# Patient Record
Sex: MALE | Race: WHITE | Employment: UNEMPLOYED | URBAN - METROPOLITAN AREA
[De-identification: names, ages, dates, MRNs, and addresses within clinical notes are randomized per-mention and may not be internally consistent; named-entity substitution may affect disease eponyms.]

---

## 2017-11-21 ENCOUNTER — HOSPITAL ENCOUNTER (INPATIENT)
Age: 82
LOS: 4 days | Discharge: NURSING, REHAB OR LONG-TERM CARE | DRG: 554 | End: 2017-11-28
Attending: EMERGENCY MEDICINE | Admitting: INTERNAL MEDICINE
Payer: MEDICARE

## 2017-11-21 ENCOUNTER — APPOINTMENT (OUTPATIENT)
Dept: GENERAL RADIOLOGY | Age: 82
DRG: 554 | End: 2017-11-21
Attending: EMERGENCY MEDICINE
Payer: MEDICARE

## 2017-11-21 ENCOUNTER — APPOINTMENT (OUTPATIENT)
Dept: ULTRASOUND IMAGING | Age: 82
DRG: 554 | End: 2017-11-21
Attending: EMERGENCY MEDICINE
Payer: MEDICARE

## 2017-11-21 DIAGNOSIS — M25.562 ACUTE PAIN OF LEFT KNEE: ICD-10-CM

## 2017-11-21 DIAGNOSIS — M25.462 KNEE EFFUSION, LEFT: Primary | ICD-10-CM

## 2017-11-21 DIAGNOSIS — F03.90 DEMENTIA WITHOUT BEHAVIORAL DISTURBANCE, UNSPECIFIED DEMENTIA TYPE: ICD-10-CM

## 2017-11-21 LAB
ALBUMIN SERPL-MCNC: 3.1 G/DL (ref 3.5–5)
ALBUMIN/GLOB SERPL: 0.7 {RATIO} (ref 1.1–2.2)
ALP SERPL-CCNC: 78 U/L (ref 45–117)
ALT SERPL-CCNC: 15 U/L (ref 12–78)
ANION GAP SERPL CALC-SCNC: 10 MMOL/L (ref 5–15)
AST SERPL-CCNC: 16 U/L (ref 15–37)
BASOPHILS # BLD: 0 K/UL (ref 0–0.1)
BASOPHILS NFR BLD: 0 % (ref 0–1)
BILIRUB SERPL-MCNC: 0.8 MG/DL (ref 0.2–1)
BUN SERPL-MCNC: 25 MG/DL (ref 6–20)
BUN/CREAT SERPL: 15 (ref 12–20)
CALCIUM SERPL-MCNC: 8.4 MG/DL (ref 8.5–10.1)
CHLORIDE SERPL-SCNC: 100 MMOL/L (ref 97–108)
CO2 SERPL-SCNC: 22 MMOL/L (ref 21–32)
CREAT SERPL-MCNC: 1.71 MG/DL (ref 0.7–1.3)
EOSINOPHIL # BLD: 0 K/UL (ref 0–0.4)
EOSINOPHIL NFR BLD: 0 % (ref 0–7)
ERYTHROCYTE [DISTWIDTH] IN BLOOD BY AUTOMATED COUNT: 13.4 % (ref 11.5–14.5)
GLOBULIN SER CALC-MCNC: 4.3 G/DL (ref 2–4)
GLUCOSE SERPL-MCNC: 107 MG/DL (ref 65–100)
HCT VFR BLD AUTO: 34.5 % (ref 36.6–50.3)
HGB BLD-MCNC: 12.2 G/DL (ref 12.1–17)
LYMPHOCYTES # BLD: 0.8 K/UL (ref 0.8–3.5)
LYMPHOCYTES NFR BLD: 7 % (ref 12–49)
MCH RBC QN AUTO: 33.8 PG (ref 26–34)
MCHC RBC AUTO-ENTMCNC: 35.4 G/DL (ref 30–36.5)
MCV RBC AUTO: 95.6 FL (ref 80–99)
MONOCYTES # BLD: 1.9 K/UL (ref 0–1)
MONOCYTES NFR BLD: 16 % (ref 5–13)
NEUTS SEG # BLD: 9.2 K/UL (ref 1.8–8)
NEUTS SEG NFR BLD: 77 % (ref 32–75)
PLATELET # BLD AUTO: 438 K/UL (ref 150–400)
POTASSIUM SERPL-SCNC: 3.8 MMOL/L (ref 3.5–5.1)
PROT SERPL-MCNC: 7.4 G/DL (ref 6.4–8.2)
RBC # BLD AUTO: 3.61 M/UL (ref 4.1–5.7)
SODIUM SERPL-SCNC: 132 MMOL/L (ref 136–145)
URATE SERPL-MCNC: 7.1 MG/DL (ref 3.5–7.2)
WBC # BLD AUTO: 12 K/UL (ref 4.1–11.1)

## 2017-11-21 PROCEDURE — 96372 THER/PROPH/DIAG INJ SC/IM: CPT

## 2017-11-21 PROCEDURE — 96374 THER/PROPH/DIAG INJ IV PUSH: CPT

## 2017-11-21 PROCEDURE — 80053 COMPREHEN METABOLIC PANEL: CPT | Performed by: EMERGENCY MEDICINE

## 2017-11-21 PROCEDURE — 87205 SMEAR GRAM STAIN: CPT | Performed by: EMERGENCY MEDICINE

## 2017-11-21 PROCEDURE — 93971 EXTREMITY STUDY: CPT

## 2017-11-21 PROCEDURE — 84550 ASSAY OF BLOOD/URIC ACID: CPT | Performed by: EMERGENCY MEDICINE

## 2017-11-21 PROCEDURE — 0S9D3ZX DRAINAGE OF LEFT KNEE JOINT, PERCUTANEOUS APPROACH, DIAGNOSTIC: ICD-10-PCS | Performed by: PHYSICIAN ASSISTANT

## 2017-11-21 PROCEDURE — 74011250637 HC RX REV CODE- 250/637: Performed by: EMERGENCY MEDICINE

## 2017-11-21 PROCEDURE — 3E0U33Z INTRODUCTION OF ANTI-INFLAMMATORY INTO JOINTS, PERCUTANEOUS APPROACH: ICD-10-PCS | Performed by: PHYSICIAN ASSISTANT

## 2017-11-21 PROCEDURE — 85025 COMPLETE CBC W/AUTO DIFF WBC: CPT | Performed by: EMERGENCY MEDICINE

## 2017-11-21 PROCEDURE — 99285 EMERGENCY DEPT VISIT HI MDM: CPT

## 2017-11-21 PROCEDURE — 74011000250 HC RX REV CODE- 250: Performed by: EMERGENCY MEDICINE

## 2017-11-21 PROCEDURE — 3E0U3BZ INTRODUCTION OF ANESTHETIC AGENT INTO JOINTS, PERCUTANEOUS APPROACH: ICD-10-PCS | Performed by: PHYSICIAN ASSISTANT

## 2017-11-21 PROCEDURE — A9270 NON-COVERED ITEM OR SERVICE: HCPCS | Performed by: EMERGENCY MEDICINE

## 2017-11-21 PROCEDURE — 74011636637 HC RX REV CODE- 636/637: Performed by: EMERGENCY MEDICINE

## 2017-11-21 PROCEDURE — 74011250636 HC RX REV CODE- 250/636: Performed by: INTERNAL MEDICINE

## 2017-11-21 PROCEDURE — 87077 CULTURE AEROBIC IDENTIFY: CPT | Performed by: EMERGENCY MEDICINE

## 2017-11-21 PROCEDURE — 36415 COLL VENOUS BLD VENIPUNCTURE: CPT | Performed by: EMERGENCY MEDICINE

## 2017-11-21 PROCEDURE — 75810000054 HC ARTHOCENTISIS JOINT

## 2017-11-21 PROCEDURE — 89060 EXAM SYNOVIAL FLUID CRYSTALS: CPT | Performed by: EMERGENCY MEDICINE

## 2017-11-21 PROCEDURE — 73562 X-RAY EXAM OF KNEE 3: CPT

## 2017-11-21 PROCEDURE — 87186 SC STD MICRODIL/AGAR DIL: CPT | Performed by: EMERGENCY MEDICINE

## 2017-11-21 PROCEDURE — 89050 BODY FLUID CELL COUNT: CPT | Performed by: EMERGENCY MEDICINE

## 2017-11-21 RX ORDER — HYDROCODONE BITARTRATE AND ACETAMINOPHEN 5; 325 MG/1; MG/1
1 TABLET ORAL
Status: COMPLETED | OUTPATIENT
Start: 2017-11-21 | End: 2017-11-21

## 2017-11-21 RX ORDER — PREDNISONE 20 MG/1
60 TABLET ORAL
Status: COMPLETED | OUTPATIENT
Start: 2017-11-21 | End: 2017-11-21

## 2017-11-21 RX ADMIN — LIDOCAINE HYDROCHLORIDE 45 MG: 10; .005 INJECTION, SOLUTION EPIDURAL; INFILTRATION; INTRACAUDAL; PERINEURAL at 19:48

## 2017-11-21 RX ADMIN — PREDNISONE 60 MG: 20 TABLET ORAL at 23:05

## 2017-11-21 RX ADMIN — METHYLPREDNISOLONE SODIUM SUCCINATE 125 MG: 125 INJECTION, POWDER, FOR SOLUTION INTRAMUSCULAR; INTRAVENOUS at 23:19

## 2017-11-21 RX ADMIN — HYDROCODONE BITARTRATE AND ACETAMINOPHEN 1 TABLET: 5; 325 TABLET ORAL at 19:38

## 2017-11-21 NOTE — IP AVS SNAPSHOT
[address] [facility] 
[phone] Patient: Luisana Moreirar. MRN: [id number] FUW:[id number] My Medications STOP taking these medications   
 colchicine 0.6 mg tablet TAKE these medications as instructed Instructions Each Dose to Equal  
 Morning Noon Evening Bedtime  
 allopurinol 100 mg tablet Commonly known as:  Martin Myers Start taking on:  11/29/2017 Your last dose was: Your next dose is: Take 1 Tab by mouth daily. 100 mg  
    
   
   
   
  
 amLODIPine 5 mg tablet Commonly known as:  Hema Lee Start taking on:  11/29/2017 Your last dose was: Your next dose is: Take 1 Tab by mouth daily. 5 mg  
    
   
   
   
  
 atropine-PHENobarbital-scopolamine-hyoscyamine 16.2-0.1037 -0.0194 mg per tablet Commonly known as:  DONNATAL Your last dose was: Your next dose is: Take 1 Tab by mouth every six (6) hours as needed for Pain. Max Daily Amount: 4 Tabs. 1 Tab CARDURA 4 mg tablet Generic drug:  doxazosin Your last dose was: Your next dose is: Take 4 mg by mouth nightly. Indications: HYPERTENSION  
 4 mg  
    
   
   
   
  
 famotidine 20 mg tablet Commonly known as:  PEPCID Your last dose was: Your next dose is: Take 1 Tab by mouth two (2) times a day. 20 mg HYDROcodone-acetaminophen 5-325 mg per tablet Commonly known as:  Lidia Sherwoodthergill Your last dose was: Your next dose is: Take 2 tablets by mouth every four (4) hours as needed. 2 Tab LASIX 20 mg tablet Generic drug:  furosemide Your last dose was: Your next dose is: Take 20 mg by mouth every other day. Indications: EDEMA 20 mg  
    
   
   
   
  
 memantine 10 mg tablet Commonly known as:  Gutierrez Anchors Start taking on:  12/10/2017 Your last dose was: Your next dose is: Take 1 Tab by mouth daily. 5 mg daily for 1 week, 5 mg p.o. twice daily for 1 week, 10 mg in a.m. and 5 mg at night for 1 week and then 10 mg p.o. twice daily 10 mg  
    
   
   
   
  
 metoprolol succinate 50 mg XL tablet Commonly known as:  TOPROL-XL Your last dose was: Your next dose is: Take 1 Tab by mouth daily. 50 mg  
    
   
   
   
  
 QUEtiapine 25 mg tablet Commonly known as:  SEROquel Your last dose was: Your next dose is: Take 0.5 Tabs by mouth nightly. 12.5 mg  
    
   
   
   
  
 tamsulosin 0.4 mg capsule Commonly known as:  FLOMAX Your last dose was: Your next dose is: Take 1 capsule by mouth daily. 0.4 mg Where to Get Your Medications Information on where to get these meds will be given to you by the nurse or doctor. ! Ask your nurse or doctor about these medications  
  allopurinol 100 mg tablet  
 amLODIPine 5 mg tablet  
 memantine 10 mg tablet QUEtiapine 25 mg tablet

## 2017-11-21 NOTE — IP AVS SNAPSHOT
[address] [facility] 
[phone] Patient: Nay Terrazas MRN: [id number]:[id number] About your hospitalization You were admitted on:  November 22, 2017 You last received care in the:  [facility] 2 GENERAL SURGERY You were discharged on:  November 28, 2017 Why you were hospitalized Your primary diagnosis was:  Not on File Your diagnoses also included:  Inability To Walk Things You Need To Do (next 8 weeks) Follow up with Radha Flores MD  
  
Phone:  [phone] Where:  [address], [address] [facility], [address] [facility] Discharge Orders None A check yahaira indicates which time of day the medication should be taken. My Medications STOP taking these medications   
 colchicine 0.6 mg tablet TAKE these medications as instructed Instructions Each Dose to Equal  
 Morning Noon Evening Bedtime  
 allopurinol 100 mg tablet Commonly known as:  Ashley Hampton Start taking on:  11/29/2017 Your last dose was: Your next dose is: Take 1 Tab by mouth daily. 100 mg  
    
   
   
   
  
 amLODIPine 5 mg tablet Commonly known as:  Bernard Presser Start taking on:  11/29/2017 Your last dose was: Your next dose is: Take 1 Tab by mouth daily. 5 mg  
    
   
   
   
  
 atropine-PHENobarbital-scopolamine-hyoscyamine 16.2-0.1037 -0.0194 mg per tablet Commonly known as:  DONNATAL Your last dose was: Your next dose is: Take 1 Tab by mouth every six (6) hours as needed for Pain. Max Daily Amount: 4 Tabs. 1 Tab CARDURA 4 mg tablet Generic drug:  doxazosin Your last dose was: Your next dose is: Take 4 mg by mouth nightly. Indications: HYPERTENSION  
 4 mg  
    
   
   
   
  
 famotidine 20 mg tablet Commonly known as:  PEPCID  
   
 Your last dose was: Your next dose is: Take 1 Tab by mouth two (2) times a day. 20 mg HYDROcodone-acetaminophen 5-325 mg per tablet Commonly known as:  Benetta Deisy Your last dose was: Your next dose is: Take 2 tablets by mouth every four (4) hours as needed. 2 Tab LASIX 20 mg tablet Generic drug:  furosemide Your last dose was: Your next dose is: Take 20 mg by mouth every other day. Indications: EDEMA 20 mg  
    
   
   
   
  
 memantine 10 mg tablet Commonly known as:  Samuel Thornton Start taking on:  12/10/2017 Your last dose was: Your next dose is: Take 1 Tab by mouth daily. 5 mg daily for 1 week, 5 mg p.o. twice daily for 1 week, 10 mg in a.m. and 5 mg at night for 1 week and then 10 mg p.o. twice daily 10 mg  
    
   
   
   
  
 metoprolol succinate 50 mg XL tablet Commonly known as:  TOPROL-XL Your last dose was: Your next dose is: Take 1 Tab by mouth daily. 50 mg  
    
   
   
   
  
 QUEtiapine 25 mg tablet Commonly known as:  SEROquel Your last dose was: Your next dose is: Take 0.5 Tabs by mouth nightly. 12.5 mg  
    
   
   
   
  
 tamsulosin 0.4 mg capsule Commonly known as:  FLOMAX Your last dose was: Your next dose is: Take 1 capsule by mouth daily. 0.4 mg Where to Get Your Medications Information on where to get these meds will be given to you by the nurse or doctor. ! Ask your nurse or doctor about these medications  
  allopurinol 100 mg tablet  
 amLODIPine 5 mg tablet  
 memantine 10 mg tablet QUEtiapine 25 mg tablet Discharge Instructions None [facility] Announcement  We are excited to announce that we are making your provider's discharge notes available to you in [employer]. You will see these notes when they are completed and signed by the physician that discharged you from your recent hospital stay. If you have any questions or concerns about any information you see in [employer], please call the Health Information Department where you were seen or reach out to your Primary Care Provider for more information about your plan of care. Introducing [facility]! [address] [facility] introduces [employer] patient portal. Now you can access parts of your medical record, email your doctor's office, and request medication refills online. 1. In your internet browser, go to [email or web address] 2. Click on the First Time User? Click Here link in the Sign In box. You will see the New Member Sign Up page. 3. Enter your [employer] Access Code exactly as it appears below. You will not need to use this code after youve completed the sign-up process. If you do not sign up before the expiration date, you must request a new code. · [employer] Access Code: [email or web address] Expires: 2/26/2018  9:28 AM 
 
4. Enter the last four digits of your Social Security Number (xxxx) and Date of Birth (mm/dd/yyyy) as indicated and click Submit. You will be taken to the next sign-up page. 5. Create a [employer] ID. This will be your [employer] login ID and cannot be changed, so think of one that is secure and easy to remember. 6. Create a [employer] password. You can change your password at any time. 7. Enter your Password Reset Question and Answer. This can be used at a later time if you forget your password. 8. Enter your e-mail address. You will receive e-mail notification when new information is available in [address] 9. Click Sign Up. You can now view and download portions of your medical record. 10. Click the Download Summary menu link to download a portable copy of your medical information. If you have questions, please visit the Frequently Asked Questions section of the MyChart website. Remember, [employer] is NOT to be used for urgent needs. For medical emergencies, dial 911. Now available from your iPhone and Android! Unresulted Labs-Please follow up with your PCP about these lab tests Order Current Status CULTURE, BODY FLUID W GRAM STAIN Preliminary result Providers Seen During Your Hospitalization Provider Specialty Primary office phone Julietteanni Castillothom. Tammie Mahoney MD Emergency Medicine [phone] Robel Walton MD Hospitalist [phone] Chari Cooper MD Hospitalist [phone] Cecilia Beverly MD Internal Medicine [phone] Immunizations Administered for This Admission Name Date Influenza Vaccine (Quad) PF 11/23/2017 Your Primary Care Physician (PCP) Primary Care Physician Office Phone Office Fax Shanelle Guillen [phone] You are allergic to the following No active allergies Recent Documentation Height Weight BMI Smoking Status 1.829 m 81.6 kg 24.41 kg/m2 Former Smoker Emergency Contacts Name Discharge Info Relation Home Work Mobile Ambar Dillon N/A  AT THIS TIME [6] Friend [5] [phone] Patient Belongings The following personal items are in your possession at time of discharge: 
  Dental Appliances: None  Visual Aid: Glasses      Home Medications: None   Jewelry: None  Clothing: At bedside    Other Valuables: None Please provide this summary of care documentation to your next provider. Signatures-by signing, you are acknowledging that this After Visit Summary has been reviewed with you and you have received a copy. Patient Signature:  ____________________________________________________________ Date:  ____________________________________________________________  
  
Florencia Kincaid  Provider Signature: ____________________________________________________________ Date:  ____________________________________________________________

## 2017-11-22 PROBLEM — R26.2 INABILITY TO WALK: Status: ACTIVE | Noted: 2017-11-22

## 2017-11-22 LAB
ANION GAP SERPL CALC-SCNC: 12 MMOL/L (ref 5–15)
APPEARANCE FLD: ABNORMAL
BODY FLD TYPE: NORMAL
BUN SERPL-MCNC: 25 MG/DL (ref 6–20)
BUN/CREAT SERPL: 17 (ref 12–20)
CALCIUM SERPL-MCNC: 8 MG/DL (ref 8.5–10.1)
CHLORIDE SERPL-SCNC: 103 MMOL/L (ref 97–108)
CO2 SERPL-SCNC: 18 MMOL/L (ref 21–32)
COLOR FLD: YELLOW
CREAT SERPL-MCNC: 1.44 MG/DL (ref 0.7–1.3)
CRYSTALS FLD MICRO: NORMAL
GLUCOSE SERPL-MCNC: 127 MG/DL (ref 65–100)
MONOS+MACROS NFR FLD: 10 %
NEUTROPHILS NFR FLD: 90 %
NUC CELL # FLD: ABNORMAL /CU MM (ref 0–5)
POTASSIUM SERPL-SCNC: 3.8 MMOL/L (ref 3.5–5.1)
RBC # FLD: >100 /CU MM
SODIUM SERPL-SCNC: 133 MMOL/L (ref 136–145)
SPECIMEN SOURCE FLD: ABNORMAL

## 2017-11-22 PROCEDURE — 74011000250 HC RX REV CODE- 250: Performed by: PHYSICIAN ASSISTANT

## 2017-11-22 PROCEDURE — 74011250636 HC RX REV CODE- 250/636: Performed by: INTERNAL MEDICINE

## 2017-11-22 PROCEDURE — 36415 COLL VENOUS BLD VENIPUNCTURE: CPT | Performed by: INTERNAL MEDICINE

## 2017-11-22 PROCEDURE — 97161 PT EVAL LOW COMPLEX 20 MIN: CPT | Performed by: PHYSICAL THERAPIST

## 2017-11-22 PROCEDURE — 97116 GAIT TRAINING THERAPY: CPT | Performed by: PHYSICAL THERAPIST

## 2017-11-22 PROCEDURE — 74011250636 HC RX REV CODE- 250/636: Performed by: PHYSICIAN ASSISTANT

## 2017-11-22 PROCEDURE — 74011250637 HC RX REV CODE- 250/637: Performed by: INTERNAL MEDICINE

## 2017-11-22 PROCEDURE — G8979 MOBILITY GOAL STATUS: HCPCS | Performed by: PHYSICAL THERAPIST

## 2017-11-22 PROCEDURE — G8978 MOBILITY CURRENT STATUS: HCPCS | Performed by: PHYSICAL THERAPIST

## 2017-11-22 PROCEDURE — 99218 HC RM OBSERVATION: CPT

## 2017-11-22 PROCEDURE — 80048 BASIC METABOLIC PNL TOTAL CA: CPT | Performed by: INTERNAL MEDICINE

## 2017-11-22 RX ORDER — METOPROLOL SUCCINATE 50 MG/1
50 TABLET, EXTENDED RELEASE ORAL DAILY
Status: DISCONTINUED | OUTPATIENT
Start: 2017-11-22 | End: 2017-11-28 | Stop reason: HOSPADM

## 2017-11-22 RX ORDER — SODIUM CHLORIDE 0.9 % (FLUSH) 0.9 %
5-10 SYRINGE (ML) INJECTION EVERY 8 HOURS
Status: DISCONTINUED | OUTPATIENT
Start: 2017-11-22 | End: 2017-11-23

## 2017-11-22 RX ORDER — HEPARIN SODIUM 5000 [USP'U]/ML
5000 INJECTION, SOLUTION INTRAVENOUS; SUBCUTANEOUS EVERY 12 HOURS
Status: DISCONTINUED | OUTPATIENT
Start: 2017-11-22 | End: 2017-11-28 | Stop reason: HOSPADM

## 2017-11-22 RX ORDER — SODIUM CHLORIDE 9 MG/ML
75 INJECTION, SOLUTION INTRAVENOUS CONTINUOUS
Status: DISCONTINUED | OUTPATIENT
Start: 2017-11-22 | End: 2017-11-22

## 2017-11-22 RX ORDER — SODIUM CHLORIDE 0.9 % (FLUSH) 0.9 %
5-10 SYRINGE (ML) INJECTION AS NEEDED
Status: DISCONTINUED | OUTPATIENT
Start: 2017-11-22 | End: 2017-11-23

## 2017-11-22 RX ORDER — DOXAZOSIN 2 MG/1
4 TABLET ORAL
Status: DISCONTINUED | OUTPATIENT
Start: 2017-11-22 | End: 2017-11-28 | Stop reason: HOSPADM

## 2017-11-22 RX ORDER — AMLODIPINE BESYLATE 5 MG/1
5 TABLET ORAL DAILY
Status: DISCONTINUED | OUTPATIENT
Start: 2017-11-22 | End: 2017-11-28 | Stop reason: HOSPADM

## 2017-11-22 RX ORDER — LIDOCAINE HYDROCHLORIDE 10 MG/ML
10 INJECTION INFILTRATION; PERINEURAL ONCE
Status: COMPLETED | OUTPATIENT
Start: 2017-11-22 | End: 2017-11-22

## 2017-11-22 RX ORDER — HALOPERIDOL 5 MG/ML
1 INJECTION INTRAMUSCULAR ONCE
Status: COMPLETED | OUTPATIENT
Start: 2017-11-22 | End: 2017-11-22

## 2017-11-22 RX ORDER — TAMSULOSIN HYDROCHLORIDE 0.4 MG/1
0.4 CAPSULE ORAL DAILY
Status: DISCONTINUED | OUTPATIENT
Start: 2017-11-22 | End: 2017-11-28 | Stop reason: HOSPADM

## 2017-11-22 RX ORDER — FAMOTIDINE 20 MG/1
20 TABLET, FILM COATED ORAL 2 TIMES DAILY
Status: DISCONTINUED | OUTPATIENT
Start: 2017-11-22 | End: 2017-11-24

## 2017-11-22 RX ORDER — ONDANSETRON 2 MG/ML
4 INJECTION INTRAMUSCULAR; INTRAVENOUS
Status: DISCONTINUED | OUTPATIENT
Start: 2017-11-22 | End: 2017-11-28 | Stop reason: HOSPADM

## 2017-11-22 RX ORDER — ACETAMINOPHEN 325 MG/1
650 TABLET ORAL
Status: DISCONTINUED | OUTPATIENT
Start: 2017-11-22 | End: 2017-11-28 | Stop reason: HOSPADM

## 2017-11-22 RX ORDER — METHYLPREDNISOLONE ACETATE 40 MG/ML
40 INJECTION, SUSPENSION INTRA-ARTICULAR; INTRALESIONAL; INTRAMUSCULAR; SOFT TISSUE ONCE
Status: COMPLETED | OUTPATIENT
Start: 2017-11-22 | End: 2017-11-22

## 2017-11-22 RX ORDER — FUROSEMIDE 20 MG/1
20 TABLET ORAL EVERY OTHER DAY
Status: DISCONTINUED | OUTPATIENT
Start: 2017-11-22 | End: 2017-11-28 | Stop reason: HOSPADM

## 2017-11-22 RX ADMIN — AMLODIPINE BESYLATE 5 MG: 5 TABLET ORAL at 12:54

## 2017-11-22 RX ADMIN — DOXAZOSIN 4 MG: 2 TABLET ORAL at 20:45

## 2017-11-22 RX ADMIN — METOPROLOL SUCCINATE 50 MG: 50 TABLET, EXTENDED RELEASE ORAL at 09:42

## 2017-11-22 RX ADMIN — HALOPERIDOL LACTATE 1 MG: 5 INJECTION, SOLUTION INTRAMUSCULAR at 20:46

## 2017-11-22 RX ADMIN — SODIUM CHLORIDE 75 ML/HR: 900 INJECTION, SOLUTION INTRAVENOUS at 05:31

## 2017-11-22 RX ADMIN — METHYLPREDNISOLONE ACETATE 40 MG: 40 INJECTION, SUSPENSION INTRA-ARTICULAR; INTRALESIONAL; INTRAMUSCULAR; SOFT TISSUE at 00:48

## 2017-11-22 RX ADMIN — HEPARIN SODIUM 5000 UNITS: 5000 INJECTION, SOLUTION INTRAVENOUS; SUBCUTANEOUS at 04:36

## 2017-11-22 RX ADMIN — Medication 10 ML: at 05:38

## 2017-11-22 RX ADMIN — TAMSULOSIN HYDROCHLORIDE 0.4 MG: 0.4 CAPSULE ORAL at 09:41

## 2017-11-22 RX ADMIN — FAMOTIDINE 20 MG: 20 TABLET ORAL at 18:00

## 2017-11-22 RX ADMIN — HEPARIN SODIUM 5000 UNITS: 5000 INJECTION, SOLUTION INTRAVENOUS; SUBCUTANEOUS at 15:37

## 2017-11-22 RX ADMIN — FUROSEMIDE 20 MG: 20 TABLET ORAL at 04:12

## 2017-11-22 RX ADMIN — FAMOTIDINE 20 MG: 20 TABLET ORAL at 09:41

## 2017-11-22 RX ADMIN — Medication 10 ML: at 14:00

## 2017-11-22 RX ADMIN — LIDOCAINE HYDROCHLORIDE 10 ML: 10 INJECTION, SOLUTION INFILTRATION; PERINEURAL at 00:48

## 2017-11-22 NOTE — PROGRESS NOTES
Bedside shift change report given to Agusto Bob RN (oncoming nurse) by Bert Nageotte, RN (offgoing nurse). Report included the following information SBAR.

## 2017-11-22 NOTE — ED NOTES
Bedside shift change report given to Shannan Parekh RN (oncoming nurse) by Khushbu Phelps RN (offgoing nurse). Report included the following information SBAR, Kardex, ED Summary, Intake/Output, MAR and Recent Results.

## 2017-11-22 NOTE — ED NOTES
Bedside and Verbal shift change report given to Eyvonne Nissen, RN (oncoming nurse) by this RN (offgoing nurse). Report included the following information SBAR.

## 2017-11-22 NOTE — PROGRESS NOTES
Primary Nurse Marck Camara RN and Jamse Kehr , RN performed a dual skin assessment on this patient. Blanchable redness noted on inner left buttock without opening.  Multiple tattoos noted on all extremities  Manny score is 18

## 2017-11-22 NOTE — ED NOTES
Pt unable to stand up fully and ambulate for road test.  Pt has moderate pain in left knee.   MD updated

## 2017-11-22 NOTE — ED NOTES
Pt unable to tolerate ambulating at this time. Pt's LLE is very stiff and unable to bend. Pt tries to stand up straight, but has moderate pain in LLE.

## 2017-11-22 NOTE — CONSULTS
ORTHOPAEDIC CONSULT NOTE    Subjective:     Date of Consultation:  November 22, 2017      Luisana Devi is a 80 y.o. male who is being seen for left knee pain and swelling. Pt reports 3+ week HX of worsening left knee symptoms. C/O stabbing pain superio/lateral knee with wt bearing/ambulation. States he has had knee problems in the past but never this bad. Denied injury or trauma. States he has NOT seen orthopaedics in the past.  Ambulates at baseline unassisted. Lives alone, usually very active- bowling. Denies fever/chills, or flu like symptoms. Admits to short term memory difficulty     Had knee aspirated by ER provider prior to ortho consult.  \"120 cc's of straw colored/turbid fluid was withdrawn and sent for aerobic culture, cell count & diff, crystal analysis and gram stain \"       Patient Active Problem List    Diagnosis Date Noted    Coronary artery disease involving native coronary artery without angina pectoris 05/13/2016    S/P CABG (coronary artery bypass graft) 05/13/2016    Hiatal hernia with gastroesophageal reflux 05/13/2016    H/O endovascular stent graft for abdominal aortic aneurysm 05/13/2016    CKD (chronic kidney disease) stage 3, GFR 30-59 ml/min 05/13/2016    AAA (abdominal aortic aneurysm) ([facility] [address] 11/07/2014     Family History   Problem Relation Age of Onset    Cancer Mother      liver    Hypertension Father     No Known Problems Sister     No Known Problems Brother     No Known Problems Sister      brain aneurysym      Social History   Substance Use Topics    Smoking status: Former Smoker    Smokeless tobacco: Never Used    Alcohol use No     Past Medical History:   Diagnosis Date    Abdominal aortic aneurysm (HCC)     Arthritis     CAD (coronary artery disease)     bypass    Hypertension     Other ill-defined conditions(799.89)     gout    Other ill-defined conditions(799.89)     high cholesterol      Past Surgical History:   Procedure Laterality Date    ABDOMEN SURGERY PROC UNLISTED      hernia    CARDIAC SURG PROCEDURE UNLIST      bypass surgery    HX CATARACT REMOVAL      HX TONSILLECTOMY        Prior to Admission medications    Medication Sig Start Date End Date Taking? Authorizing Provider   famotidine (PEPCID) 20 mg tablet Take 1 Tab by mouth two (2) times a day. 5/13/16   Fabiola Henry MD   atropine-PHENobarbital-scopolamine-hyoscyamine [facility]) 16.2-0.1037 -0.0194 mg per tablet Take 1 Tab by mouth every six (6) hours as needed for Pain. Max Daily Amount: 4 Tabs. 5/13/16   Fabiola Henry MD   HYDROcodone-acetaminophen (NORCO) 5-325 mg per tablet Take 2 tablets by mouth every four (4) hours as needed. 11/9/14   Renee Saucedo MD   tamsulosin (FLOMAX) 0.4 mg capsule Take 1 capsule by mouth daily. 11/9/14   Renee Saucedo MD   furosemide (LASIX) 20 mg tablet Take 20 mg by mouth every other day. Indications: EDEMA    Historical Provider   colchicine 0.6 mg tablet Take 0.6 mg by mouth daily as needed. Indications: GOUT    Historical Provider   doxazosin (CARDURA) 4 mg tablet Take 4 mg by mouth nightly. Indications: HYPERTENSION    Historical Provider   metoprolol-XL (TOPROL-XL) 50 mg XL tablet Take 1 Tab by mouth daily. 5/22/13   Larose Essex, PA     No current facility-administered medications for this encounter. Current Outpatient Prescriptions   Medication Sig    famotidine (PEPCID) 20 mg tablet Take 1 Tab by mouth two (2) times a day.  atropine-PHENobarbital-scopolamine-hyoscyamine (DONNATAL) 16.2-0.1037 -0.0194 mg per tablet Take 1 Tab by mouth every six (6) hours as needed for Pain. Max Daily Amount: 4 Tabs.  HYDROcodone-acetaminophen (NORCO) 5-325 mg per tablet Take 2 tablets by mouth every four (4) hours as needed.  tamsulosin (FLOMAX) 0.4 mg capsule Take 1 capsule by mouth daily.  furosemide (LASIX) 20 mg tablet Take 20 mg by mouth every other day.  Indications: EDEMA    colchicine 0.6 mg tablet Take 0.6 mg by mouth daily as needed. Indications: GOUT    doxazosin (CARDURA) 4 mg tablet Take 4 mg by mouth nightly. Indications: HYPERTENSION    metoprolol-XL (TOPROL-XL) 50 mg XL tablet Take 1 Tab by mouth daily. No Known Allergies     Review of Systems:  A comprehensive review of systems was negative except for that written in the HPI. Mental Status: no dementia    Objective:     Patient Vitals for the past 8 hrs:   BP Temp Pulse Resp SpO2 Height Weight   17 0030 121/72 - 69 20 98 % - -   17 0015 137/71 - 66 19 98 % - -   17 0002 115/74 - - 16 99 % - -   17 0001 - 98.4 °F (36.9 °C) 66 - 100 % - -   17 2358 (!) 134/93 - 77 22 - - -   17 2315 148/75 - - - 95 % - -   17 2310 147/87 98.4 °F (36.9 °C) 82 16 95 % - -   17 2240 142/89 98.2 °F (36.8 °C) 79 17 100 % - -   17 2109 144/86 97.4 °F (36.3 °C) 77 17 99 % - -   17 154/88 97.8 °F (36.6 °C) 74 15 100 % - -   17 1835 (!) 161/95 97.6 °F (36.4 °C) 75 16 100 % 6' (1.829 m) 81.6 kg (180 lb)     Temp (24hrs), Av °F (36.7 °C), Min:97.4 °F (36.3 °C), Max:98.4 °F (36.9 °C)      Gen: Well-developed,  in no acute distress   HEENT: Pink conjunctivae, hearing intact to voice, moist mucous membranes   Neck: Supple  Resp: No respiratory distress   Card: RRR, palpable distal pulse-equal bilaterally, birsk cap refill all distal digits   Musc: left knee with 1+ effusion, NO erythema, streaking or lymphadenopathy. Full ext, flexion limited by pain- 75 degrees. Intact SLR/extensor mechanism. No instability w/ susan/varus stress. Neurovascular exam intact LE bilat. Skin: No skin breakdown noted. Skin warm, pink, dry  Neuro: Cranial nerves are grossly intact, no focal motor weakness, follows commands appropriately   Psych: Good insight, oriented to person, place and time, alert    Imaging Review: XR KNEE LT 3 V   INDICATION: Nontraumatic left knee pain and swelling over last several days.    COMPARISON: None.   FINDINGS: Four views of the left knee demonstrate moderate diffuse osteopenia  without evidence for acute fracture or dislocation. Mild patellofemoral  osteoarthrosis is shown with patellar marginal osteophytes including a small   osteophyte proximally. There is a large knee effusion is  demonstrated. A 5 mm subcortical cyst is shown at the proximal margin of the  patella.     IMPRESSION:  Osteopenia. Large knee effusion    Labs:   Recent Results (from the past 24 hour(s))   CBC WITH AUTOMATED DIFF    Collection Time: 11/21/17  7:46 PM   Result Value Ref Range    WBC 12.0 (H) 4.1 - 11.1 K/uL    RBC 3.61 (L) 4.10 - 5.70 M/uL    HGB 12.2 12.1 - 17.0 g/dL    HCT 34.5 (L) 36.6 - 50.3 %    MCV 95.6 80.0 - 99.0 FL    MCH 33.8 26.0 - 34.0 PG    MCHC 35.4 30.0 - 36.5 g/dL    RDW 13.4 11.5 - 14.5 %    PLATELET 315 (H) 985 - 400 K/uL    NEUTROPHILS 77 (H) 32 - 75 %    LYMPHOCYTES 7 (L) 12 - 49 %    MONOCYTES 16 (H) 5 - 13 %    EOSINOPHILS 0 0 - 7 %    BASOPHILS 0 0 - 1 %    ABS. NEUTROPHILS 9.2 (H) 1.8 - 8.0 K/UL    ABS. LYMPHOCYTES 0.8 0.8 - 3.5 K/UL    ABS. MONOCYTES 1.9 (H) 0.0 - 1.0 K/UL    ABS. EOSINOPHILS 0.0 0.0 - 0.4 K/UL    ABS. BASOPHILS 0.0 0.0 - 0.1 K/UL   METABOLIC PANEL, COMPREHENSIVE    Collection Time: 11/21/17  8:23 PM   Result Value Ref Range    Sodium 132 (L) 136 - 145 mmol/L    Potassium 3.8 3.5 - 5.1 mmol/L    Chloride 100 97 - 108 mmol/L    CO2 22 21 - 32 mmol/L    Anion gap 10 5 - 15 mmol/L    Glucose 107 (H) 65 - 100 mg/dL    BUN 25 (H) 6 - 20 MG/DL    Creatinine 1.71 (H) 0.70 - 1.30 MG/DL    BUN/Creatinine ratio 15 12 - 20      GFR est AA 46 (L) >60 ml/min/1.73m2    GFR est non-AA 38 (L) >60 ml/min/1.73m2    Calcium 8.4 (L) 8.5 - 10.1 MG/DL    Bilirubin, total 0.8 0.2 - 1.0 MG/DL    ALT (SGPT) 15 12 - 78 U/L    AST (SGOT) 16 15 - 37 U/L    Alk.  phosphatase 78 45 - 117 U/L    Protein, total 7.4 6.4 - 8.2 g/dL    Albumin 3.1 (L) 3.5 - 5.0 g/dL    Globulin 4.3 (H) 2.0 - 4.0 g/dL    A-G Ratio 0.7 (L) 1.1 - 2. 2     URIC ACID    Collection Time: 11/21/17  8:23 PM   Result Value Ref Range    Uric acid 7.1 3.5 - 7.2 MG/DL         Impression:     Patient Active Problem List    Diagnosis Date Noted    Coronary artery disease involving native coronary artery without angina pectoris 05/13/2016    S/P CABG (coronary artery bypass graft) 05/13/2016    Hiatal hernia with gastroesophageal reflux 05/13/2016    H/O endovascular stent graft for abdominal aortic aneurysm 05/13/2016    CKD (chronic kidney disease) stage 3, GFR 30-59 ml/min 05/13/2016    AAA (abdominal aortic aneurysm) ([facility] [address] 11/07/2014     Active Problems:    * No active hospital problems. *      Plan:     Large knee effusion- No indication of septic joint  Prior left knee aspirate studies pending    Likely gouty arthropathy    Left knee joint aspiration and injection; procedure described to patient- risk and benefits reviewed. Consent signed by patient, witness by RN. Under sterile condition 3cc lidocaine injected into sub-q tissues right knee, via superior lateral patellar approach  Joint aspirated with 18g on a 60cc syringe. 60cc fluid removed turbid straw colored. Depo-mederol and 1%lidocaine (40:6) injected into left knee. Band-aid applied  There were no complication pt tolerated it well  Vitals stable before and after procedure    Christophe James RN present and assisted throughout procedure    I reviewed with pt potential complication,S/S, indications to return to the ER/911      F/U with PCP or orthopedic as needed     Dr. Stevie Tirnh aware and agrees with plan as above.         Yulia Oakes PA-C  [address] [facility]

## 2017-11-22 NOTE — PROGRESS NOTES
Interdisciplinary Rounds were completed on this patient. Rounds included nursing, clinical care leader, pharmacy, and case management. Patient was ambulating in room with nursing. Goals for the day will include: mobilize. Case management to follow up with patient's family regarding discharge planning (patient not appropriate to discharge back to home alone).

## 2017-11-22 NOTE — ED NOTES
TRANSFER - OUT REPORT:    Verbal report given to Ruth Huang RN(name) on Tamara Hazard.  being transferred to gen surg(unit) for routine progression of care       Report consisted of patients Situation, Background, Assessment and   Recommendations(SBAR). Information from the following report(s) SBAR, Kardex, ED Summary, Procedure Summary, Intake/Output, MAR and Recent Results was reviewed with the receiving nurse. Lines:   Peripheral IV 11/21/17 Right Wrist (Active)   Site Assessment Clean, dry, & intact 11/21/2017  9:24 PM   Phlebitis Assessment 0 11/21/2017  9:24 PM   Infiltration Assessment 0 11/21/2017  9:24 PM   Dressing Status Clean, dry, & intact 11/21/2017  9:24 PM   Dressing Type 4 X 4 11/21/2017  9:24 PM   Hub Color/Line Status Blue 11/21/2017  9:24 PM        Opportunity for questions and clarification was provided.       [facility]

## 2017-11-22 NOTE — CONSULTS
Hospitalist Consult Note    NAME: Tamara Chen. :  3/15/1933   MRN:  [id number]     Date/Time:  2017 10:52 PM    Patient PCP: Enrico Carvajal MD    I have been asked to see this patient by the attending, Dr. Bereket Oden , for advice/opinion re: gouty knee. 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 -orthopedics to come and see patient now to attempt use of depomedrol/lidocaine in the hopes of allowing increased mobility so that he can get into his home tonight. If not may need to convert to Observation status until his mobility and strength improves  -avoid use of colchicine in this patient given his renal dysfunction. I have discussed with the patietn and removed the medicine from his list of medicine to use. His PCP will need to discuss other options vs consultation with Nephrology as to next steps. Chronic kidney disease, stage 3b, unclear albuminuria baseline  hyponatremia  -continue OP open discussions with PCP and Nephrology as you are doing  -dc colchicine  -continue htn regimen  -frequent labs to monitor baseline functionality with PCP outpatient  -adjust all medications for renal dysfunction    Hypertension, benign essential  Coronary artery disease  hypercholesterolemia  -remains on toprol and lasix  -PCP to continue discussions with regard to statin use  -careful use of medicine with attention to his CKD  -continues on lasix for now - suggest continued frequent lab monitoring over this time    **safe for dc to home at this time as per orthopedic recs and ED recs.  Please call back if there are further questions or unable to ambulate       Subjective:   CHIEF COMPLAINT:  \"i could no longer walk on it\"    HISTORY OF PRESENT ILLNESS:     Gloria Sarkar is a 80 y.o.  male whom presents with complaint noted above. Patient notes hx of gout with flares presents with left knee pain and swelling over last 2.5days. He notes no fevers, no redness/streaking. No trauma. Notes no falls on the knee but decreased mobility. Unable to bear weight today and came to ED. Notes not taking colchicine as is in his medication list. Not recently on steroids. Did not call his PCP prior to arrival in ED. He has never had fluid removed from a joint he could recall. Notes that he has had gout \"practically in all my joints, but seems worse in this left ankle and knee\". ED showed no DVT. S/p left knee arthrocentesis and 120cc straw fluid removed, patietn still with pain. Pending is cell count and culture and crystal evaluation. We were asked to discuss fruther given his inability to ambulate on the knee. Past Medical History:   Diagnosis Date    Abdominal aortic aneurysm (HCC)     Arthritis     CAD (coronary artery disease)     bypass    Hypertension     Other ill-defined conditions(799.89)     gout    Other ill-defined conditions(799.89)     high cholesterol      Past Surgical History:   Procedure Laterality Date    ABDOMEN SURGERY PROC UNLISTED      hernia    CARDIAC SURG PROCEDURE UNLIST      bypass surgery    HX CATARACT REMOVAL      HX TONSILLECTOMY       Social History   Substance Use Topics    Smoking status: Former Smoker    Smokeless tobacco: Never Used    Alcohol use No      Family History   Problem Relation Age of Onset    Cancer Mother      liver    Hypertension Father     No Known Problems Sister     No Known Problems Brother     No Known Problems Sister      brain aneurysym      No Known Allergies     Prior to Admission medications    Medication Sig Start Date End Date Taking? Authorizing Provider   famotidine (PEPCID) 20 mg tablet Take 1 Tab by mouth two (2) times a day.  5/13/16   Doe Mckeon MD atropine-PHENobarbital-scopolamine-hyoscyamine (DONNATAL) 16.2-0.1037 -0.0194 mg per tablet Take 1 Tab by mouth every six (6) hours as needed for Pain. Max Daily Amount: 4 Tabs. 5/13/16   Mamie Aleman MD   HYDROcodone-acetaminophen (NORCO) 5-325 mg per tablet Take 2 tablets by mouth every four (4) hours as needed. 11/9/14   Juan Manuel Fabian MD   tamsulosin (FLOMAX) 0.4 mg capsule Take 1 capsule by mouth daily. 11/9/14   Juan Manuel Fabian MD   furosemide (LASIX) 20 mg tablet Take 20 mg by mouth every other day. Indications: EDEMA    Historical Provider   colchicine 0.6 mg tablet Take 0.6 mg by mouth daily as needed. Indications: GOUT    Historical Provider   doxazosin (CARDURA) 4 mg tablet Take 4 mg by mouth nightly. Indications: HYPERTENSION    Historical Provider   metoprolol-XL (TOPROL-XL) 50 mg XL tablet Take 1 Tab by mouth daily. 5/22/13   SANDEEP Boothe     No current facility-administered medications for this encounter. Current Outpatient Prescriptions   Medication Sig    famotidine (PEPCID) 20 mg tablet Take 1 Tab by mouth two (2) times a day.  atropine-PHENobarbital-scopolamine-hyoscyamine (DONNATAL) 16.2-0.1037 -0.0194 mg per tablet Take 1 Tab by mouth every six (6) hours as needed for Pain. Max Daily Amount: 4 Tabs.  HYDROcodone-acetaminophen (NORCO) 5-325 mg per tablet Take 2 tablets by mouth every four (4) hours as needed.  tamsulosin (FLOMAX) 0.4 mg capsule Take 1 capsule by mouth daily.  furosemide (LASIX) 20 mg tablet Take 20 mg by mouth every other day. Indications: EDEMA    colchicine 0.6 mg tablet Take 0.6 mg by mouth daily as needed. Indications: GOUT    doxazosin (CARDURA) 4 mg tablet Take 4 mg by mouth nightly. Indications: HYPERTENSION    metoprolol-XL (TOPROL-XL) 50 mg XL tablet Take 1 Tab by mouth daily.       REVIEW OF SYSTEMS:    General: negative for fever, chills, sweats, weakness, weight loss  Eyes: negative for blurred vision, eye pain, loss of vision, diplopia  Ear Nose and Throat: negative for rhinorrhea, pharyngitis, otalgia, tinnitus, speech or swallowing difficulties  Respiratory:  negative for cough, sputum production, SOB, wheezing, URENA, pleuritic pain  Cardiology:  negative for chest pain, palpitations, orthopnea, PND, edema, syncope   Gastrointestinal: negative for abdominal pain, N/V, dysphagia, change in bowel habits, bleeding  Genitourinary: negative for frequency, urgency, dysuria, hematuria, incontinence  Muskuloskeletal : +left knee arthralgia and decreased rom. +edema left ankle and knee.  No myalgia  Hematology: negative for easy bruising, bleeding, lymphadenopathy  Dermatological: negative for rash, ulceration, mole change, new lesion  Endocrine: negative for hot flashes or polydipsia  Neurological: negative for headache, dizziness, confusion, focal weakness, paresthesia, memory loss, gait disturbance  Psychological: negative for anxiety, depression, agitation    Objective:   VITALS:    Visit Vitals    /89    Pulse 79    Temp 98.2 °F (36.8 °C)    Resp 17    Ht 6' (1.829 m)    Wt 81.6 kg (180 lb)    SpO2 100%    BMI 24.41 kg/m2     PHYSICAL EXAM:     GENERAL:    WD y   WN y   Cachectic    Thin y   Obese    Disheveled n   Ill Appearing Critically n   Ill Appearing Chronically y   Acute Distress n   Other      HEENT:    NC/AT/EOMI y   PERRLA y   Conjunctivae Pink    Conjunctivae Pale y   Moist Mucosa y   Dry Mucosa    Hearing intact to voice y   Other      NECK:    Supple y   Masses n   Thyroid Tender n   Other                   RESPIRATORY:    CTA bilaterally WITHOUT wheezing/rhonchi/rales or crackles y   Wheezing    Rhonchi    Crackles    Use of accessory muscles n   Other      CARDIAC:    regular rate and rhythm No murmurs/rubs/gallops y   Murmur    Rubs    Gallops n   Rate Regular/Irregular reg   Carotid Bruit Left/Right n   Lower Extremity Edema n   JVP  n   Other      ABDOMEN:    soft/non distended non tender +bowel sounds no HSM y   Rigid    Tenderness    Hepatomegaly    Splenomegaly    Distended n   Normal/Hyper/Hypo Active Bowel Sounds nml   Other      SKIN / MUSCULOSKELETAL:    Rashes n   Ecchymosis n   Ulcers n   Tight to palpitation    Turgor Good/Poor    Cyanosis/Clubbing n   Amputation(s)    Other y - left ankle edema 2+, left knee s/p arthrocentesis. Decreased ROM without pain. Noted bilateral DIP chronic gouty tofi     NEUROLOGY:    cranial nerves II-XII grossly intact y   Cranial Nerve Deficit    Facial Droop    Slurred Speech n   Aphasia    Strength Normal y   Weakness y left leg   Meningismus/Kernig's Sign/ Brudzinsky n   Follows Commands y   Other      PSYCHIATRIC:    AAOx3 in no acute distress y   Insight Poor    Insight Good y   Alert and Oriented to Person     Alert and Oriented to Place    Alert and Oriented toTime    Depressed    Anxious n   Agitated n   Lethargic n   Stuporous n   Sedated    Other      ________________________________________________________________________  Care Plan discussed with:    Comments   Patient y Discussed gout, CKD with patietn. Discussed worry with use of colchicine and this medicine. Questions answered 21   Family      RN y    Care Manager                    Consultant:  radha jaramillo Ed 15   ________________________________________________________________________  TOTAL TIME:  45    Comments   >50% of visit spent in counseling and coordination of care x See above     Critical Care Provided     Minutes non procedure based  ________________________________________________________________________  Signed: Isamar Pineda MD    This note will not be viewable in [address] Procedures: see electronic medical records for all procedures/Xrays and details which were not copied into this note but were reviewed prior to creation of Plan.     LAB DATA REVIEWED:    Recent Results (from the past 24 hour(s))   CBC WITH AUTOMATED DIFF    Collection Time: 11/21/17  7:46 PM   Result Value Ref Range    WBC 12.0 (H) 4.1 - 11.1 K/uL    RBC 3.61 (L) 4.10 - 5.70 M/uL    HGB 12.2 12.1 - 17.0 g/dL    HCT 34.5 (L) 36.6 - 50.3 %    MCV 95.6 80.0 - 99.0 FL    MCH 33.8 26.0 - 34.0 PG    MCHC 35.4 30.0 - 36.5 g/dL    RDW 13.4 11.5 - 14.5 %    PLATELET 865 (H) 328 - 400 K/uL    NEUTROPHILS 77 (H) 32 - 75 %    LYMPHOCYTES 7 (L) 12 - 49 %    MONOCYTES 16 (H) 5 - 13 %    EOSINOPHILS 0 0 - 7 %    BASOPHILS 0 0 - 1 %    ABS. NEUTROPHILS 9.2 (H) 1.8 - 8.0 K/UL    ABS. LYMPHOCYTES 0.8 0.8 - 3.5 K/UL    ABS. MONOCYTES 1.9 (H) 0.0 - 1.0 K/UL    ABS. EOSINOPHILS 0.0 0.0 - 0.4 K/UL    ABS. BASOPHILS 0.0 0.0 - 0.1 K/UL   METABOLIC PANEL, COMPREHENSIVE    Collection Time: 11/21/17  8:23 PM   Result Value Ref Range    Sodium 132 (L) 136 - 145 mmol/L    Potassium 3.8 3.5 - 5.1 mmol/L    Chloride 100 97 - 108 mmol/L    CO2 22 21 - 32 mmol/L    Anion gap 10 5 - 15 mmol/L    Glucose 107 (H) 65 - 100 mg/dL    BUN 25 (H) 6 - 20 MG/DL    Creatinine 1.71 (H) 0.70 - 1.30 MG/DL    BUN/Creatinine ratio 15 12 - 20      GFR est AA 46 (L) >60 ml/min/1.73m2    GFR est non-AA 38 (L) >60 ml/min/1.73m2    Calcium 8.4 (L) 8.5 - 10.1 MG/DL    Bilirubin, total 0.8 0.2 - 1.0 MG/DL    ALT (SGPT) 15 12 - 78 U/L    AST (SGOT) 16 15 - 37 U/L    Alk.  phosphatase 78 45 - 117 U/L    Protein, total 7.4 6.4 - 8.2 g/dL    Albumin 3.1 (L) 3.5 - 5.0 g/dL    Globulin 4.3 (H) 2.0 - 4.0 g/dL    A-G Ratio 0.7 (L) 1.1 - 2.2     URIC ACID    Collection Time: 11/21/17  8:23 PM   Result Value Ref Range    Uric acid 7.1 3.5 - 7.2 MG/DL

## 2017-11-22 NOTE — PROGRESS NOTES
[facility] completed d/c assessment with pt. Pt received OBSERVATION/MOON letter (signed) placed in chart. Pt will have transportation home, and family support to assist with his home needs. Pt aware that his nurse will review d/c plans.     Rivka Apple, MSW LEIDA  [phone]

## 2017-11-22 NOTE — PROGRESS NOTES
CM attempted contact with pt's son: Veronica Sandoval ([phone] or [phone]), via telephone, regarding pt's d/c needs and future plans. However, attempt was unsuccessful and CM left a voicemail requesting a return call. CM will continue to follow up with pt and make referrals as deemed necessary. UPDATE: 3:44AM    CM received contact call from pt's son: Veronica Sandoval. Pt express that he does live out of [facility], but have plans to come to [place] to visit pt, by Friday. Veronica Sandoval reported that he will contact his sister that resides in [place] and inform her of what is currently happening with pt. CM gave Veronica Sandoval, floor contact information and room number.   CM leave FYI on pt's chart regarding information to MD.    Yuniel Brown, MSW LEIDA  [phone]

## 2017-11-22 NOTE — PROGRESS NOTES
Problem: Mobility Impaired (Adult and Pediatric)  Goal: *Acute Goals and Plan of Care (Insert Text)  Physical Therapy Goals  Initiated 11/22/2017  1. Patient will move from supine to sit and sit to supine , scoot up and down and roll side to side in bed with supervision/set-up within 7 day(s). 2.  Patient will transfer from bed to chair and chair to bed with supervision/set-up using the least restrictive device within 7 day(s). 3.  Patient will perform sit to stand with supervision/set-up within 7 day(s). 4.  Patient will ambulate with supervision/set-up for 100 feet with the least restrictive device within 7 day(s). 5.  Patient will ascend/descend 12 stairs with 1 handrail(s) with supervision/set-up within 7 day(s). physical Therapy EVALUATION  Patient: Luisana Devi (80 y.o. male)  Date: 11/22/2017  Primary Diagnosis: Inability to walk        Precautions: falls       ASSESSMENT :  Based on the objective data described below, the patient presents with confusion which limits patient's ability to follow commands and  impairs safety awareness. Patient demonstrates generalized weakness and impaired functional mobility, balance and endurance. Patient requiring min A for transfers and ambulation. He requires frequent VC for safety. Patient only able to ambulate 30 feet using RW for support. Patient has crutches in room but he is not safe to attempt ambulation with crutches and recommend use of RW during ambulation. Patient is poor historian overall but reports that he lives alone and is independent and driving at baseline. Patient is not safe to return home alone secondary to current cognitive deficits and impaired functional mobility. Recommend SNF following discharge. If patient is to return to home he will require 24 hour supervision/assistance and HHPT    Patient will benefit from skilled intervention to address the above impairments.   Patients rehabilitation potential is considered to be Fair  Factors which may influence rehabilitation potential include:   []         None noted  [x]         Mental ability/status  []         Medical condition  [x]         Home/family situation and support systems  [x]         Safety awareness  []         Pain tolerance/management  []         Other:      PLAN :  Recommendations and Planned Interventions:  [x]           Bed Mobility Training             []    Neuromuscular Re-Education  [x]           Transfer Training                   []    Orthotic/Prosthetic Training  [x]           Gait Training                         []    Modalities  [x]           Therapeutic Exercises           []    Edema Management/Control  [x]           Therapeutic Activities            [x]    Patient and Family Training/Education  [x]           Other (comment):stairs    Frequency/Duration: Patient will be followed by physical therapy  5 times a week to address goals. Discharge Recommendations: Aki Meyer  Further Equipment Recommendations for Discharge: TBD by rehab     SUBJECTIVE:   Patient stated [facility] knee feels much better today.     OBJECTIVE DATA SUMMARY:   HISTORY:    Past Medical History:   Diagnosis Date    Abdominal aortic aneurysm ([facility] [address]     Arthritis     CAD (coronary artery disease)     bypass    Hypertension     Other ill-defined conditions(799.89)     gout    Other ill-defined conditions(799.89)     high cholesterol     Past Surgical History:   Procedure Laterality Date    ABDOMEN SURGERY PROC UNLISTED      hernia    CARDIAC SURG PROCEDURE UNLIST      bypass surgery    HX CATARACT REMOVAL      HX TONSILLECTOMY       Prior Level of Function/Home Situation: pt poor historian but reports independence at baseline      [address]: Apartment  # Steps to Enter: 15  Rails to Enter: Yes  Hand Rails : Bilateral  Living Alone: Yes  Support Systems: Friends \ neighbors  Current DME Used/Available at Home: Crutches  Tub or Shower Type: Tub/Shower combination    EXAMINATION/PRESENTATION/DECISION MAKING:   Critical Behavior:  Neurologic State: Alert  Orientation Level: Oriented to person, Disoriented to time, Disoriented to place, Disoriented to situation        Hearing: Auditory  Auditory Impairment: None    Range Of Motion:  AROM: Generally decreased, functional                       Strength:    Strength: Generally decreased, functional                    Tone & Sensation:   Tone: Normal                              Coordination:  Coordination: Generally decreased, functional  Vision:      Functional Mobility:  Bed Mobility:     Supine to Sit: Supervision     Scooting: Supervision  Transfers:  Sit to Stand: Minimum assistance  Stand to Sit: Minimum assistance        Bed to Chair: Minimum assistance              Balance:   Sitting: Intact  Standing: Impaired  Standing - Static: Fair (poor initially with posterior lean noted)  Standing - Dynamic : Fair  Ambulation/Gait Training:  Distance (ft): 30 Feet (ft)  Assistive Device: Walker, rolling  Ambulation - Level of Assistance: Minimal assistance        Gait Abnormalities: Decreased step clearance; Path deviations        Base of Support: Widened     Speed/Darlene: Pace decreased (<100 feet/min); Shuffled; Slow  Step Length: Left shortened;Right shortened      Gait is slow and shuffled; unsteady but no overt LOB noted         Functional Measure:    Elder Mobility Scale    4/20         EMS and G-code impairment scale:  Percentage of impairment CH  0% CI  1-19% CJ  20-39% CK  40-59% CL  60-79% CM  80-99% CN  100%   EMS Score 0-20 20 17-19 13-16 9-12 5-8 1-4 0      Scores under 10  generally these patients are dependent in mobility maneuvers; require help with  basic ADL, such as transfers, toileting and dressing. Scores between 10  13  generally these patients are borderline in terms of safe mobility and  independence in ADL i.e. they require some help with some mobility maneuvers.     Scores over 14  Generally these patients are able to perform mobility maneuvers alone and safely  and are independent in basic ADL. G codes: In compliance with CMSs Claims Based Outcome Reporting, the following G-code set was chosen for this patient based on their primary functional limitation being treated: The outcome measure chosen to determine the severity of the functional limitation was the Elderly Mobility scale with a score of 4/20 which was correlated with the impairment scale. ? Mobility - Walking and Moving Around:     - CURRENT STATUS: CM - 80%-99% impaired, limited or restricted    - GOAL STATUS: CJ - 20%-39% impaired, limited or restricted    - D/C STATUS:  ---------------To be determined---------------          Pain:  Pain Scale 1: Numeric (0 - 10)  Pain Intensity 1: 0          After treatment:   [x]         Patient left in no apparent distress sitting up in chair  []         Patient left in no apparent distress in bed  [x]         Call bell left within reach  [x]         Nursing notified  [x]         Caregiver present  [x]         Bed alarm activated    COMMUNICATION/EDUCATION:   The patients plan of care was discussed with: Registered Nurse and . [x]         Fall prevention education was provided and the patient/caregiver indicated understanding. []         Patient/family have participated as able in goal setting and plan of care. []         Patient/family agree to work toward stated goals and plan of care. []         Patient understands intent and goals of therapy, but is neutral about his/her participation. [x]         Patient is unable to participate in goal setting and plan of care.     Thank you for this referral.  Carlotta Ron, PT   Time Calculation: 28 mins

## 2017-11-22 NOTE — H&P
Hospitalist Admission Note    NAME: Shannan Moody. :  3/15/1933   MRN:  [id number]     Date/Time:  2017 3:18 AM    Patient PCP: Jada Malhotra MD  ________________________________________________________________________    Given the patient's current clinical presentation, I have a high level of concern for decompensation if discharged from the ED. Complex decision making was performed which includes reviewing the patient's available past medical records, laboratory results, and Xray films. I have also directly communicated my plan and discussed this case with the involved ED physician. 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  No role for prednisone taper  -not safe to use indocin given renal failure  -PT to see and evaluate - he does not seem to demonstrate safety with use of crutches in ED  -CM to see for next steps  -continue pain medicine prn  -ortho to continue to follow for needs and OP F/u  -need to consider next medications to use given he should no longer use colchicine given his renal faiulre  -will need PCP to follow OP and OP ortho  -high fall risk     Chronic kidney disease, stage 3b, unclear albuminuria baseline  hyponatremia  -continue OP open discussions with PCP and Nephrology as he is doing with PCP  -dc colchicine  -continue htn regimen  -frequent labs to monitor baseline functionality with PCP outpatient  -adjust all medications for renal dysfunction  -repeat BMP this AM to ensure the sodium is not dropping further.   -do not suspect AMS seems more chronic in nature although with low sodium need to ensure no evidence for delirium - send repeat BMP as above and UA     Hypertension, benign essential  Coronary artery disease  hypercholesterolemia  -remains on toprol and lasix  -PCP to continue discussions with regard to statin use  -careful use of medicine with attention to his CKD  -continues on lasix for now - suggest continued frequent lab monitoring over this time    I have personally reviewed the radiographs, laboratory data in Epic and decisions and statements above are based partially on this personal interpretation. Code Status: Full Code  DVT Prophylaxis: Hep SQ  GI Prophylaxis: PPI          Subjective:   CHIEF COMPLAINT: \"my knee is not right\"    HISTORY OF PRESENT ILLNESS:     Nadine Hodgson is a 80 y.o.  male whom presents with complaint noted above. Patient notes hx of gout with flares presents with left knee pain and swelling over last 2.5days. He notes no fevers, no redness/streaking. No trauma. Notes no falls on the knee but decreased mobility. Unable to bear weight today and came to ED. Notes not taking colchicine as is in his medication list. Not recently on steroids. Did not call his PCP prior to arrival in ED. He has never had fluid removed from a joint he could recall. Notes that he has had gout \"practically in all my joints, but seems worse in this left ankle and knee\". ED showed no DVT. S/p left knee arthrocentesis and 120cc straw fluid removed. Orthopedics has seen and evaluated in ED and post depomedrol and lidocaine injection patient still unable to ambulate safely with shuffling gait and inability to comprehend the crutches.  Given exhaustion of ability to find a safe dc in the ED given elevated fall risk, we were asked to observe further given his inability to ambulate on the knee.        Past Medical History:   Diagnosis Date    Abdominal aortic aneurysm ([address]     Arthritis     CAD (coronary artery disease)     bypass    Hypertension     Other ill-defined conditions(799.89)     gout    Other ill-defined conditions(799.89)     high cholesterol      Past Surgical History:   Procedure Laterality Date    ABDOMEN SURGERY PROC UNLISTED      hernia    CARDIAC SURG PROCEDURE UNLIST      bypass surgery    HX CATARACT REMOVAL      HX TONSILLECTOMY       Social History   Substance Use Topics    Smoking status: Former Smoker    Smokeless tobacco: Never Used    Alcohol use No      Family History   Problem Relation Age of Onset    Cancer Mother      liver    Hypertension Father     No Known Problems Sister     No Known Problems Brother     No Known Problems Sister      brain aneurysym        No Known Allergies     Prior to Admission medications    Medication Sig Start Date End Date Taking? Authorizing Provider   famotidine (PEPCID) 20 mg tablet Take 1 Tab by mouth two (2) times a day. 5/13/16   Vicenta Enamorado MD   atropine-PHENobarbital-scopolamine-hyoscyamine [facility]) 16.2-0.1037 -0.0194 mg per tablet Take 1 Tab by mouth every six (6) hours as needed for Pain. Max Daily Amount: 4 Tabs. 5/13/16   Vicenta Enamorado MD   HYDROcodone-acetaminophen (NORCO) 5-325 mg per tablet Take 2 tablets by mouth every four (4) hours as needed. 11/9/14   Cristhian Villanueva MD   tamsulosin (FLOMAX) 0.4 mg capsule Take 1 capsule by mouth daily. 11/9/14   Cristhian Villanueva MD   furosemide (LASIX) 20 mg tablet Take 20 mg by mouth every other day. Indications: EDEMA    Historical Provider   colchicine 0.6 mg tablet Take 0.6 mg by mouth daily as needed. Indications: GOUT    Historical Provider   doxazosin (CARDURA) 4 mg tablet Take 4 mg by mouth nightly. Indications: HYPERTENSION    Historical Provider   metoprolol-XL (TOPROL-XL) 50 mg XL tablet Take 1 Tab by mouth daily.  5/22/13   SANDEEP Kenney     REVIEW OF SYSTEMS:  See HPI for details  General: negative for fever, chills, sweats, weakness, weight loss  Eyes: negative for blurred vision, eye pain, loss of vision, diplopia  Ear Nose and Throat: negative for rhinorrhea, pharyngitis, otalgia, tinnitus, speech or swallowing difficulties  Respiratory:  negative for cough, sputum production, SOB, wheezing, URENA, pleuritic pain  Cardiology:  negative for chest pain, palpitations, orthopnea, PND, edema, syncope   Gastrointestinal: negative for abdominal pain, N/V, dysphagia, change in bowel habits, bleeding  Genitourinary: negative for frequency, urgency, dysuria, hematuria, incontinence  Muskuloskeletal : +left knee arthralgia and decreased rom. +edema left ankle and knee.  No myalgia  Hematology: negative for easy bruising, bleeding, lymphadenopathy  Dermatological: negative for rash, ulceration, mole change, new lesion  Endocrine: negative for hot flashes or polydipsia  Neurological: negative for headache, dizziness, confusion, focal weakness, paresthesia, memory loss, gait disturbance  Psychological: negative for anxiety, depression, agitation       Objective:   VITALS:    Visit Vitals    /80    Pulse 66    Temp 98.4 °F (36.9 °C)    Resp 15    Ht 6' (1.829 m)    Wt 81.6 kg (180 lb)    SpO2 96%    BMI 24.41 kg/m2     PHYSICAL EXAM:     GENERAL:     WD y   WN y   Cachectic     Thin y   Obese     Disheveled n   Ill Appearing Critically n   Ill Appearing Chronically y   Acute Distress n   Other        HEENT:     NC/AT/EOMI y   PERRLA y   Conjunctivae Pink     Conjunctivae Pale y   Moist Mucosa y   Dry Mucosa     Hearing intact to voice y   Other        NECK:     Supple y   Masses n   Thyroid Tender n   Other         RESPIRATORY:     CTA bilaterally WITHOUT wheezing/rhonchi/rales or crackles y   Wheezing     Rhonchi     Crackles     Use of accessory muscles n   Other        CARDIAC:     regular rate and rhythm No murmurs/rubs/gallops y   Murmur     Rubs     Gallops n   Rate Regular/Irregular reg   Carotid Bruit Left/Right n   Lower Extremity Edema n   JVP  n   Other        ABDOMEN:     soft/non distended non tender +bowel sounds no HSM y   Rigid     Tenderness     Hepatomegaly     Splenomegaly     Distended n   Normal/Hyper/Hypo Active Bowel Sounds nml   Other        SKIN / MUSCULOSKELETAL:     Rashes n   Ecchymosis n   Ulcers n   Tight to palpitation     Turgor Good/Poor     Cyanosis/Clubbing n   Amputation(s)     Other y - left ankle edema 2+, left knee s/p arthrocentesis. Decreased ROM without pain. Noted bilateral DIP chronic gouty tofi      NEUROLOGY:     cranial nerves II-XII grossly intact y   Cranial Nerve Deficit     Facial Droop     Slurred Speech n   Aphasia     Strength Normal y   Weakness y left leg, decreased ROM although improved from prior exam post injection   Meningismus/Kernig's Sign/ Brudzinsky n   Follows Commands y   Other        PSYCHIATRIC:     AAOx3 in no acute distress y   Insight Poor     Insight Good y   Alert and Oriented to Person      Alert and Oriented to Place     Alert and Oriented toTime     Depressed     Anxious n   Agitated n   Lethargic n   Stuporous n   Sedated     Other       _______________________________________________________________________  Care Plan discussed with:    Comments   Patient x Discussed with patient in room. POC outlined and Questions answered (21   Family      RN x    Care Manager                    Consultant:  shady JIMÉNEZ MD 15, ortho 10   _______________________________________________________________________  Recommended Disposition:   Home with Family    HH/PT/OT/RN    SNF/LTC y   MARCELLE    ________________________________________________________________________  TOTAL TIME:  72 Minutes total (combination of prior work and this work)    [address] Provided     Minutes non procedure based      Comments   >50% of visit spent in counseling and coordination of care x Chart review  Discussion with patient and/or family and questions answered     ________________________________________________________________________  Signed: Rosette Forrester MD    This note will not be viewable in [address]       Procedures: see electronic medical records for all procedures/Xrays and details which were not copied into this note but were reviewed prior to creation of Plan. LAB DATA REVIEWED:    Recent Results (from the past 24 hour(s))   CBC WITH AUTOMATED DIFF    Collection Time: 11/21/17  7:46 PM   Result Value Ref Range    WBC 12.0 (H) 4.1 - 11.1 K/uL    RBC 3.61 (L) 4.10 - 5.70 M/uL    HGB 12.2 12.1 - 17.0 g/dL    HCT 34.5 (L) 36.6 - 50.3 %    MCV 95.6 80.0 - 99.0 FL    MCH 33.8 26.0 - 34.0 PG    MCHC 35.4 30.0 - 36.5 g/dL    RDW 13.4 11.5 - 14.5 %    PLATELET 490 (H) 002 - 400 K/uL    NEUTROPHILS 77 (H) 32 - 75 %    LYMPHOCYTES 7 (L) 12 - 49 %    MONOCYTES 16 (H) 5 - 13 %    EOSINOPHILS 0 0 - 7 %    BASOPHILS 0 0 - 1 %    ABS. NEUTROPHILS 9.2 (H) 1.8 - 8.0 K/UL    ABS. LYMPHOCYTES 0.8 0.8 - 3.5 K/UL    ABS. MONOCYTES 1.9 (H) 0.0 - 1.0 K/UL    ABS. EOSINOPHILS 0.0 0.0 - 0.4 K/UL    ABS. BASOPHILS 0.0 0.0 - 0.1 K/UL   METABOLIC PANEL, COMPREHENSIVE    Collection Time: 11/21/17  8:23 PM   Result Value Ref Range    Sodium 132 (L) 136 - 145 mmol/L    Potassium 3.8 3.5 - 5.1 mmol/L    Chloride 100 97 - 108 mmol/L    CO2 22 21 - 32 mmol/L    Anion gap 10 5 - 15 mmol/L    Glucose 107 (H) 65 - 100 mg/dL    BUN 25 (H) 6 - 20 MG/DL    Creatinine 1.71 (H) 0.70 - 1.30 MG/DL    BUN/Creatinine ratio 15 12 - 20      GFR est AA 46 (L) >60 ml/min/1.73m2    GFR est non-AA 38 (L) >60 ml/min/1.73m2    Calcium 8.4 (L) 8.5 - 10.1 MG/DL    Bilirubin, total 0.8 0.2 - 1.0 MG/DL    ALT (SGPT) 15 12 - 78 U/L    AST (SGOT) 16 15 - 37 U/L    Alk.  phosphatase 78 45 - 117 U/L    Protein, total 7.4 6.4 - 8.2 g/dL    Albumin 3.1 (L) 3.5 - 5.0 g/dL    Globulin 4.3 (H) 2.0 - 4.0 g/dL    A-G Ratio 0.7 (L) 1.1 - 2.2     URIC ACID    Collection Time: 11/21/17  8:23 PM   Result Value Ref Range    Uric acid 7.1 3.5 - 7.2 MG/DL   CELL COUNT, BODY FLUID    Collection Time: 11/21/17 10:29 PM   Result Value Ref Range    BODY FLUID TYPE SYNOVIAL FLUID      FLUID COLOR YELLOW      FLUID APPEARANCE OPAQUE      FLUID RBC CT. >100 /cu mm    FLUID WBC COUNT 10304 (H) 0 - 5 /cu mm    FLD NEUTROPHILS 90 % FLD MONO/MACROPHAGES 10 %   CRYSTALS, SYNOVIAL FLUID    Collection Time: 11/21/17 10:29 PM   Result Value Ref Range    FLUID TYPE(7) LEFT KNEE      Crystals, body fluid FEW INTRACELLULAR    CULTURE, BODY FLUID W GRAM STAIN    Collection Time: 11/21/17 10:29 PM   Result Value Ref Range    Special Requests: NO SPECIAL REQUESTS      GRAM STAIN 4+ WBCS SEEN      GRAM STAIN NO ORGANISMS SEEN      Culture result: PENDING

## 2017-11-22 NOTE — ED NOTES
Pt attempted to ambulate using crutches. Patient is unable to lift LLE. Patient is unable to comprehend the use of crutches and having to keep all weight off of left foot.

## 2017-11-22 NOTE — PROGRESS NOTES
Hospitalist Progress Note    NAME: Vanna Blizzard. 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 No role for prednisone taper  -not safe to use indocin given renal failure  Left knee pain. .due to above. CKD-3b  Hyponatremia. ..stable. HTN. ..poorly controlled; not malignant htn. CAD  Hypercholesterolemia    PT/OT  Ortho consult appreciated. Colchicine stopped due to renal disease. Cont antihypertensive meds  Add norvasc  Will need follow up w/ pcp and ortho. Will need to determine further med treatment for his gout  Plan for dc tomorrow if stable. 81 yo wm w/ pmhx cad, htn, gout, ckd, and elevated cholesterol who presented to ED for left knee pain for several days. No trauma, falls, fevers, redness. He was unable to bear weight with left leg. Left venous doppler neg. Left knee arthrocentesis done; 120 cc straw colored fluid removed. Ortho consulted. Admitted for observation. Patient had received ivfluids on admission. He had mild hyponatremia which was better. IV fluids stopped. PT/OT consulted. Body mass index is 24.41 kg/(m^2). Code status: Full  Prophylaxis: Hep SQ  Recommended Disposition: Home w/Family     Subjective:     Chief Complaint / Reason for Physician Visit  \"My leg feels better\". Follow up for left knee pain. Much less pain in leg. No cp/sob. Review of Systems:  Symptom Y/N Comments  Symptom Y/N Comments   Fever/Chills N   Chest Pain N    Poor Appetite N   Edema Y    Cough N   Abdominal Pain N    Sputum N   Joint Pain  BETTER   SOB/URENA N   Pruritis/Rash N    Nausea/vomit N   Tolerating PT/OT     Diarrhea N   Tolerating Diet Y    Constipation N   Other       Could NOT obtain due to:      Objective:     VITALS:   Last 24hrs VS reviewed since prior progress note.  Most recent are:  Patient Vitals for the past 24 hrs:   Temp Pulse Resp BP SpO2   11/22/17 1006 - 94 - (!) 171/91 -   11/22/17 1005 97.5 °F (36.4 °C) 82 16 (!) 185/111 -   11/22/17 0529 98.2 °F (36.8 °C) 73 16 (!) 159/95 100 %   11/22/17 0430 - 66 15 138/86 96 %   11/22/17 0415 - 68 21 (!) 156/91 94 %   11/22/17 0400 - 72 17 142/90 97 %   11/22/17 0346 - 68 18 137/72 95 %   11/22/17 0330 - 70 16 (!) 134/91 97 %   11/22/17 0315 - 73 19 146/88 97 %   11/22/17 0308 - - - (!) 148/99 -   11/22/17 0245 - 66 15 138/80 96 %   11/22/17 0220 98.4 °F (36.9 °C) 63 16 148/84 96 %   11/22/17 0145 - 64 19 140/75 97 %   11/22/17 0130 - 74 19 149/79 100 %   11/22/17 0115 - 62 15 134/77 98 %   11/22/17 0100 - 63 20 129/69 99 %   11/22/17 0030 - 69 20 121/72 98 %   11/22/17 0015 - 66 19 137/71 98 %   11/22/17 0002 - - 16 115/74 99 %   11/22/17 0001 98.4 °F (36.9 °C) 66 - - 100 %   11/21/17 2358 - 77 22 (!) 134/93 -   11/21/17 2315 - - - 148/75 95 %   11/21/17 2310 98.4 °F (36.9 °C) 82 16 147/87 95 %   11/21/17 2240 98.2 °F (36.8 °C) 79 17 142/89 100 %   11/21/17 2109 97.4 °F (36.3 °C) 77 17 144/86 99 %   11/21/17 2003 97.8 °F (36.6 °C) 74 15 154/88 100 %   11/21/17 1835 97.6 °F (36.4 °C) 75 16 (!) 161/95 100 %       Intake/Output Summary (Last 24 hours) at 11/22/17 1021  Last data filed at 11/22/17 0553   Gross per 24 hour   Intake                0 ml   Output              650 ml   Net             -650 ml        PHYSICAL EXAM:  General: WD, WN. Alert, cooperative, no acute distress    EENT:  EOMI. Anicteric sclerae. MMM  Resp:  CTA bilaterally, no wheezing or rales. No accessory muscle use  CV:  Regular  rhythm,  1+ Left leg swelling; No swelling right leg. GI:  Soft, Non distended, Non tender.  +Bowel sounds  Neurologic:  Alert and oriented X 3, normal speech,   Psych:   Good insight. Not anxious nor agitated  Skin:  No rashes.   No jaundice    Reviewed most current lab test results and cultures  YES  Reviewed most current radiology test results   YES  Review and summation of old records today    NO  Reviewed patient's current orders and MAR    YES  PMH/SH reviewed - no change compared to H&P  ________________________________________________________________________  Care Plan discussed with:    Comments   Patient x    Family      RN     Care Manager     Consultant                        Multidiciplinary team rounds were held today with , nursing, pharmacist and clinical coordinator. Patient's plan of care was discussed; medications were reviewed and discharge planning was addressed. ________________________________________________________________________  Total NON critical care TIME:  15  Minutes    Total CRITICAL CARE TIME Spent:   Minutes non procedure based      Comments   >50% of visit spent in counseling and coordination of care     ________________________________________________________________________  Alexandro Sands MD     Procedures: see electronic medical records for all procedures/Xrays and details which were not copied into this note but were reviewed prior to creation of Plan. LABS:  I reviewed today's most current labs and imaging studies.   Pertinent labs include:  Recent Labs      11/21/17   1946   WBC  12.0*   HGB  12.2   HCT  34.5*   PLT  438*     Recent Labs      11/22/17   0355  11/21/17 2023   NA  133*  132*   K  3.8  3.8   CL  103  100   CO2  18*  22   GLU  127*  107*   BUN  25*  25*   CREA  1.44*  1.71*   CA  8.0*  8.4*   ALB   --   3.1*   TBILI   --   0.8   SGOT   --   16   ALT   --   15       Signed: Alexandro Sands MD

## 2017-11-22 NOTE — ED NOTES
Pt ambulation attempted at this time with assist x 2. Pt still unable to stand up straight or put weight on LLE. Pt unable to lift LLE off of the floor. Pt slides L foot across the floor when trying to ambulate.   ER MD and hospitalist notified

## 2017-11-22 NOTE — ED PROVIDER NOTES
[facility] [address]  EMERGENCY DEPARTMENT HISTORY AND PHYSICAL EXAM       Date of Service: 11/21/2017   Patient Name: Abbie Black. YOB: 1933  Medical Record Number: [id number]    History of Presenting Illness     Chief Complaint   Patient presents with    Knee Pain     Presents to the ED [facility] with c/o non-traumatic LLE swelling and Lt knee pain x several days. History Provided By:  patient    Additional History:   Abbie Black. is a 80 y.o. male with PMhx significant for CAD, HTN, and gout who presents via EMS to the ED with cc of left knee pain and swelling x 2 days. Pt reports that the pain was so severe that he was unable to put pressure on his left leg. He states he has had gout in the past and states his sx are similar to past episodes. Pt states he has never had to have fluid drained. He denies blood thinner use. Pt states he lives by himself. Denies fever or chills, chest pain, SOB, abd pain, or recent LE trauma or injury. Social Hx: - Tobacco, - EtOH, - Illicit Drugs    There are no other complaints, changes or physical findings at this time.     Primary Care Provider: Ludy Baxter MD     Past History     Past Medical History:   Past Medical History:   Diagnosis Date    Abdominal aortic aneurysm ([facility] [address]     Arthritis     CAD (coronary artery disease)     bypass    Hypertension     Other ill-defined conditions(799.89)     gout    Other ill-defined conditions(799.89)     high cholesterol        Past Surgical History:   Past Surgical History:   Procedure Laterality Date    ABDOMEN SURGERY PROC UNLISTED      hernia    CARDIAC SURG PROCEDURE UNLIST      bypass surgery    HX CATARACT REMOVAL      HX TONSILLECTOMY          Family History:   Family History   Problem Relation Age of Onset    Cancer Mother      liver    Hypertension Father     No Known Problems Sister     No Known Problems Brother     No Known Problems Sister brain aneurysym        Social History:   Social History   Substance Use Topics    Smoking status: Former Smoker    Smokeless tobacco: Never Used    Alcohol use No        Allergies:   No Known Allergies      Review of Systems   Review of Systems   Constitutional: Negative for chills and fever. HENT: Negative for congestion. Eyes: Negative for visual disturbance. Respiratory: Negative for chest tightness. Cardiovascular: Negative for chest pain and leg swelling. Gastrointestinal: Negative for abdominal pain and vomiting. Endocrine: Negative for polyuria. Genitourinary: Negative for dysuria and frequency. Musculoskeletal: Positive for arthralgias (left knee) and joint swelling (left knee). Negative for myalgias. Skin: Negative for color change. Allergic/Immunologic: Negative for immunocompromised state. Neurological: Negative for numbness. Physical Exam  Physical Exam  Nursing note and vitals reviewed. General appearance: non-toxic, minimally uncomfortable  Eyes: PERRL, EOMI, conjunctiva normal, anicteric sclera  HEENT: mucous membranes moist, oropharynx is clear  Pulmonary: clear to auscultation bilaterally  Cardiac: normal rate and regular rhythm, no murmurs, gallops, or rubs, 2+DP pulses, 2+ radial pulses  Abdomen: soft, nontender, nondistended, bowel sounds present  MSK: 1-2+ edema in LLE, left knee effusion, ROM restricted secondary to effusion, no erythema, no warmth, no signs of cellulitis or septic joint  Neuro: Alert, answers questions appropriately  Skin: capillary refill brisk      Medical Decision Making   I am the first provider for this patient. I reviewed the vital signs, available nursing notes, past medical history, past surgical history, family history and social history. Old Medical Records: Old medical records. Provider Notes:   DDx: gout, pseudogout, OA.  Low suspicion for septic arthritis, cellulitis, DVT      ED Course:  7:24 PM   Initial assessment performed. The patients presenting problems have been discussed, and they are in agreement with the care plan formulated and outlined with them. I have encouraged them to ask questions as they arise throughout their visit. Progress Notes:   10:40 PM  Pt unable to ambulate. Consult Note:  10:50 PM  Maco Koch MD spoke with Josef Santamaria MD  Specialty: Hospitalist  Discussed pt's hx, disposition, and available diagnostic and imaging results. Reviewed care plans. Dr. Paulette Bellamy will evaluate pt.    10:58 PM  [facility]rich Texted orthopedic surgery. 11:28 PM  No return text at this point    SIGN OUT:  11:52 PM  Patient's presentation, labs/imaging and plan of care was reviewed with Marcial Duverney, MD as part of sign out. Nehal Davies's assistance in completion of this plan is greatly appreciated but it should be noted that I will be the provider of record for this patient. Maco Koch MD    Procedures:   Procedure Note - Arthrocentesis:   10:12 PM  Performed by Maco Koch MD.     Immediately prior to the procedure, the patient was reevaluated and found suitable for the planned procedure and any planned medications. Immediately prior to the procedure a time out was called to verify the correct patient, procedure, equipment, staff, and marking as appropriate. Indication for procedure: Symptomatic relief of large effusion  Approach: lateral  The site prepped with Betadine. Sterile field established. Anesthesia was obtained with 3mLs of Lidocaine 1% with epinephrine. Left tibial femoral joint was entered, using a 18 gauge needle, and 120 cc's of straw colored/turbid fluid was withdrawn and sent for aerobic culture, cell count & diff, crystal analysis and gram stain. Estimated blood loss: none  The procedure took 16-30 minutes, and pt tolerated well. Progress Note:  1:39 AM  Conner Ceja PA-C performed a left knee joint aspiration and injection. He reports 60ccs of fluid was removed. Depo-mederol and 1%lidocaine (40:6) injected into left knee. Reports the pt is feeling better after the procedure. CONSULT NOTE:   2:18 AM  Hua Alvarado MD spoke with Dr. Eliot Segovia,   Specialty: Hospitalist  Discussed pt's hx, disposition, and available diagnostic and imaging results. Reviewed care plans. Consultant will evaluate pt for admission. Written by Augustin Werner ED Scribe, as dictated by Hua Alvarado MD.      Diagnostic Study Results   Labs -  Recent Results (from the past 12 hour(s))   CBC WITH AUTOMATED DIFF    Collection Time: 11/21/17  7:46 PM   Result Value Ref Range    WBC 12.0 (H) 4.1 - 11.1 K/uL    RBC 3.61 (L) 4.10 - 5.70 M/uL    HGB 12.2 12.1 - 17.0 g/dL    HCT 34.5 (L) 36.6 - 50.3 %    MCV 95.6 80.0 - 99.0 FL    MCH 33.8 26.0 - 34.0 PG    MCHC 35.4 30.0 - 36.5 g/dL    RDW 13.4 11.5 - 14.5 %    PLATELET 762 (H) 573 - 400 K/uL    NEUTROPHILS 77 (H) 32 - 75 %    LYMPHOCYTES 7 (L) 12 - 49 %    MONOCYTES 16 (H) 5 - 13 %    EOSINOPHILS 0 0 - 7 %    BASOPHILS 0 0 - 1 %    ABS. NEUTROPHILS 9.2 (H) 1.8 - 8.0 K/UL    ABS. LYMPHOCYTES 0.8 0.8 - 3.5 K/UL    ABS. MONOCYTES 1.9 (H) 0.0 - 1.0 K/UL    ABS. EOSINOPHILS 0.0 0.0 - 0.4 K/UL    ABS. BASOPHILS 0.0 0.0 - 0.1 K/UL   METABOLIC PANEL, COMPREHENSIVE    Collection Time: 11/21/17  8:23 PM   Result Value Ref Range    Sodium 132 (L) 136 - 145 mmol/L    Potassium 3.8 3.5 - 5.1 mmol/L    Chloride 100 97 - 108 mmol/L    CO2 22 21 - 32 mmol/L    Anion gap 10 5 - 15 mmol/L    Glucose 107 (H) 65 - 100 mg/dL    BUN 25 (H) 6 - 20 MG/DL    Creatinine 1.71 (H) 0.70 - 1.30 MG/DL    BUN/Creatinine ratio 15 12 - 20      GFR est AA 46 (L) >60 ml/min/1.73m2    GFR est non-AA 38 (L) >60 ml/min/1.73m2    Calcium 8.4 (L) 8.5 - 10.1 MG/DL    Bilirubin, total 0.8 0.2 - 1.0 MG/DL    ALT (SGPT) 15 12 - 78 U/L    AST (SGOT) 16 15 - 37 U/L    Alk.  phosphatase 78 45 - 117 U/L    Protein, total 7.4 6.4 - 8.2 g/dL    Albumin 3.1 (L) 3.5 - 5.0 g/dL    Globulin 4.3 (H) 2.0 - 4.0 g/dL    A-G Ratio 0.7 (L) 1.1 - 2.2     URIC ACID    Collection Time: 11/21/17  8:23 PM   Result Value Ref Range    Uric acid 7.1 3.5 - 7.2 MG/DL   CELL COUNT, BODY FLUID    Collection Time: 11/21/17 10:29 PM   Result Value Ref Range    BODY FLUID TYPE SYNOVIAL FLUID      FLUID COLOR YELLOW      FLUID APPEARANCE OPAQUE      FLUID RBC CT. >100 /cu mm    FLUID WBC COUNT 10604 (H) 0 - 5 /cu mm    FLD NEUTROPHILS 90 %    FLD MONO/MACROPHAGES 10 %   CRYSTALS, SYNOVIAL FLUID    Collection Time: 11/21/17 10:29 PM   Result Value Ref Range    FLUID TYPE(7) LEFT KNEE      Crystals, body fluid FEW INTRACELLULAR    CULTURE, BODY FLUID W GRAM STAIN    Collection Time: 11/21/17 10:29 PM   Result Value Ref Range    Special Requests: NO SPECIAL REQUESTS      GRAM STAIN 4+ WBCS SEEN      GRAM STAIN NO ORGANISMS SEEN      Culture result: PENDING    METABOLIC PANEL, BASIC    Collection Time: 11/22/17  3:55 AM   Result Value Ref Range    Sodium 133 (L) 136 - 145 mmol/L    Potassium 3.8 3.5 - 5.1 mmol/L    Chloride 103 97 - 108 mmol/L    CO2 18 (L) 21 - 32 mmol/L    Anion gap 12 5 - 15 mmol/L    Glucose 127 (H) 65 - 100 mg/dL    BUN 25 (H) 6 - 20 MG/DL    Creatinine 1.44 (H) 0.70 - 1.30 MG/DL    BUN/Creatinine ratio 17 12 - 20      GFR est AA 57 (L) >60 ml/min/1.73m2    GFR est non-AA 47 (L) >60 ml/min/1.73m2    Calcium 8.0 (L) 8.5 - 10.1 MG/DL       Radiologic Studies -  The following have been ordered and reviewed:  Study Result      EXAM:  XR KNEE LT 3 V     INDICATION: Nontraumatic left knee pain and swelling over last several days.     COMPARISON: None.     FINDINGS: Four views of the left knee demonstrate moderate diffuse osteopenia  without evidence for acute fracture or dislocation. Mild patellofemoral  osteoarthrosis is shown with patellar marginal osteophytes including a small   osteophyte proximally. There is a large knee effusion is  demonstrated.   A 5 mm subcortical cyst is shown at the proximal margin of the  patella.     IMPRESSION  IMPRESSION:  Osteopenia. Large knee effusion. .     Study Result      INDICATION:  Leg swelling, pain, DVT suspected     COMPARISON: None.     FINDINGS: Duplex Doppler sonography of the left lower extremity was performed  from the groin to the calf. The left common femoral, femoral and popliteal veins  are compressible with normal color-flow and wave forms and response to  physiologic maneuvers including Valsalva and augmentation. A small Baker's cyst  is shown as is a moderate sized knee effusion.     IMPRESSION       Vital Signs-Reviewed the patient's vital signs.    Patient Vitals for the past 12 hrs:   Temp Pulse Resp BP SpO2   11/22/17 0430 - 66 15 138/86 96 %   11/22/17 0415 - 68 21 (!) 156/91 94 %   11/22/17 0400 - 72 17 142/90 97 %   11/22/17 0346 - 68 18 137/72 95 %   11/22/17 0330 - 70 16 (!) 134/91 97 %   11/22/17 0315 - 73 19 146/88 97 %   11/22/17 0308 - - - (!) 148/99 -   11/22/17 0245 - 66 15 138/80 96 %   11/22/17 0220 98.4 °F (36.9 °C) 63 16 148/84 96 %   11/22/17 0145 - 64 19 140/75 97 %   11/22/17 0130 - 74 19 149/79 100 %   11/22/17 0115 - 62 15 134/77 98 %   11/22/17 0100 - 63 20 129/69 99 %   11/22/17 0030 - 69 20 121/72 98 %   11/22/17 0015 - 66 19 137/71 98 %   11/22/17 0002 - - 16 115/74 99 %   11/22/17 0001 98.4 °F (36.9 °C) 66 - - 100 %   11/21/17 2358 - 77 22 (!) 134/93 -   11/21/17 2315 - - - 148/75 95 %   11/21/17 2310 98.4 °F (36.9 °C) 82 16 147/87 95 %   11/21/17 2240 98.2 °F (36.8 °C) 79 17 142/89 100 %   11/21/17 2109 97.4 °F (36.3 °C) 77 17 144/86 99 %   11/21/17 2003 97.8 °F (36.6 °C) 74 15 154/88 100 %   11/21/17 1835 97.6 °F (36.4 °C) 75 16 (!) 161/95 100 %       Medications Given in the ED:  Medications   doxazosin (CARDURA) tablet 4 mg (not administered)   famotidine (PEPCID) tablet 20 mg (not administered)   furosemide (LASIX) tablet 20 mg (20 mg Oral Given 11/22/17 0412)   metoprolol succinate (TOPROL-XL) XL tablet 50 mg (not administered) tamsulosin (FLOMAX) capsule 0.4 mg (not administered)   acetaminophen (TYLENOL) tablet 650 mg (not administered)   ondansetron (ZOFRAN) injection 4 mg (not administered)   heparin (porcine) injection 5,000 Units (5,000 Units SubCUTAneous Given 11/22/17 0436)   0.9% sodium chloride infusion (not administered)   sodium chloride (NS) flush 5-10 mL (not administered)   sodium chloride (NS) flush 5-10 mL (not administered)   HYDROcodone-acetaminophen (NORCO) 5-325 mg per tablet 1 Tab (1 Tab Oral Given 11/21/17 1938)   lidocaine-EPINEPHrine (PF) (XYLOCAINE) 1 %-1:200,000 injection 45 mg (45 mg IntraDERMal Given 11/21/17 1948)   predniSONE (DELTASONE) tablet 60 mg (60 mg Oral Given 11/21/17 2305)   methylPREDNISolone (PF) (SOLU-MEDROL) injection 125 mg (125 mg IntraVENous Given 11/21/17 2319)   lidocaine (XYLOCAINE) 10 mg/mL (1 %) injection 10 mL (10 mL IntraDERMal Given 11/22/17 0048)   methylPREDNISolone acetate (DEPO-MEDROL) 40 mg/mL injection 40 mg (40 mg Intra artICUlar Given 11/22/17 0048)         Diagnosis   Clinical Impression:   1. Knee effusion, left    2. Acute pain of left knee           Disposition Note:  PLAN:  1. Admit to Hospitalist.    Admit Note:  2:18 AM  Pt is being admitted by Dr. Duncan. The results of their tests and reason(s) for their admission have been discussed with pt and/or available family. They convey agreement and understanding for the need to be admitted and for admission diagnosis. _______________________________   Attestations: This note is prepared by Shasta Jenkins, acting as Scribe for Lindon Meigs, MD      The scribe's documentation has been prepared under my direction and personally reviewed by me in its entirety. I confirm that the note above accurately reflects all work, treatment, procedures, and medical decision making performed by me.   Lindon Meigs, MD            _______________________________

## 2017-11-23 LAB
APPEARANCE UR: CLEAR
BACTERIA URNS QL MICRO: ABNORMAL /HPF
BILIRUB UR QL: NEGATIVE
COLOR UR: ABNORMAL
EPITH CASTS URNS QL MICRO: ABNORMAL /LPF
GLUCOSE UR STRIP.AUTO-MCNC: NEGATIVE MG/DL
HGB UR QL STRIP: NEGATIVE
KETONES UR QL STRIP.AUTO: NEGATIVE MG/DL
LEUKOCYTE ESTERASE UR QL STRIP.AUTO: NEGATIVE
NITRITE UR QL STRIP.AUTO: NEGATIVE
PH UR STRIP: 5 [PH] (ref 5–8)
PROT UR STRIP-MCNC: NEGATIVE MG/DL
RBC #/AREA URNS HPF: ABNORMAL /HPF (ref 0–5)
SP GR UR REFRACTOMETRY: 1.02 (ref 1–1.03)
UROBILINOGEN UR QL STRIP.AUTO: 1 EU/DL (ref 0.2–1)
WBC URNS QL MICRO: ABNORMAL /HPF (ref 0–4)

## 2017-11-23 PROCEDURE — 90471 IMMUNIZATION ADMIN: CPT

## 2017-11-23 PROCEDURE — 74011250636 HC RX REV CODE- 250/636: Performed by: INTERNAL MEDICINE

## 2017-11-23 PROCEDURE — 99218 HC RM OBSERVATION: CPT

## 2017-11-23 PROCEDURE — 74011250637 HC RX REV CODE- 250/637: Performed by: INTERNAL MEDICINE

## 2017-11-23 PROCEDURE — 90686 IIV4 VACC NO PRSV 0.5 ML IM: CPT | Performed by: INTERNAL MEDICINE

## 2017-11-23 PROCEDURE — 81001 URINALYSIS AUTO W/SCOPE: CPT

## 2017-11-23 RX ADMIN — DOXAZOSIN 4 MG: 2 TABLET ORAL at 22:07

## 2017-11-23 RX ADMIN — HEPARIN SODIUM 5000 UNITS: 5000 INJECTION, SOLUTION INTRAVENOUS; SUBCUTANEOUS at 19:09

## 2017-11-23 RX ADMIN — AMLODIPINE BESYLATE 5 MG: 5 TABLET ORAL at 09:53

## 2017-11-23 RX ADMIN — TAMSULOSIN HYDROCHLORIDE 0.4 MG: 0.4 CAPSULE ORAL at 09:55

## 2017-11-23 RX ADMIN — FAMOTIDINE 20 MG: 20 TABLET ORAL at 19:09

## 2017-11-23 RX ADMIN — INFLUENZA VIRUS VACCINE 0.5 ML: 15; 15; 15; 15 SUSPENSION INTRAMUSCULAR at 09:56

## 2017-11-23 RX ADMIN — FAMOTIDINE 20 MG: 20 TABLET ORAL at 09:53

## 2017-11-23 RX ADMIN — HEPARIN SODIUM 5000 UNITS: 5000 INJECTION, SOLUTION INTRAVENOUS; SUBCUTANEOUS at 06:29

## 2017-11-23 NOTE — PROGRESS NOTES
Hospitalist Progress Note    NAME: Maribell Lomax. 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 No role for prednisone taper  -not safe to use indocin given renal failure  Left knee pain. .due to above. CKD-3b  Hyponatremia. ..stable. HTN. ..poorly controlled; not malignant htn. CAD  Hypercholesterolemia  Confusion    PT/OT  Ortho consult appreciated. Colchicine stopped due to renal disease. Cont antihypertensive meds  Add norvasc  Will need follow up w/ pcp and ortho. Will need to determine further med treatment for his gout  Case management to be involved in dc planning. Will check urine; repeat cbc and bmp in am.    81 yo wm w/ pmhx cad, htn, gout, ckd, and elevated cholesterol who presented to ED for left knee pain for several days. No trauma, falls, fevers, redness. He was unable to bear weight with left leg. Left venous doppler neg. Left knee arthrocentesis done; 120 cc straw colored fluid removed. Ortho consulted. Admitted for observation. Patient had received ivfluids on admission. He had mild hyponatremia which was better. IV fluids stopped. PT/OT consulted. Body mass index is 24.41 kg/(m^2). Code status: Full  Prophylaxis: Hep SQ  Recommended Disposition: Home w/Family     Subjective:     Chief Complaint / Reason for Physician Visit  Patient confused. Does not offer any complaints.        Review of Systems:  Symptom Y/N Comments  Symptom Y/N Comments   Fever/Chills    Chest Pain     Poor Appetite    Edema     Cough    Abdominal Pain     Sputum    Joint Pain     SOB/URENA    Pruritis/Rash     Nausea/vomit    Tolerating PT/OT     Diarrhea    Tolerating Diet     Constipation    Other       Could NOT obtain due to: confusion     Objective:     VITALS:   Last 24hrs VS reviewed since prior progress note. Most recent are:  Patient Vitals for the past 24 hrs:   Temp Pulse Resp BP SpO2   11/23/17 1106 97.3 °F (36.3 °C) 64 14 (!) 143/93 100 %   11/23/17 0836 97.8 °F (36.6 °C) (!) 55 12 102/58 97 %   11/23/17 0300 - - 16 - -   11/22/17 2308 - - 16 - -   11/22/17 2045 97.4 °F (36.3 °C) 74 18 140/89 96 %   11/22/17 1520 97.6 °F (36.4 °C) 72 16 101/64 99 %     No intake or output data in the 24 hours ending 11/23/17 1325     PHYSICAL EXAM:  General: WD, WN. Confused, no acute distress    EENT:  EOMI. Anicteric sclerae. MMM  Resp:  CTA bilaterally, no wheezing or rales. No accessory muscle use  CV:  Regular  rhythm,  1+ Left leg swelling; No swelling right leg; no tendernes left knee. GI:  Soft, Non distended, Non tender.  +Bowel sounds  Neurologic:  Confused, moves all 4 limbs. Psych:   Not anxious nor agitated  Skin:  No rashes. No jaundice; multiple tattoos    Reviewed most current lab test results and cultures  YES  Reviewed most current radiology test results   YES  Review and summation of old records today    NO  Reviewed patient's current orders and MAR    YES  PMH/ reviewed - no change compared to H&P  ________________________________________________________________________  Care Plan discussed with:    Comments   Patient     Family  x Son on phone   RN x    Care Manager     Consultant                        Multidiciplinary team rounds were held today with , nursing, pharmacist and clinical coordinator. Patient's plan of care was discussed; medications were reviewed and discharge planning was addressed.      ________________________________________________________________________  Total NON critical care TIME:  20  Minutes    Total CRITICAL CARE TIME Spent:   Minutes non procedure based      Comments   >50% of visit spent in counseling and coordination of care     ________________________________________________________________________  Melissa Anglin MD     Procedures: see electronic medical records for all procedures/Xrays and details which were not copied into this note but were reviewed prior to creation of Plan. LABS:  I reviewed today's most current labs and imaging studies.   Pertinent labs include:  Recent Labs      11/21/17   1946   WBC  12.0*   HGB  12.2   HCT  34.5*   PLT  438*     Recent Labs      11/22/17 0355 11/21/17 2023   NA  133*  132*   K  3.8  3.8   CL  103  100   CO2  18*  22   GLU  127*  107*   BUN  25*  25*   CREA  1.44*  1.71*   CA  8.0*  8.4*   ALB   --   3.1*   TBILI   --   0.8   SGOT   --   16   ALT   --   15       Signed: Monica Luevano MD

## 2017-11-23 NOTE — ROUTINE PROCESS
Pt alert to self. Calm and cooperative. Reoriented to surroundings without success. Pt reports visual hallucinations. BLCTA. Tolerating po well. Continent of urine and bowels. Requires assistance with walker limited weight to LLE.  1:1 sitter  bedside

## 2017-11-23 NOTE — ROUTINE PROCESS
Bedside shift change report given to ANDREA Hung (oncoming nurse) by Tara Urbina (offgoing nurse). Report included the following information SBAR, Kardex and MAR.

## 2017-11-23 NOTE — PROGRESS NOTES
Bedside and Verbal shift change report given to Sulema Javed RN (oncoming nurse). Report included the following information SBAR, Kardex, ED Summary, Procedure Summary, MAR and Recent Results.

## 2017-11-23 NOTE — PROGRESS NOTES
General Surgery End of Shift Nursing Note    Bedside shift change report given to Olga Haas RN (oncoming nurse) by Han Saez RN (offgoing nurse). Report included the following information SBAR, Kardex, Procedure Summary, Intake/Output, MAR and Recent Results. Shift worked:   7p-7a   Summary of shift:    Pt observed resting with covers over head on rounds before 0400, pt up to BR twice to void, used walker to ambulated   Issues for physician to address:   PRN antipsychotic for agitation needed     Number times ambulated in hallway past shift: 0    Number of times OOB to chair past shift: 0    Pain Management:  Current medication:Tylenol    Patient states pain is manageable on current pain medication: NO  None given    GI:    Current diet:  DIET GI LITE (POST SURGICAL)    Tolerating current diet: YES  Passing flatus: YES  Last Bowel Movement:      Appearance:   Respiratory:    Incentive Spirometer at bedside: NO  Patient instructed on use: NO    Patient Safety:    Falls Score: 4  Bed Alarm On? No  Sitter?  Yes    Vidal Howell RN

## 2017-11-24 LAB
ANION GAP SERPL CALC-SCNC: 11 MMOL/L (ref 5–15)
BASOPHILS # BLD: 0 K/UL (ref 0–0.1)
BASOPHILS NFR BLD: 0 % (ref 0–1)
BUN SERPL-MCNC: 48 MG/DL (ref 6–20)
BUN/CREAT SERPL: 22 (ref 12–20)
CALCIUM SERPL-MCNC: 8.6 MG/DL (ref 8.5–10.1)
CHLORIDE SERPL-SCNC: 103 MMOL/L (ref 97–108)
CO2 SERPL-SCNC: 20 MMOL/L (ref 21–32)
CREAT SERPL-MCNC: 2.19 MG/DL (ref 0.7–1.3)
EOSINOPHIL # BLD: 0 K/UL (ref 0–0.4)
EOSINOPHIL NFR BLD: 0 % (ref 0–7)
ERYTHROCYTE [DISTWIDTH] IN BLOOD BY AUTOMATED COUNT: 13.4 % (ref 11.5–14.5)
GLUCOSE SERPL-MCNC: 93 MG/DL (ref 65–100)
HCT VFR BLD AUTO: 32.3 % (ref 36.6–50.3)
HGB BLD-MCNC: 11.4 G/DL (ref 12.1–17)
LYMPHOCYTES # BLD: 1 K/UL (ref 0.8–3.5)
LYMPHOCYTES NFR BLD: 10 % (ref 12–49)
MCH RBC QN AUTO: 34.2 PG (ref 26–34)
MCHC RBC AUTO-ENTMCNC: 35.3 G/DL (ref 30–36.5)
MCV RBC AUTO: 97 FL (ref 80–99)
MONOCYTES # BLD: 0.8 K/UL (ref 0–1)
MONOCYTES NFR BLD: 8 % (ref 5–13)
NEUTS SEG # BLD: 8.3 K/UL (ref 1.8–8)
NEUTS SEG NFR BLD: 82 % (ref 32–75)
PLATELET # BLD AUTO: 335 K/UL (ref 150–400)
POTASSIUM SERPL-SCNC: 3.7 MMOL/L (ref 3.5–5.1)
RBC # BLD AUTO: 3.33 M/UL (ref 4.1–5.7)
SODIUM SERPL-SCNC: 134 MMOL/L (ref 136–145)
WBC # BLD AUTO: 10.1 K/UL (ref 4.1–11.1)

## 2017-11-24 PROCEDURE — 97116 GAIT TRAINING THERAPY: CPT

## 2017-11-24 PROCEDURE — 80048 BASIC METABOLIC PNL TOTAL CA: CPT

## 2017-11-24 PROCEDURE — 74011250636 HC RX REV CODE- 250/636: Performed by: INTERNAL MEDICINE

## 2017-11-24 PROCEDURE — 65270000029 HC RM PRIVATE

## 2017-11-24 PROCEDURE — 74011250637 HC RX REV CODE- 250/637: Performed by: INTERNAL MEDICINE

## 2017-11-24 PROCEDURE — 99218 HC RM OBSERVATION: CPT

## 2017-11-24 PROCEDURE — 85025 COMPLETE CBC W/AUTO DIFF WBC: CPT

## 2017-11-24 PROCEDURE — 36415 COLL VENOUS BLD VENIPUNCTURE: CPT

## 2017-11-24 RX ORDER — SODIUM CHLORIDE 9 MG/ML
100 INJECTION, SOLUTION INTRAVENOUS CONTINUOUS
Status: DISPENSED | OUTPATIENT
Start: 2017-11-24 | End: 2017-11-24

## 2017-11-24 RX ORDER — FAMOTIDINE 20 MG/1
20 TABLET, FILM COATED ORAL DAILY
Status: DISCONTINUED | OUTPATIENT
Start: 2017-11-24 | End: 2017-11-28 | Stop reason: HOSPADM

## 2017-11-24 RX ADMIN — TAMSULOSIN HYDROCHLORIDE 0.4 MG: 0.4 CAPSULE ORAL at 09:31

## 2017-11-24 RX ADMIN — SODIUM CHLORIDE 100 ML/HR: 900 INJECTION, SOLUTION INTRAVENOUS at 09:40

## 2017-11-24 RX ADMIN — METOPROLOL SUCCINATE 50 MG: 50 TABLET, EXTENDED RELEASE ORAL at 09:31

## 2017-11-24 RX ADMIN — HEPARIN SODIUM 5000 UNITS: 5000 INJECTION, SOLUTION INTRAVENOUS; SUBCUTANEOUS at 06:43

## 2017-11-24 RX ADMIN — DOXAZOSIN 4 MG: 2 TABLET ORAL at 21:19

## 2017-11-24 RX ADMIN — HEPARIN SODIUM 5000 UNITS: 5000 INJECTION, SOLUTION INTRAVENOUS; SUBCUTANEOUS at 17:16

## 2017-11-24 RX ADMIN — AMLODIPINE BESYLATE 5 MG: 5 TABLET ORAL at 09:31

## 2017-11-24 RX ADMIN — FAMOTIDINE 20 MG: 20 TABLET ORAL at 09:31

## 2017-11-24 NOTE — PROGRESS NOTES
No call back from hospitalist, pt now sleeping, will address hallucinations in am if no agitation tonight

## 2017-11-24 NOTE — PROGRESS NOTES
General Surgery End of Shift Nursing Note    Bedside shift change report given to [id number] (oncoming nurse) by Luis Fernando Meadows (offgoing nurse). Report included the following information SBAR, Kardex, Procedure Summary, Intake/Output, MAR and Recent Results. Shift worked:   7a-7p   Summary of shift:   await urine for U/A   Issues for physician to address:   ED notes give no indication of the degree of confusion pt is experiencing since admission - ortho note references that pt is [place] very active - bowling. \" . Pt does not provide meaningful responses to questions. Oriented to self. Pain Management:  Current medication:Tylenol    Patient states pain is manageable on current pain medication: NO  None given    GI:    Current diet:  DIET GI LITE (POST SURGICAL)    Tolerating current diet: YES  Passing flatus: YES  Last Bowel Movement:      Appearance:   Respiratory:    Incentive Spirometer at bedside: NO  Patient instructed on use: NO    Patient Safety:    Falls Score: 4  Bed Alarm On? No  Sitter?  Yes    Trino Yoo

## 2017-11-24 NOTE — PROGRESS NOTES
Famotidine Dose Adjustment:    Famotidine dose adjusted to 20 mg daily per protocol for CrCl <50 ml/min    Thank you,  Monica Benson

## 2017-11-24 NOTE — PROGRESS NOTES
Problem: Mobility Impaired (Adult and Pediatric)  Goal: *Acute Goals and Plan of Care (Insert Text)  Physical Therapy Goals  Initiated 11/22/2017  1. Patient will move from supine to sit and sit to supine , scoot up and down and roll side to side in bed with supervision/set-up within 7 day(s). 2.  Patient will transfer from bed to chair and chair to bed with supervision/set-up using the least restrictive device within 7 day(s). 3.  Patient will perform sit to stand with supervision/set-up within 7 day(s). 4.  Patient will ambulate with supervision/set-up for 100 feet with the least restrictive device within 7 day(s). 5.  Patient will ascend/descend 12 stairs with 1 handrail(s) with supervision/set-up within 7 day(s). physical Therapy TREATMENT  Patient: Thu Sánchez (80 y.o. male)  Date: 11/24/2017  Diagnosis: Inability to walk <principal problem not specified>       Precautions:    Chart, physical therapy assessment, plan of care and goals were reviewed. ASSESSMENT:  Pt cleared by nurse to mobilize. Pt received  in bed supine with sitter in room. Pt agreeable to walking. Pt reported minimal pain just that he can feel it when walking. Pt performed supine to sit transfer at supervision. Pt performed sit to stand transfer at [facility]. Pt ambulated 275ft with RW at [facility]. Pt requiring minimal cueing to walker closer to walker for safety. Pt able to correct. Pt with improved mobility today. Pt will benefit from [facility] PT with 24hr supervision due to cognitive deficits  to improve strength and endurance. Progression toward goals:  [x]      Improving appropriately and progressing toward goals  []      Improving slowly and progressing toward goals  []      Not making progress toward goals and plan of care will be adjusted     PLAN:  Patient continues to benefit from skilled intervention to address the above impairments. Continue treatment per established plan of care.   Discharge Recommendations:  Home Health with 24/7 supervision  Further Equipment Recommendations for Discharge:  rolling walker     SUBJECTIVE:   Patient stated [facility] lets get moving.     OBJECTIVE DATA SUMMARY:   Critical Behavior:  Neurologic State: Alert  Orientation Level: Disoriented to place, Disoriented to situation, Disoriented to time, Oriented to person        Functional Mobility Training:  Bed Mobility:     Supine to Sit: Supervision     Scooting: Supervision         Transfers:  Sit to Stand: Contact guard assistance  Stand to Sit: Stand-by asssistance                             Balance:  Sitting: Intact  Standing: Intact  Standing - Static: Good  Standing - Dynamic : Good  Ambulation/Gait Training:  Distance (ft): 275 Feet (ft)  Assistive Device: Gait belt;Walker, rolling  Ambulation - Level of Assistance: Contact guard assistance        Gait Abnormalities: Decreased step clearance        Base of Support: Widened     Speed/Darlene: Pace decreased (<100 feet/min)  Step Length: Right shortened;Left shortened                  Pain:  Pain Scale 1: Numeric (0 - 10)  Pain Intensity 1: 0              Activity Tolerance:   Pt with improved mobility today.     After treatment: Sitter in roomt  [x] Patient left in no apparent distress sitting up in chair  [] Patient left in no apparent distress in bed  [x] Call bell left within reach  [x] Nursing notified  [] Caregiver present  [x] Bed alarm activated    COMMUNICATION/COLLABORATION:   The patients plan of care was discussed with: Registered Nurse    Tristen Koch   Time Calculation: 11 mins

## 2017-11-24 NOTE — PROGRESS NOTES
General Surgery End of Shift Nursing Note    Bedside shift change report given to [address] [place] [address] 20 (oncoming nurse) by Sita Deal RN (offgoing nurse). Report included the following information SBAR, Kardex, Procedure Summary, Intake/Output, MAR and Recent Results. Shift worked:   7p-7a   Summary of shift:    Pt responding to hallucinations and talking about what he is seeing, calm, cooperative with staff, urine sent to lab, CBC and BMP sent to lab   Issues for physician to address:   Pt hallucinating, creatinine 2.19, Lasix held per Dr. Javier Solitario. Number times ambulated in hallway past shift: 0    Number of times OOB to chair past shift: 0    Pain Management:  Current medication: none requested, Tylenol ordered  Patient states pain is manageable on current pain medication: YES    GI:    Current diet:  DIET GI LITE (POST SURGICAL)    Tolerating current diet: YES  Passing flatus: YES  Last Bowel Movement: several days ago   Appearance:     Respiratory:    Incentive Spirometer at bedside: NO  Patient instructed on use: NO    Patient Safety:    Falls Score: 4  Bed Alarm On? Yes   Sitter?  Yes    Hang Moore RN

## 2017-11-24 NOTE — PROGRESS NOTES
Hospitalist Progress Note    NAME: Venessa Tran. 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 No role for prednisone taper  -not safe to use indocin given renal failure  Left knee pain. .due to above. CKD-3b. ..creatininine increased. Hyponatremia. ..stable. HTN. ..poorly controlled; not malignant htn. CAD  Hypercholesterolemia  Confusion. ..unknown cause. Urine okay. Not sure of baseline mental status. PT/OT  Ortho consult appreciated. Colchicine stopped due to renal disease. Cont antihypertensive meds  Add norvasc  Will need follow up w/ pcp and ortho. Will need to determine further med treatment for his gout  Case management to be involved in dc planning. Will give 1 liter ivfluids. Recheck bmp in am.    81 yo wm w/ pmhx cad, htn, gout, ckd, and elevated cholesterol who presented to ED for left knee pain for several days. No trauma, falls, fevers, redness. He was unable to bear weight with left leg. Left venous doppler neg. Left knee arthrocentesis done; 120 cc straw colored fluid removed. Ortho consulted. Admitted for observation. Patient had received ivfluids on admission. He had mild hyponatremia which was better. IV fluids stopped. PT/OT consulted. He developed confusion. Urinalysis neg for uti. Creatinine increased. Patient rehydrated with ivfluids. Body mass index is 24.41 kg/(m^2). Code status: Full  Prophylaxis: Hep SQ  Recommended Disposition: Home w/Family     Subjective:     Chief Complaint / Reason for Physician Visit  \"I feel good\". No cp/sob. D/w nurse. Patient has been oriented to himself. Still w/ confusion. More alert. No problems overnight.   Currently eating breakfast.     Review of Systems:  Symptom Y/N Comments  Symptom Y/N Comments   Fever/Chills N Chest Pain N    Poor Appetite N   Edema N    Cough N   Abdominal Pain N    Sputum N   Joint Pain N    SOB/URENA N   Pruritis/Rash N    Nausea/vomit N   Tolerating PT/OT     Diarrhea N   Tolerating Diet Y    Constipation N   Other       Could NOT obtain due to: confusion     Objective:     VITALS:   Last 24hrs VS reviewed since prior progress note. Most recent are:  Patient Vitals for the past 24 hrs:   Temp Pulse Resp BP SpO2   11/24/17 0755 97.4 °F (36.3 °C) 72 18 128/71 98 %   11/24/17 0229 97.5 °F (36.4 °C) 71 16 114/65 95 %   11/24/17 0001 97.7 °F (36.5 °C) (!) 52 14 103/53 100 %   11/23/17 1947 97.8 °F (36.6 °C) 60 16 102/70 98 %   11/23/17 1452 97.4 °F (36.3 °C) 66 14 93/64 100 %   11/23/17 1106 97.3 °F (36.3 °C) 64 14 (!) 143/93 100 %       Intake/Output Summary (Last 24 hours) at 11/24/17 1001  Last data filed at 11/23/17 2043   Gross per 24 hour   Intake              440 ml   Output              280 ml   Net              160 ml        PHYSICAL EXAM:  General: WD, WN. Confused, no acute distress    EENT:  EOMI. Anicteric sclerae. MMM  Resp:  CTA bilaterally, no wheezing or rales. No accessory muscle use  CV:  Regular  Rhythm, decreased  Left leg swelling; No swelling right leg; no tendernes left knee. GI:  Soft, Non distended, Non tender.  +Bowel sounds  Neurologic:  More alert; speech clear; confused, moves all 4 limbs. Psych:   Not anxious nor agitated  Skin:  No rashes.   No jaundice; multiple tattoos    Reviewed most current lab test results and cultures  YES  Reviewed most current radiology test results   YES  Review and summation of old records today    NO  Reviewed patient's current orders and MAR    YES  PMH/SH reviewed - no change compared to H&P  ________________________________________________________________________  Care Plan discussed with:    Comments   Patient x    Family      RN x    Care Manager x    Consultant                        Multidiciplinary team rounds were held today with case manager, nursing, pharmacist and clinical coordinator. Patient's plan of care was discussed; medications were reviewed and discharge planning was addressed. ________________________________________________________________________  Total NON critical care TIME:  20  Minutes    Total CRITICAL CARE TIME Spent:   Minutes non procedure based      Comments   >50% of visit spent in counseling and coordination of care     ________________________________________________________________________  Burgess Socrates MD     Procedures: see electronic medical records for all procedures/Xrays and details which were not copied into this note but were reviewed prior to creation of Plan. LABS:  I reviewed today's most current labs and imaging studies.   Pertinent labs include:  Recent Labs      11/24/17 0243 11/21/17   1946   WBC  10.1  12.0*   HGB  11.4*  12.2   HCT  32.3*  34.5*   PLT  335  438*     Recent Labs      11/24/17 0243 11/22/17 0355  11/21/17 2023   NA  134*  133*  132*   K  3.7  3.8  3.8   CL  103  103  100   CO2  20*  18*  22   GLU  93  127*  107*   BUN  48*  25*  25*   CREA  2.19*  1.44*  1.71*   CA  8.6  8.0*  8.4*   ALB   --    --   3.1*   TBILI   --    --   0.8   SGOT   --    --   16   ALT   --    --   15       Signed: Burgess Socrates MD

## 2017-11-24 NOTE — PROGRESS NOTES
Pt is clearly hallucinating because he is pointing and talking with sitter @ bedside about what he see. Hospitalist paged.

## 2017-11-25 ENCOUNTER — APPOINTMENT (OUTPATIENT)
Dept: CT IMAGING | Age: 82
DRG: 554 | End: 2017-11-25
Attending: INTERNAL MEDICINE
Payer: MEDICARE

## 2017-11-25 LAB
ANION GAP SERPL CALC-SCNC: 8 MMOL/L (ref 5–15)
BUN SERPL-MCNC: 46 MG/DL (ref 6–20)
BUN/CREAT SERPL: 25 (ref 12–20)
CALCIUM SERPL-MCNC: 8.6 MG/DL (ref 8.5–10.1)
CHLORIDE SERPL-SCNC: 107 MMOL/L (ref 97–108)
CO2 SERPL-SCNC: 23 MMOL/L (ref 21–32)
CREAT SERPL-MCNC: 1.85 MG/DL (ref 0.7–1.3)
GLUCOSE SERPL-MCNC: 80 MG/DL (ref 65–100)
POTASSIUM SERPL-SCNC: 3.9 MMOL/L (ref 3.5–5.1)
SODIUM SERPL-SCNC: 138 MMOL/L (ref 136–145)
VIT B12 SERPL-MCNC: 1178 PG/ML (ref 211–911)

## 2017-11-25 PROCEDURE — 80048 BASIC METABOLIC PNL TOTAL CA: CPT

## 2017-11-25 PROCEDURE — 74011250637 HC RX REV CODE- 250/637: Performed by: INTERNAL MEDICINE

## 2017-11-25 PROCEDURE — 74011250636 HC RX REV CODE- 250/636: Performed by: INTERNAL MEDICINE

## 2017-11-25 PROCEDURE — 82607 VITAMIN B-12: CPT

## 2017-11-25 PROCEDURE — 36415 COLL VENOUS BLD VENIPUNCTURE: CPT

## 2017-11-25 PROCEDURE — 65270000029 HC RM PRIVATE

## 2017-11-25 PROCEDURE — 70450 CT HEAD/BRAIN W/O DYE: CPT

## 2017-11-25 RX ORDER — MEMANTINE HYDROCHLORIDE 10 MG/1
5 TABLET ORAL
Status: DISCONTINUED | OUTPATIENT
Start: 2017-12-10 | End: 2017-11-28 | Stop reason: HOSPADM

## 2017-11-25 RX ORDER — MEMANTINE HYDROCHLORIDE 10 MG/1
10 TABLET ORAL DAILY
Status: DISCONTINUED | OUTPATIENT
Start: 2017-12-10 | End: 2017-11-28 | Stop reason: HOSPADM

## 2017-11-25 RX ORDER — MEMANTINE HYDROCHLORIDE 10 MG/1
5 TABLET ORAL DAILY
Status: DISCONTINUED | OUTPATIENT
Start: 2017-11-26 | End: 2017-11-28 | Stop reason: HOSPADM

## 2017-11-25 RX ORDER — MEMANTINE HYDROCHLORIDE 10 MG/1
10 TABLET ORAL EVERY 12 HOURS
Status: DISCONTINUED | OUTPATIENT
Start: 2017-12-17 | End: 2017-11-28 | Stop reason: HOSPADM

## 2017-11-25 RX ORDER — MEMANTINE HYDROCHLORIDE 10 MG/1
5 TABLET ORAL EVERY 12 HOURS
Status: DISCONTINUED | OUTPATIENT
Start: 2017-12-03 | End: 2017-11-28 | Stop reason: HOSPADM

## 2017-11-25 RX ADMIN — HEPARIN SODIUM 5000 UNITS: 5000 INJECTION, SOLUTION INTRAVENOUS; SUBCUTANEOUS at 17:07

## 2017-11-25 RX ADMIN — DOXAZOSIN 4 MG: 2 TABLET ORAL at 22:01

## 2017-11-25 RX ADMIN — HEPARIN SODIUM 5000 UNITS: 5000 INJECTION, SOLUTION INTRAVENOUS; SUBCUTANEOUS at 05:03

## 2017-11-25 RX ADMIN — AMLODIPINE BESYLATE 5 MG: 5 TABLET ORAL at 08:48

## 2017-11-25 RX ADMIN — METOPROLOL SUCCINATE 50 MG: 50 TABLET, EXTENDED RELEASE ORAL at 08:48

## 2017-11-25 RX ADMIN — TAMSULOSIN HYDROCHLORIDE 0.4 MG: 0.4 CAPSULE ORAL at 08:48

## 2017-11-25 RX ADMIN — FAMOTIDINE 20 MG: 20 TABLET ORAL at 08:48

## 2017-11-25 NOTE — ROUTINE PROCESS
General Surgery End of Shift Nursing Note    Bedside shift change report given to Mirella RN (oncoming nurse) by Melissa Mejia RN (offgoing nurse). Report included the following information SBAR. Shift worked:   7a-7p   Summary of shift:    Ct scan completed today. Issues for physician to address:        Number times ambulated in hallway past shift: 0    Number of times OOB to chair past shift: 0    Pain Management:  Current medication:   Patient states pain is manageable on current pain medication:     GI:    Current diet:  DIET GI LITE (POST SURGICAL)    Tolerating current diet: YES  Passing flatus: YES  Last Bowel Movement: yesterday   Appearance:     Respiratory:    Incentive Spirometer at bedside: YES  Patient instructed on use: YES    Patient Safety:    Falls Score: 1  Bed Alarm On? Yes  Sitter?  Yes    Mercedes Man RN

## 2017-11-25 NOTE — CONSULTS
NEUROLOGY NOTE     DATE OF CONSULTATION: 11/25/2017    CONSULTED BY: Jose Urbina MD    Chief Complaint   Patient presents with    Knee Pain     Presents to the ED [facility] with c/o non-traumatic LLE swelling and Lt knee pain x several days. Reason for Consult  I have been asked to see the patient in neurological consultation to render advice and opinion regarding confusion    [address]. is a 80 y.o. male who presents to the hospital because of left knee pain. Neurology was consulted because of confusion. The history was obtained from the friend's daughter. She states that the patient's children are not close to him. She knows him for the last 10 years and states that over the last 1-2 years he has been having gradually progressive memory loss. In the last 1-2 months he has also been having some problems with driving and recognizing other people. She is unsure if he is able to manage his finances are not. It is hard for the patient to remember conversation. No hallucinations.     ROS  A ten system review of constitutional, cardiovascular, respiratory, musculoskeletal, endocrine, skin, SHEENT, genitourinary, psychiatric and neurologic systems was obtained and is unremarkable except as stated in HPI     PMH  Past Medical History:   Diagnosis Date    Abdominal aortic aneurysm ([address]     Arthritis     CAD (coronary artery disease)     bypass    Hypertension     Other ill-defined conditions(799.89)     gout    Other ill-defined conditions(799.89)     high cholesterol       FH  Family History   Problem Relation Age of Onset    Cancer Mother      liver    Hypertension Father     No Known Problems Sister     No Known Problems Brother     No Known Problems Sister      brain aneurysym       SH  Social History     Social History    Marital status: SINGLE     Spouse name: N/A    Number of children: N/A    Years of education: N/A     Social History Main Topics    Smoking status: Former Smoker    Smokeless tobacco: Never Used    Alcohol use No    Drug use: No    Sexual activity: Not Asked     Other Topics Concern    None     Social History Narrative       ALLERGIES  No Known Allergies    PHYSICAL EXAM  EXAMINATION:   Patient Vitals for the past 24 hrs:   Temp Pulse Resp BP SpO2   11/25/17 1518 97.8 °F (36.6 °C) (!) 52 16 115/68 100 %   11/25/17 1304 97.5 °F (36.4 °C) (!) 55 16 102/69 92 %   11/25/17 0845 97.4 °F (36.3 °C) (!) 56 18 120/70 97 %   11/25/17 0258 97.3 °F (36.3 °C) (!) 59 16 110/62 95 %   11/24/17 2234 97.6 °F (36.4 °C) 62 16 117/52 98 %   11/24/17 2104 97.3 °F (36.3 °C) (!) 53 16 111/56 99 %   11/24/17 1649 97.6 °F (36.4 °C) (!) 57 16 111/63 98 %        General:   General appearance: Pt is in no acute distress   Distal pulses are preserved  Fundoscopic exam: attempted    Neurological Examination:   Mental Status:  AAO x2. Speech is fluent. Follows commands, has poor fund of knowledge, attention, short term recall, comprehension and insight. Cranial Nerves: Visual fields are full. PERRL, Extraocular movements are full. Facial sensation intact V1- V3. Facial movement intact, symmetric. Hearing intact to conversation. Palate elevates symmetrically. Shoulder shrug symmetric. Tongue midline. Motor: Strength is 5/5 in all 4 ext. Normal tone. No atrophy. Sensation: Normal to light touch    Reflexes: DTRs 2+ throughout. Plantar responses downgoing. Coordination/Cerebellar: Intact to finger-nose-finger     Gait: Deferred    Skin: No significant bruising or lacerations.     Mini Mental State Exam 11/25/2017   What is the Year 0   What is the Season 0   What is the Date 0   What is the Day 0   What is the Month 1   Where are we State 0   Where are we Country 1   Where are we [place] [facility] 1   [place] are we Floor 0   Name three objects, then ask the patient to say them 2   Serial sevens Subtract 7 from 100 in increments 1   Ask for the three objects repeated above 0   Name a pencil 1   Name a watch 1   Have the patient repeat this phrase \"No ifs, ands, or buts\" 1     LAB DATA REVIEWED:    Recent Results (from the past 24 hour(s))   METABOLIC PANEL, BASIC    Collection Time: 11/25/17  5:10 AM   Result Value Ref Range    Sodium 138 136 - 145 mmol/L    Potassium 3.9 3.5 - 5.1 mmol/L    Chloride 107 97 - 108 mmol/L    CO2 23 21 - 32 mmol/L    Anion gap 8 5 - 15 mmol/L    Glucose 80 65 - 100 mg/dL    BUN 46 (H) 6 - 20 MG/DL    Creatinine 1.85 (H) 0.70 - 1.30 MG/DL    BUN/Creatinine ratio 25 (H) 12 - 20      GFR est AA 42 (L) >60 ml/min/1.73m2    GFR est non-AA 35 (L) >60 ml/min/1.73m2    Calcium 8.6 8.5 - 10.1 MG/DL   VITAMIN B12    Collection Time: 11/25/17  5:10 AM   Result Value Ref Range    Vitamin B12 1178 (H) 211 - 911 pg/mL        Imaging review:  CT head  Normal    HOME MEDS  Prior to Admission Medications   Prescriptions Last Dose Informant Patient Reported? Taking? HYDROcodone-acetaminophen (NORCO) 5-325 mg per tablet   No No   Sig: Take 2 tablets by mouth every four (4) hours as needed. atropine-PHENobarbital-scopolamine-hyoscyamine (DONNATAL) 16.2-0.1037 -0.0194 mg per tablet   No No   Sig: Take 1 Tab by mouth every six (6) hours as needed for Pain. Max Daily Amount: 4 Tabs. colchicine 0.6 mg tablet   Yes No   Sig: Take 0.6 mg by mouth daily as needed. Indications: GOUT   doxazosin (CARDURA) 4 mg tablet   Yes No   Sig: Take 4 mg by mouth nightly. Indications: HYPERTENSION   famotidine (PEPCID) 20 mg tablet   No No   Sig: Take 1 Tab by mouth two (2) times a day. furosemide (LASIX) 20 mg tablet   Yes No   Sig: Take 20 mg by mouth every other day. Indications: EDEMA   metoprolol-XL (TOPROL-XL) 50 mg XL tablet   No No   Sig: Take 1 Tab by mouth daily. tamsulosin (FLOMAX) 0.4 mg capsule   No No   Sig: Take 1 capsule by mouth daily.       Facility-Administered Medications: None       CURRENT MEDS  Current Facility-Administered Medications   Medication Dose Route Frequency    famotidine (PEPCID) tablet 20 mg  20 mg Oral DAILY    doxazosin (CARDURA) tablet 4 mg  4 mg Oral QHS    furosemide (LASIX) tablet 20 mg  20 mg Oral EVERY OTHER DAY    metoprolol succinate (TOPROL-XL) XL tablet 50 mg  50 mg Oral DAILY    tamsulosin (FLOMAX) capsule 0.4 mg  0.4 mg Oral DAILY    heparin (porcine) injection 5,000 Units  5,000 Units SubCUTAneous Q12H    amLODIPine (NORVASC) tablet 5 mg  5 mg Oral DAILY       IMPRESSION/recommendation:  Miriam Calderon is a 80 y.o. male who presents with left knee pain. Neurology consulted for confusion. According to the history obtained the patient has been having gradually progressive memory loss for the last 1-2 years and now sometimes he gets lost while driving and has problems remembering conversation and with names as well. I do suspect that he has dementia? Probably Alzheimer's type. Acetylcholine esterase inhibitors such as Aricept will not be contrast because of bradycardia. I am going to start the patient on Namenda 5 mg daily for 1 week, 5 mg p.o. twice daily for 1 week, 10 mg in a.m. and 5 mg at night for 1 week and then 10 mg p.o. twice daily. The patient can follow with me as an outpatient. Thank you very much for this consultation. We will follow up on the above studies and give further recommendations as indicated.       Janell Orourke MD  Neurologist

## 2017-11-25 NOTE — PROGRESS NOTES
General Surgery End of Shift Nursing Note    Bedside shift change report given to [facility] (oncoming nurse) by José Luis Quintero (offgoing nurse). Report included the following information SBAR, Accordion and Recent Results. Shift worked:   7p-7a   Summary of shift:    Patient remains confused, alert and oriented to self only, ambulates with unsteady gait to BR, no complaints of pain, sitter at bedside, impulsive   Issues for physician to address:   Labs improved (sodium, kidney function), loose stool     Number times ambulated in hallway past shift: 0, ambulated to BR several times    Number of times OOB to chair past shift: 0    Pain Management:  Current medication: n/a  Patient states pain is manageable on current pain medication: YES    GI:    Current diet:  DIET GI LITE (POST SURGICAL)    Tolerating current diet: YES  Passing flatus: YES  Last Bowel Movement: today   Appearance: loose      Patient Safety:    Falls Score: 3  Bed Alarm On? Yes  Sitter?  Yes    Ayde Ronquillo

## 2017-11-26 LAB
AMMONIA PLAS-SCNC: 22 UMOL/L
ANION GAP SERPL CALC-SCNC: 8 MMOL/L (ref 5–15)
BUN SERPL-MCNC: 38 MG/DL (ref 6–20)
BUN/CREAT SERPL: 23 (ref 12–20)
CALCIUM SERPL-MCNC: 8.2 MG/DL (ref 8.5–10.1)
CHLORIDE SERPL-SCNC: 111 MMOL/L (ref 97–108)
CO2 SERPL-SCNC: 22 MMOL/L (ref 21–32)
CREAT SERPL-MCNC: 1.66 MG/DL (ref 0.7–1.3)
FOLATE SERPL-MCNC: 15.1 NG/ML (ref 5–21)
GLUCOSE SERPL-MCNC: 100 MG/DL (ref 65–100)
POTASSIUM SERPL-SCNC: 3.7 MMOL/L (ref 3.5–5.1)
SODIUM SERPL-SCNC: 141 MMOL/L (ref 136–145)

## 2017-11-26 PROCEDURE — 74011250637 HC RX REV CODE- 250/637: Performed by: PSYCHIATRY & NEUROLOGY

## 2017-11-26 PROCEDURE — 74011000258 HC RX REV CODE- 258: Performed by: INTERNAL MEDICINE

## 2017-11-26 PROCEDURE — 74011250637 HC RX REV CODE- 250/637: Performed by: INTERNAL MEDICINE

## 2017-11-26 PROCEDURE — 82746 ASSAY OF FOLIC ACID SERUM: CPT

## 2017-11-26 PROCEDURE — 74011250636 HC RX REV CODE- 250/636: Performed by: INTERNAL MEDICINE

## 2017-11-26 PROCEDURE — 65270000029 HC RM PRIVATE

## 2017-11-26 PROCEDURE — 36415 COLL VENOUS BLD VENIPUNCTURE: CPT

## 2017-11-26 PROCEDURE — 80048 BASIC METABOLIC PNL TOTAL CA: CPT

## 2017-11-26 PROCEDURE — 82140 ASSAY OF AMMONIA: CPT | Performed by: PSYCHIATRY & NEUROLOGY

## 2017-11-26 RX ORDER — HYDRALAZINE HYDROCHLORIDE 20 MG/ML
10 INJECTION INTRAMUSCULAR; INTRAVENOUS
Status: DISCONTINUED | OUTPATIENT
Start: 2017-11-26 | End: 2017-11-28 | Stop reason: HOSPADM

## 2017-11-26 RX ORDER — QUETIAPINE FUMARATE 25 MG/1
12.5 TABLET, FILM COATED ORAL
Status: DISCONTINUED | OUTPATIENT
Start: 2017-11-26 | End: 2017-11-28 | Stop reason: HOSPADM

## 2017-11-26 RX ORDER — ALLOPURINOL 100 MG/1
100 TABLET ORAL DAILY
Status: DISCONTINUED | OUTPATIENT
Start: 2017-11-26 | End: 2017-11-28 | Stop reason: HOSPADM

## 2017-11-26 RX ORDER — SODIUM CHLORIDE 450 MG/100ML
50 INJECTION, SOLUTION INTRAVENOUS CONTINUOUS
Status: DISCONTINUED | OUTPATIENT
Start: 2017-11-26 | End: 2017-11-28 | Stop reason: HOSPADM

## 2017-11-26 RX ADMIN — SODIUM CHLORIDE 50 ML/HR: 450 INJECTION, SOLUTION INTRAVENOUS at 11:21

## 2017-11-26 RX ADMIN — DOXAZOSIN 4 MG: 2 TABLET ORAL at 22:58

## 2017-11-26 RX ADMIN — ALLOPURINOL 100 MG: 100 TABLET ORAL at 11:20

## 2017-11-26 RX ADMIN — MEMANTINE 5 MG: 10 TABLET ORAL at 08:03

## 2017-11-26 RX ADMIN — HEPARIN SODIUM 5000 UNITS: 5000 INJECTION, SOLUTION INTRAVENOUS; SUBCUTANEOUS at 05:18

## 2017-11-26 RX ADMIN — QUETIAPINE FUMARATE 12.5 MG: 25 TABLET ORAL at 22:58

## 2017-11-26 RX ADMIN — AMLODIPINE BESYLATE 5 MG: 5 TABLET ORAL at 08:02

## 2017-11-26 RX ADMIN — HEPARIN SODIUM 5000 UNITS: 5000 INJECTION, SOLUTION INTRAVENOUS; SUBCUTANEOUS at 17:07

## 2017-11-26 RX ADMIN — SODIUM CHLORIDE 50 ML/HR: 450 INJECTION, SOLUTION INTRAVENOUS at 23:02

## 2017-11-26 RX ADMIN — TAMSULOSIN HYDROCHLORIDE 0.4 MG: 0.4 CAPSULE ORAL at 08:03

## 2017-11-26 RX ADMIN — FAMOTIDINE 20 MG: 20 TABLET ORAL at 08:02

## 2017-11-26 RX ADMIN — FUROSEMIDE 20 MG: 20 TABLET ORAL at 08:02

## 2017-11-26 RX ADMIN — METOPROLOL SUCCINATE 50 MG: 50 TABLET, EXTENDED RELEASE ORAL at 08:02

## 2017-11-26 NOTE — PROGRESS NOTES
CM provided family SNF list and advised them to review and provide CM with two to three choice for SNF placement tomorrow.      Hunter Mcfarlane, MSW, [address] [place] [address]   [phone]

## 2017-11-26 NOTE — PROGRESS NOTES
General Surgery End of Shift Nursing Note    Bedside shift change report given to Norah Recinos RN (oncoming nurse) by Igor Maradiaga (offgoing nurse). Report included the following information SBAR, Kardex, Intake/Output, MAR and Recent Results. Shift worked:   7p-7a   Summary of shift:         Issues for physician to address:          Number times ambulated in hallway past shift: 0    Number of times OOB to chair past shift: 0    Pain Management:  Current medication: 0  Patient states pain is manageable on current pain medication: YES    GI:    Current diet:  DIET GI LITE (POST SURGICAL)    Tolerating current diet: YES  Passing flatus: YES  Last Bowel Movement:    Appearance:     Respiratory:    Incentive Spirometer at bedside:NO   Patient instructed on use: NO    Patient Safety:    Falls Score: 4  Bed Alarm On? Yes  Sitter?  Yes    Byron Torres RN

## 2017-11-26 NOTE — ROUTINE PROCESS
General Surgery End of Shift Nursing Note    Bedside shift change report given to Lindsey Curry (oncoming nurse) by Cricket Machado RN (offgoing nurse). Report included the following information SBAR. Shift worked:   7a-7p   Summary of shift:    Sitter D/C, placed a telesitter instead. Possible discharge to SNF tomorrow. Issues for physician to address:        Number times ambulated in hallway past shift: 0    Number of times OOB to chair past shift: 0    Pain Management:  Current medication:   Patient states pain is manageable on current pain medication:     GI:    Current diet:  DIET GI LITE (POST SURGICAL)    Tolerating current diet: YES  Passing flatus: YES  Last Bowel Movement: today   Appearance:     Respiratory:    Incentive Spirometer at bedside: YES  Patient instructed on use: YES    Patient Safety:    Falls Score: 2  Bed Alarm On? Yes  Sitter?  No    Adolfo Douglas RN

## 2017-11-26 NOTE — PROGRESS NOTES
Hospitalist Progress Note    NAME: Nay Castelan. :  3/15/1933   MRN:  [id number]       Assessment / Plan:  Acute Gout: in left knee s/p arthrocentesis 120ml drained uric acid crystals noted, s/p Steroid injection, Ortho help appreciated. 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 Duplex is negative for DVT. Left knee pain. .due to above; improved. CKD-3b. ..follow renal function. Give gentle hydration and monitor. Hyponatremia: so far resolved, monitor. HTN: c/w Norvasc, metoprolol and lasix, so far better controlled, use hydralazine prn. CAD  Hypercholesterolemia  Confusion/Dementia: patient had hallucinations, CT head is negative, Neurology help appreciated, c/w Namenda, add low dose Seroquel at night  Body mass index is 24.41 kg/(m^2). Code status: Full  Prophylaxis: Hep SQ  Recommended Disposition: SNF    Patient will need SNF spoke with soni Sandoval  At [phone], plan for possible D/c tomorrow     Subjective:     Chief Complaint / Reason for Physician Visit  \"I feel good\". No cp/sob. Denies any pain in leg. D/w nurse. Patient has been alert but remains confused and disoriented. No problems overnight. Review of Systems:  Symptom Y/N Comments  Symptom Y/N Comments   Fever/Chills N   Chest Pain N    Poor Appetite N   Edema N    Cough N   Abdominal Pain N    Sputum N   Joint Pain N    SOB/URENA N   Pruritis/Rash N    Nausea/vomit N   Tolerating PT/OT Y    Diarrhea N   Tolerating Diet Y    Constipation N   Other       Could NOT obtain due to:      Objective:     VITALS:   Last 24hrs VS reviewed since prior progress note.  Most recent are:  Patient Vitals for the past 24 hrs:   Temp Pulse Resp BP SpO2   17 0753 97.9 °F (36.6 °C) (!) 101 16 131/76 96 %   17 0329 97.7 °F (36.5 °C) (!) 53 16 110/57 97 %   17 98.5 °F (36.9 °C) 65 16 98/52 97 %   17 - - - 113/66 -   17 97.9 °F (36.6 °C) 87 18 104/73 99 %   17 1518 97.8 °F (36.6 °C) (!) 52 16 115/68 100 %   11/25/17 1304 97.5 °F (36.4 °C) (!) 55 16 102/69 92 %       Intake/Output Summary (Last 24 hours) at 11/26/17 0933  Last data filed at 11/25/17 1744   Gross per 24 hour   Intake              480 ml   Output                0 ml   Net              480 ml        PHYSICAL EXAM:  General: WD, WN. Awake, cooperative, no acute distress    EENT:  EOMI. Anicteric sclerae. MMM  Resp:  Coarse BS  CV:  Regular  Rhythm, decreased  Left leg swelling; No swelling right leg; no tendernes left knee. GI:  Soft, Non distended, Non tender.  +Bowel sounds  Neurologic:  Alert but confused; does not know place. speech clear; moves all 4 limbs. Psych:   Not anxious nor agitated  Skin:  No rashes. No jaundice; multiple tattoos    Reviewed most current lab test results and cultures  YES  Reviewed most current radiology test results   YES  Review and summation of old records today    NO  Reviewed patient's current orders and MAR    YES  PMH/ reviewed - no change compared to H&P  ________________________________________________________________________  Care Plan discussed with:    Comments   Patient y    Family  y son   RN x    Care Manager     Consultant                        Multidiciplinary team rounds were held today with , nursing, pharmacist and clinical coordinator. Patient's plan of care was discussed; medications were reviewed and discharge planning was addressed. ________________________________________________________________________  Total NON critical care TIME: 25  Minutes    Total CRITICAL CARE TIME Spent:   Minutes non procedure based      Comments   >50% of visit spent in counseling and coordination of care y    ________________________________________________________________________  Leonidas Veloz MD     Procedures: see electronic medical records for all procedures/Xrays and details which were not copied into this note but were reviewed prior to creation of Plan.       LABS:  I reviewed today's most current labs and imaging studies. Pertinent labs include:  Recent Labs      11/24/17   0243   WBC  10.1   HGB  11.4*   HCT  32.3*   PLT  335     Recent Labs      11/26/17   0514  11/25/17   0510  11/24/17   0243   NA  141  138  134*   K  3.7  3.9  3.7   CL  111*  107  103   CO2  22  23  20*   GLU  100  80  93   BUN  38*  46*  48*   CREA  1.66*  1.85*  2.19*   CA  8.2*  8.6  8.6       Signed: Ebonie Campo MD        Met with son([place] - [phone]). Updated tests. B12 level not low. CT head neg. Will consult neurology. Son concerned about dementia. He remembered talking to his father on phone about a month ago in which it appeared tht father was confused. He was saying things that did not make any sense. Son will meet with  about arrange nursing home.

## 2017-11-27 PROCEDURE — 74011000258 HC RX REV CODE- 258: Performed by: INTERNAL MEDICINE

## 2017-11-27 PROCEDURE — 74011250637 HC RX REV CODE- 250/637: Performed by: INTERNAL MEDICINE

## 2017-11-27 PROCEDURE — 74011250636 HC RX REV CODE- 250/636: Performed by: INTERNAL MEDICINE

## 2017-11-27 PROCEDURE — 65270000029 HC RM PRIVATE

## 2017-11-27 PROCEDURE — 74011250637 HC RX REV CODE- 250/637: Performed by: PSYCHIATRY & NEUROLOGY

## 2017-11-27 RX ADMIN — SODIUM CHLORIDE 50 ML/HR: 450 INJECTION, SOLUTION INTRAVENOUS at 21:38

## 2017-11-27 RX ADMIN — DOXAZOSIN 4 MG: 2 TABLET ORAL at 21:38

## 2017-11-27 RX ADMIN — AMLODIPINE BESYLATE 5 MG: 5 TABLET ORAL at 09:33

## 2017-11-27 RX ADMIN — METOPROLOL SUCCINATE 50 MG: 50 TABLET, EXTENDED RELEASE ORAL at 09:33

## 2017-11-27 RX ADMIN — TAMSULOSIN HYDROCHLORIDE 0.4 MG: 0.4 CAPSULE ORAL at 09:33

## 2017-11-27 RX ADMIN — ALLOPURINOL 100 MG: 100 TABLET ORAL at 09:33

## 2017-11-27 RX ADMIN — MEMANTINE 5 MG: 10 TABLET ORAL at 09:33

## 2017-11-27 RX ADMIN — HEPARIN SODIUM 5000 UNITS: 5000 INJECTION, SOLUTION INTRAVENOUS; SUBCUTANEOUS at 18:00

## 2017-11-27 RX ADMIN — HEPARIN SODIUM 5000 UNITS: 5000 INJECTION, SOLUTION INTRAVENOUS; SUBCUTANEOUS at 06:45

## 2017-11-27 RX ADMIN — FAMOTIDINE 20 MG: 20 TABLET ORAL at 09:33

## 2017-11-27 RX ADMIN — QUETIAPINE FUMARATE 12.5 MG: 25 TABLET ORAL at 21:36

## 2017-11-27 NOTE — PROGRESS NOTES
Hospitalist Progress Note      NAME: Kristine Chun. 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 - allopurinol. Left knee pain. .due to above; Duplex is negative for DVT. improved. CKD-3b. .  .follow renal function. Give gentle hydration and monitor. Hyponatremia: so far resolved,   - monitor. HTN:  so far better controlled,   c/w Norvasc, metoprolol and lasix,use hydralazine prn. CAD  Hypercholesterolemia  Confusion/Dementia:   patient had hallucinations, CT head is negative,   - Neurology help appreciated,  - Namenda, add low dose Seroquel at night    Body mass index is 24.41 kg/(m^2). Code status: Full  Prophylaxis: Hep SQ  Recommended Disposition: SNF     Patient will need SNF spoke with son Ronna Choi  At [phone], plan for possible D/c soon to [facility]        Subjective:     Chief Complaint:  Gout        ROS:  (bold if positive, if negative)      Tolerating PT  Tolerating Diet          Objective:       Vitals:          Last 24hrs VS reviewed since prior progress note.  Most recent are:    Visit Vitals    /59    Pulse (!) 51    Temp 97.3 °F (36.3 °C)    Resp 16    Ht 6' (1.829 m)    Wt 81.6 kg (180 lb)    SpO2 99%    BMI 24.41 kg/m2     SpO2 Readings from Last 6 Encounters:   17 99%   16 99%   14 95%   10/31/14 98%   13 100%          Intake/Output Summary (Last 24 hours) at 17 1013  Last data filed at 17 2385   Gross per 24 hour   Intake          1864.16 ml   Output             1425 ml   Net           439.16 ml          Exam:     Physical Exam:    Gen:  Well-developed, well-nourished, in no acute distress  HEENT:  Pink conjunctivae, PERRL, hearing intact to voice, moist mucous membranes  Neck: Supple, without masses, thyroid non-tender  Resp:  No accessory muscle use, clear breath sounds without wheezes, rales or rhonchi  Card:  No murmurs, normal S1, S2 without thrills, bruits or peripheral edema  Abd:  Soft, non-tender, non-distended, normoactive bowel sounds are present  Musc:  No cyanosis, clubbing or edema  Skin:  No rashes or ulcers, skin turgor is good  Neuro:  Cranial nerves 3-12 are grossly intact, follows commands appropriately  Psych:  Good insight, oriented to person, place and time, alert       Telemetry reviewed:   normal sinus rhythm    Medications Reviewed: (see below)    Lab Data Reviewed: (see below)    ______________________________________________________________________    Medications:     Current Facility-Administered Medications   Medication Dose Route Frequency    hydrALAZINE (APRESOLINE) 20 mg/mL injection 10 mg  10 mg IntraVENous Q6H PRN    QUEtiapine (SEROquel) tablet 12.5 mg  12.5 mg Oral QHS    0.45% sodium chloride infusion  50 mL/hr IntraVENous CONTINUOUS    allopurinol (ZYLOPRIM) tablet 100 mg  100 mg Oral DAILY    memantine (NAMENDA) tablet 5 mg  5 mg Oral DAILY    [START ON 12/3/2017] memantine (NAMENDA) tablet 5 mg  5 mg Oral Q12H    [START ON 12/10/2017] memantine (NAMENDA) tablet 10 mg  10 mg Oral DAILY    And    [START ON 12/10/2017] memantine (NAMENDA) tablet 5 mg  5 mg Oral QHS    [START ON 12/17/2017] memantine (NAMENDA) tablet 10 mg  10 mg Oral Q12H    famotidine (PEPCID) tablet 20 mg  20 mg Oral DAILY    doxazosin (CARDURA) tablet 4 mg  4 mg Oral QHS    furosemide (LASIX) tablet 20 mg  20 mg Oral EVERY OTHER DAY    metoprolol succinate (TOPROL-XL) XL tablet 50 mg  50 mg Oral DAILY    tamsulosin (FLOMAX) capsule 0.4 mg  0.4 mg Oral DAILY    acetaminophen (TYLENOL) tablet 650 mg  650 mg Oral Q4H PRN    ondansetron (ZOFRAN) injection 4 mg  4 mg IntraVENous Q4H PRN    heparin (porcine) injection 5,000 Units  5,000 Units SubCUTAneous Q12H    amLODIPine (NORVASC) tablet 5 mg  5 mg Oral DAILY            Lab Review:     No results for input(s): WBC, HGB, HCT, PLT, HGBEXT, HCTEXT, PLTEXT in the last 72 hours.   Recent Labs      11/26/17 0514 11/25/17   0510   NA  141  138   K  3.7  3.9   CL  111*  107   CO2  22  23   GLU  100  80   BUN  38*  46*   CREA  1.66*  1.85*   CA  8.2*  8.6     No results found for: GLUCPOC      Total time spent with patient: 30 895 [address] discussed with: Patient    Code Status: Full    Prophylaxis:  Hep SQ    Disposition:  SNF/LTC           ___________________________________________________    Attending Physician: Daria Chaudhari MD

## 2017-11-27 NOTE — PROGRESS NOTES
Spiritual Care Partner Volunteer visited patient in Gen Surg on November 27, 2017.   Documented by:  LATHA Kaur, [facility], melanie SINGH [facility] Paging Service  [phone]

## 2017-11-27 NOTE — PROGRESS NOTES
Nutrition LOS Screen  LOS: 3    Assessment:  Pt admit c inability to walk 2' gout. MST negative for previous nutritional risk factors. His appetite has been excellent. He does have a hx of dementia. Labs reviewed, WNL. Discharge planning is underway. Past Medical History:   Diagnosis Date    Abdominal aortic aneurysm ([address]     Arthritis     CAD (coronary artery disease)     bypass    Hypertension     Other ill-defined conditions(799.89)     gout    Other ill-defined conditions(799.89)     high cholesterol       % Eaten:  Patient Vitals for the past 72 hrs:   % Diet Eaten   11/26/17 1807 100 %   11/26/17 1431 100 %   11/26/17 1013 100 %   11/25/17 1744 100 %   11/25/17 1458 100 %   11/25/17 0813 100 %     Pertinent Medications: [x]Reviewed:   Pertinent Labs: [x]Reviewed:   Food Allergies: [x]NKFA  []Other   Last BM: 11/26  Edema:        []RUE   []LUE   []RLE   [x]LLE      Pressure Ulcer:      [] Stage I   [] Stage II   [] Stage III   [] Stage IV      Wt Readings from Last 30 Encounters:   11/21/17 81.6 kg (180 lb)   05/13/16 90.7 kg (200 lb)   11/08/14 97.7 kg (215 lb 6.2 oz)   10/31/14 94.1 kg (207 lb 6 oz)   05/22/13 88 kg (194 lb)       Anthropometrics:   Height: 6' (182.9 cm) Weight: 81.6 kg (180 lb)     BMI: Body mass index is 24.41 kg/(m^2). This BMI is indicative of:   []Underweight    [x]Normal    []Overweight    [] Obesity   [] Extreme Obesity (BMI>40)     Chart reviewed, pt determined to be at Low risk. Full assessment by RD will done in 4-6 days.       Masood Raymond RD, [address]   Pager: [phone]

## 2017-11-27 NOTE — PROGRESS NOTES
CM spoke to pt's family, via telephone regarding pt's d/c needs. It was reported that the family had selected two SNF's: [address] [place] [address] and [place], with [facility] being pt's family first choice. LEIDA informed family that she will send a referral to [facility] (via [facility]), and will continue to follow up with pt and family once Birdie Yuen has been received.     BLADE Rogel   [phone]

## 2017-11-27 NOTE — PROGRESS NOTES
Patient's safety maintained. Does not call appropriately to use the bathroom. Telesitter at bedside. Alert and oriented x4 but forgetful and confused at times. Mental status improved from Friday. Diarrhea x1. Plan to d/c to SNF today.

## 2017-11-28 VITALS
OXYGEN SATURATION: 100 % | TEMPERATURE: 97.8 F | HEIGHT: 72 IN | BODY MASS INDEX: 24.38 KG/M2 | SYSTOLIC BLOOD PRESSURE: 100 MMHG | DIASTOLIC BLOOD PRESSURE: 62 MMHG | HEART RATE: 52 BPM | WEIGHT: 180 LBS | RESPIRATION RATE: 16 BRPM

## 2017-11-28 PROCEDURE — 97165 OT EVAL LOW COMPLEX 30 MIN: CPT | Performed by: OCCUPATIONAL THERAPIST

## 2017-11-28 PROCEDURE — 74011250637 HC RX REV CODE- 250/637: Performed by: INTERNAL MEDICINE

## 2017-11-28 PROCEDURE — G8988 SELF CARE GOAL STATUS: HCPCS | Performed by: OCCUPATIONAL THERAPIST

## 2017-11-28 PROCEDURE — 74011250636 HC RX REV CODE- 250/636: Performed by: INTERNAL MEDICINE

## 2017-11-28 PROCEDURE — 97535 SELF CARE MNGMENT TRAINING: CPT | Performed by: OCCUPATIONAL THERAPIST

## 2017-11-28 PROCEDURE — 97530 THERAPEUTIC ACTIVITIES: CPT | Performed by: PHYSICAL THERAPIST

## 2017-11-28 PROCEDURE — 97116 GAIT TRAINING THERAPY: CPT | Performed by: PHYSICAL THERAPIST

## 2017-11-28 PROCEDURE — 74011000258 HC RX REV CODE- 258: Performed by: INTERNAL MEDICINE

## 2017-11-28 PROCEDURE — 74011250637 HC RX REV CODE- 250/637: Performed by: PSYCHIATRY & NEUROLOGY

## 2017-11-28 PROCEDURE — G8987 SELF CARE CURRENT STATUS: HCPCS | Performed by: OCCUPATIONAL THERAPIST

## 2017-11-28 RX ORDER — MEMANTINE HYDROCHLORIDE 10 MG/1
10 TABLET ORAL DAILY
Qty: 10 TAB | Refills: 0 | Status: SHIPPED | OUTPATIENT
Start: 2017-12-10 | End: 2018-03-15 | Stop reason: SDUPTHER

## 2017-11-28 RX ORDER — AMLODIPINE BESYLATE 5 MG/1
5 TABLET ORAL DAILY
Qty: 10 TAB | Refills: 0 | Status: SHIPPED | OUTPATIENT
Start: 2017-11-29 | End: 2018-03-15 | Stop reason: SDUPTHER

## 2017-11-28 RX ORDER — QUETIAPINE FUMARATE 25 MG/1
12.5 TABLET, FILM COATED ORAL
Qty: 10 TAB | Refills: 0 | Status: SHIPPED | OUTPATIENT
Start: 2017-11-28 | End: 2018-03-16 | Stop reason: SDUPTHER

## 2017-11-28 RX ORDER — ALLOPURINOL 100 MG/1
100 TABLET ORAL DAILY
Qty: 10 TAB | Refills: 0 | Status: SHIPPED | OUTPATIENT
Start: 2017-11-29 | End: 2018-03-15 | Stop reason: SDUPTHER

## 2017-11-28 RX ADMIN — AMLODIPINE BESYLATE 5 MG: 5 TABLET ORAL at 09:00

## 2017-11-28 RX ADMIN — MEMANTINE 5 MG: 10 TABLET ORAL at 08:59

## 2017-11-28 RX ADMIN — SODIUM CHLORIDE 50 ML/HR: 450 INJECTION, SOLUTION INTRAVENOUS at 09:09

## 2017-11-28 RX ADMIN — FAMOTIDINE 20 MG: 20 TABLET ORAL at 09:00

## 2017-11-28 RX ADMIN — FUROSEMIDE 20 MG: 20 TABLET ORAL at 09:00

## 2017-11-28 RX ADMIN — HEPARIN SODIUM 5000 UNITS: 5000 INJECTION, SOLUTION INTRAVENOUS; SUBCUTANEOUS at 06:41

## 2017-11-28 RX ADMIN — TAMSULOSIN HYDROCHLORIDE 0.4 MG: 0.4 CAPSULE ORAL at 09:00

## 2017-11-28 RX ADMIN — ALLOPURINOL 100 MG: 100 TABLET ORAL at 09:00

## 2017-11-28 RX ADMIN — METOPROLOL SUCCINATE 50 MG: 50 TABLET, EXTENDED RELEASE ORAL at 08:59

## 2017-11-28 NOTE — PROGRESS NOTES
General Surgery End of Shift Nursing Note    Bedside shift change report given to Dora Rosado (oncoming nurse) by Worthy Hashimoto (offgoing nurse). Report included the following information SBAR, Kardex, Intake/Output, MAR and Recent Results. Shift worked:   D   Summary of shift:    0   Issues for physician to address:   0     Number times ambulated in hallway past shift: 1    Number of times OOB to chair past shift: 2    Pain Management:  Current medication: 0  Patient states pain is manageable on current pain medication: YES    GI:    Current diet:  DIET GI LITE (POST SURGICAL)    Tolerating current diet: YES  Passing flatus: YES  Last Bowel Movement: yesterday   Appearance: 0    Respiratory:    Incentive Spirometer at bedside: YES  Patient instructed on use: YES    Patient Safety:    Falls Score: 1  Bed Alarm On? Yes  Sitter?  No    Alysha Herrera RN

## 2017-11-28 NOTE — PROGRESS NOTES
CM ws given information from pt's son: Temi Rodriguez and his spouse, in regards to pt medical care and decision making. Temi Rodriguez gave CM contact information to his sister: Gildardo Munoz [phone]. CM contacted his sister, via telephone, and informed Gildardo Munoz of [facility] title, and position at [facility]. CM informed Gildardo Munoz that pt has been at [facility] since (admission date), and that he is going to a SNF (gave the name of SNF). CM informed Gildardo Munoz that son will like to be the decision maker of pt's medical needs and care, as of today. Gildardo Munoz informed that she was \"okay\" (nonchalantly) with her brother: Temi Rodriguez, becoming the POA and medial decision maker of pt. CM informed Gildardo Munoz that she will make a note of the conversation held, in pt's chart, and asked Gildardo Tolentinosbury if she had additional questions for CM, and Gildardo Munoz stated no. Care Management Interventions  Mode of Transport at Discharge:  Other (see comment)  Transition of Care Consult (CM Consult): Discharge Planning, SNF  Discharge Durable Medical Equipment: No  Physical Therapy Consult: Yes  Occupational Therapy Consult: No  Speech Therapy Consult: No  Current Support Network: Lives Alone, Own Home  Confirm Follow Up Transport: Family  Plan discussed with Pt/Family/Caregiver: Yes  Freedom of Choice Offered: Yes  Discharge Location  Discharge Placement: Skilled nursing facility    BLADE Fritz   [phone]

## 2017-11-28 NOTE — PROGRESS NOTES
Problem: Falls - Risk of  Goal: *Absence of Falls  Document Drew Fall Risk and appropriate interventions in the flowsheet. Fall Risk Interventions:  Mobility Interventions: Assess mobility with egress test, Bed/chair exit alarm, Patient to call before getting OOB, PT Consult for mobility concerns, PT Consult for assist device competence    Mentation Interventions: Bed/chair exit alarm, Door open when patient unattended, Familiar objects from home    Medication Interventions: Bed/chair exit alarm, Patient to call before getting OOB, Teach patient to arise slowly    Elimination Interventions: Call light in reach, Toilet paper/wipes in reach    History of Falls Interventions: Bed/chair exit alarm, Consult care management for discharge planning, Room close to nurse's station        Problem: Patient Education: Go to Patient Education Activity  Goal: Patient/Family Education  Outcome: Progressing Towards Goal  Call bell within reach, siderails upx3, walks with walker (one assist), PT working with patient today. Up in chair with assist. Bed alarm on.

## 2017-11-28 NOTE — PROGRESS NOTES
Problem: Self Care Deficits Care Plan (Adult)  Goal: *Acute Goals and Plan of Care (Insert Text)  Occupational Therapy Goals:  Initiated 11/28/2017  1. Patient will perform grooming standing with item retrieval with supervision/set-up within 7 days. 2. Patient will perform toileting with supervision/set-up within 7 days. 3. Patient will perform upper body dressing and lower body dressing with supervision/set-up within 7 days. 4. Patient will transfer from toilet with supervision/set-up using the least restrictive device and appropriate durable medical equipment within 7 days. 5. Patient will perform one IADL task in standing with supervision within 7 days. Occupational Therapy EVALUATION  Patient: Beth Montague (80 y.o. male)  Date: 11/28/2017  Primary Diagnosis: Inability to walk  Inability to walk        Precautions: fall, bed alarm        ASSESSMENT :  Based on the objective data described below, the patient presents with confusion and was only oriented to himself. He was very pleasant this session but stated he was at home and that it was 1992. CGA for mobility and ADLS overall with path deviations to the left at times. Pt would benefit from [facility] due to mental status. Pt would benefit from SNF for rehab as pt lives alone. Pt will need 24/7 assist once rehab is complete if mentation does not improve. Pt is not safe to manage his own meds, finances or  ADLS without assist due to mental status. Patient will benefit from skilled intervention to address the above impairments.   Patients rehabilitation potential is considered to be Fair  Factors which may influence rehabilitation potential include:   []             None noted  [x]             Mental ability/status  []             Medical condition  []             Home/family situation and support systems  []             Safety awareness  []             Pain tolerance/management  []             Other:      PLAN :  Recommendations and Planned Interventions:  [x]               Self Care Training                  [x]        Therapeutic Activities  [x]               Functional Mobility Training    []        Cognitive Retraining  [x]               Therapeutic Exercises           []        Endurance Activities  [x]               Balance Training                   []        Neuromuscular Re-Education  []               Visual/Perceptual Training     [x]   Home Safety Training  [x]               Patient Education                 [x]        Family Training/Education  []               Other (comment):    Frequency/Duration: Patient will be followed by occupational therapy 3 times a week to address goals. Discharge Recommendations: Skilled Nursing Facility  Further Equipment Recommendations for Discharge: defer to SNF     SUBJECTIVE:   Patient stated I am at home.     OBJECTIVE DATA SUMMARY:   HISTORY:   Past Medical History:   Diagnosis Date    Abdominal aortic aneurysm ([address]     Arthritis     CAD (coronary artery disease)     bypass    Hypertension     Other ill-defined conditions(799.89)     gout    Other ill-defined conditions(799.89)     high cholesterol     Past Surgical History:   Procedure Laterality Date    ABDOMEN SURGERY PROC UNLISTED      hernia    CARDIAC SURG PROCEDURE UNLIST      bypass surgery    HX CATARACT REMOVAL      HX TONSILLECTOMY         Prior Level of Function/Environment/Context: per pt he was independent without assist devices; when asked if he managed his meds or finances pt was not able to state if he had assist or not    Expanded or extensive additional review of patient history:     Home Situation  Home Environment: Apartment  # Steps to Enter: 15  Rails to Enter: Yes  Hand Rails : Bilateral  One/Two Story Residence: One story  Living Alone: Yes  Support Systems: Friends \ neighbors  Patient Expects to be Discharged to[de-identified] Apartment  Current DME Used/Available at Home: Crutches  Tub or Shower Type: Tub/Shower combination  [x]  Right hand dominant   []  Left hand dominant    EXAMINATION OF PERFORMANCE DEFICITS:  Cognitive/Behavioral Status:  Neurologic State: Alert;Confused  Orientation Level: Oriented to person;Disoriented to place; Disoriented to time;Disoriented to situation  Cognition: Follows commands;Decreased attention/concentration;Memory loss  Perception: Appears intact  Perseveration: No perseveration noted  Safety/Judgement: Decreased awareness of environment; Fall prevention      Hearing: Auditory  Auditory Impairment: None    Vision/Perceptual:                           Acuity: Within Defined Limits         Range of Motion:    AROM: Generally decreased, functional                         Strength:    Strength: Generally decreased, functional                Coordination:  Coordination: Generally decreased, functional  Fine Motor Skills-Upper: Left Intact; Right Intact    Gross Motor Skills-Upper: Left Intact; Right Intact    Tone & Sensation:    Tone: Normal                         Balance:  Sitting: Intact  Standing: Impaired  Standing - Static: Good; Unsupported  Standing - Dynamic : Fair    Functional Mobility and Transfers for ADLs:  Bed Mobility:  Supine to Sit: Supervision  Scooting: Supervision    Transfers:  Sit to Stand: Stand-by asssistance  Stand to Sit: Stand-by asssistance  Bed to Chair: Contact guard assistance  Toilet Transfer : Contact guard assistance    ADL Assessment:  Feeding: Independent    Oral Facial Hygiene/Grooming: Contact guard assistance    Bathing: Contact guard assistance    Upper Body Dressing: Contact guard assistance    Lower Body Dressing: Contact guard assistance    Toileting: Contact guard assistance                ADL Intervention and task modifications:   Mobilized to bathroom with CGA. Verbal cues for scanning environment to locate tooth brush. Stood at sink to [place] with CGA. Pt was attempting to put spit basin and tooth brush on grab bar not on shelf.     Cognitive Retraining  Safety/Judgement: Decreased awareness of environment; Fall prevention    Barthel Index:    Bathin  Bladder: 10  Bowels: 10  Groomin  Dressin  Feeding: 10  Mobility: 10  Stairs: 5  Toilet Use: 5  Transfer (Bed to Chair and Back): 10  Total: 70       Barthel and G-code impairment scale:  Percentage of impairment CH  0% CI  1-19% CJ  20-39% CK  40-59% CL  60-79% CM  80-99% CN  100%   Barthel Score 0-100 100 99-80 79-60 59-40 20-39 1-19   0   Barthel Score 0-20 20 17-19 13-16 9-12 5-8 1-4 0      The Barthel ADL Index: Guidelines  1. The index should be used as a record of what a patient does, not as a record of what a patient could do. 2. The main aim is to establish degree of independence from any help, physical or verbal, however minor and for whatever reason. 3. The need for supervision renders the patient not independent. 4. A patient's performance should be established using the best available evidence. Asking the patient, friends/relatives and nurses are the usual sources, but direct observation and common sense are also important. However direct testing is not needed. 5. Usually the patient's performance over the preceding 24-48 hours is important, but occasionally longer periods will be relevant. 6. Middle categories imply that the patient supplies over 50 per cent of the effort. 7. Use of aids to be independent is allowed. Poly Mcclellan., Barthel, D.W. (9436). Functional evaluation: the Barthel Index. [facility] (14)2. CARITO Dewey Monty Phy., Mikayla MinaHCA [facility], [address] [place] [address] (). Measuring the change indisability after inpatient rehabilitation; comparison of the responsiveness of the Barthel Index and Functional Green Measure. Journal of Neurology, Neurosurgery, and Psychiatry, 66(4), 557-199. Marcello Wong NSydneyJ.A, LEONCIO Liu, & Mercedes Soliz MSydneyA. (2004.) Assessment of post-stroke quality of life in cost-effectiveness studies:  The usefulness of the Barthel Index and the EuroQoL-5D. Quality of Life Research, 13, 681-26         G codes: In compliance with CMSs Claims Based Outcome Reporting, the following G-code set was chosen for this patient based on their primary functional limitation being treated: The outcome measure chosen to determine the severity of the functional limitation was the barthel with a score of 70/100 which was correlated with the impairment scale. ? Self Care:     - CURRENT STATUS: CJ - 20%-39% impaired, limited or restricted    - GOAL STATUS: CI - 1%-19% impaired, limited or restricted    - D/C STATUS:  ---------------To be determined---------------     Occupational Therapy Evaluation Charge Determination   History Examination Decision-Making   MEDIUM Complexity : Expanded review of history including physical, cognitive and psychosocial  history  MEDIUM Complexity : 3-5 performance deficits relating to physical, cognitive , or psychosocial skils that result in activity limitations and / or participation restrictions MEDIUM Complexity : Patient may present with comorbidities that affect occupational performnce. Miniml to moderate modification of tasks or assistance (eg, physical or verbal ) with assesment(s) is necessary to enable patient to complete evaluation       Based on the above components, the patient evaluation is determined to be of the following complexity level: MEDIUM  Pain:  Pain Scale 1: Numeric (0 - 10)  Pain Intensity 1: 0              Activity Tolerance:     Please refer to the flowsheet for vital signs taken during this treatment. After treatment:   [x] Patient left in no apparent distress sitting up in chair  [] Patient left in no apparent distress in bed  [x] Call bell left within reach  [x] Nursing notified  [] Caregiver present  [x] Bed alarm activated    COMMUNICATION/EDUCATION:   The patients plan of care was discussed with: Physical Therapist, Registered Nurse and patient.   [x] Home safety education was provided and the patient/caregiver indicated understanding. [] Patient/family have participated as able in goal setting and plan of care. [x] Patient agree to work toward stated goals and plan of care. [] Patient understands intent and goals of therapy, but is neutral about his/her participation. [] Patient is unable to participate in goal setting and plan of care. This patients plan of care is appropriate for delegation to MADELINE.     Thank you for this referral.  Matty Davis, OTR/L  Time Calculation: 23 mins

## 2017-11-28 NOTE — PROGRESS NOTES
Physical Therapy Goals  Initiated 11/22/2017  1. Patient will move from supine to sit and sit to supine , scoot up and down and roll side to side in bed with supervision/set-up within 7 day(s). 2.  Patient will transfer from bed to chair and chair to bed with supervision/set-up using the least restrictive device within 7 day(s). 3.  Patient will perform sit to stand with supervision/set-up within 7 day(s). 4.  Patient will ambulate with supervision/set-up for 100 feet with the least restrictive device within 7 day(s). 5.  Patient will ascend/descend 12 stairs with 1 handrail(s) with supervision/set-up within 7 day(s). physical Therapy TREATMENT  Patient: Kiran Quinonez (80 y.o. male)  Date: 11/28/2017  Diagnosis: Inability to walk  Inability to walk <principal problem not specified>       Precautions:  falls    ASSESSMENT: Patient demonstrating slow steady progress in therapy. Knee pain has resolved with improvements noted in overall mobility. Patient requiring supervision for bed mobility and SBA for transfers. Gait is slow and shuffled using RW but steady overall with no LOB noted. Patient does however demonstrate safety impairments with use of walker secondary to mild confusion and overall decreased safety awareness. Transitioned to no device and gait bruna improved but gait slightly unsteady but no overt LOB noted. Patient remains a fall risk especially on unlevel surfaces. Patient lives alone with minimal support from friends and neighbors. Continue to recommend SNF following discharge to improve balance and overall functional independence. Once home patient would benefit from 24 hour supervision.     Progression toward goals:  [x]    Improving appropriately and progressing toward goals  []    Improving slowly and progressing toward goals  []    Not making progress toward goals and plan of care will be adjusted     PLAN:  Patient continues to benefit from skilled intervention to address the above impairments. Continue treatment per established plan of care. Discharge Recommendations:  Aki Meyer   Further Equipment Recommendations for Discharge:  TBD by SNF     SUBJECTIVE:   Patient stated I'm doing fine.     OBJECTIVE DATA SUMMARY:   Critical Behavior:  Neurologic State: Alert, Confused  Orientation Level: Appropriate for age, Oriented to person; disoriented to time  Cognition:   Follows commands     Functional Mobility Training:  Bed Mobility:     Supine to Sit: Supervision     Scooting: Supervision        Transfers:  Sit to Stand: Stand-by asssistance  Stand to Sit: Stand-by asssistance        Bed to Chair: Contact guard assistance                    Balance:  Sitting: Intact  Standing: Impaired  Standing - Static: Good; Unsupported  Standing - Dynamic : Fair  Ambulation/Gait Training:  Distance (ft): 250 Feet (ft)  Assistive Device:  (RW then progressed to no device)  Ambulation - Level of Assistance: Contact guard assistance        Gait Abnormalities: Decreased step clearance        Base of Support: Narrowed     Speed/Darlene: Pace decreased (<100 feet/min); Shuffled  Step Length: Left shortened;Right shortened         Patient ambulated 100 feet using RW for support; gait is slow and shuffled but steady with no LOB noted.  Transitioned to no device with improvement in darlene noted but gait remained shuffled and mildly unsteady; no overt LOB noted          Neuro Re-Education:  Patient performing balance activities including side stepping, walking backwards, picking up object from floor- fair standing balance noted     Pain:  Pain Scale 1: Numeric (0 - 10)  Pain Intensity 1: 0       After treatment:   [x]    Patient left in no apparent distress sitting up in chair  []    Patient left in no apparent distress in bed  [x]    Call bell left within reach  [x]    Nursing notified  []    Caregiver present  [x]    Bed alarm activated    COMMUNICATION/COLLABORATION:   The patients plan of care was discussed with: Registered Nurse,  and Rehabilitation Attendant    Meng Dowling, PT   Time Calculation: 22 mins

## 2017-11-28 NOTE — DISCHARGE SUMMARY
Physician Discharge Summary     Patient ID:  Zoran Inman  [id number]  71 y.o.  3/15/1933    Admit date: 11/21/2017    Discharge date and time: 11/28/2017    Admission Diagnoses: Inability to walk  Inability to walk    Discharge Diagnoses: Active Problems:    Inability to walk (11/22/2017)           Hospital Course:   81yo M with CAD, CKD3, HTN admitted with acute gout flare. Pt reported chronic gout with flares presented with left knee pain and swelling over last 2.5days. He notes no fever, no redness/streaking. No trauma. Notes no falls on the knee but decreased mobility. Unable to bear weight today and came to ED. Notes not taking colchicine as is in his medication list. Not recently on steroids. Did not call his PCP prior to arrival in ED. He has never had fluid removed from a joint he could recall. Notes that he has had gout \"practically in all my joints, but seems worse in this left ankle and knee\". ED showed no DVT. S/p left knee arthrocentesis and 120cc straw fluid removed. Orthopedics has seen and evaluated in ED and post depomedrol and lidocaine injection patient still unable to ambulate safely with shuffling gait and inability to comprehend the crutches. Given exhaustion of ability to find a safe dc in the ED given elevated fall risk, we were asked to observe further given his inability to ambulate on the knee. 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 Left knee pain. .due to above; Duplex is negative for DVT. improved. CKD-3b. .  .follow renal function. Give gentle hydration and monitor. Hyponatremia: so far resolved,   - monitor. HTN:  so far better controlled,   c/w Norvasc, metoprolol and lasix,use hydralazine prn.   CAD  Hypercholesterolemia  Confusion/Dementia:   patient had hallucinations, CT head is negative,   - Neurology consulted and recommended Namenda  5 mg daily for 1 week, 5 mg p.o. twice daily for 1 week, 10 mg in a.m. and 5 mg at night for 1 week and then 10 mg p.o. twice daily. low dose Seroquel at night    Pt remained stable for discharge to SNF to follow up with PCP in 2-4wks    PCP: Melany Fernández MD       Condition of patient at discharge: stable    Discharge Exam:  Please refer to progress note from today. Disposition: SNF    Patient Instructions:   Current Discharge Medication List      START taking these medications    Details   allopurinol (ZYLOPRIM) 100 mg tablet Take 1 Tab by mouth daily. Qty: 10 Tab, Refills: 0      amLODIPine (NORVASC) 5 mg tablet Take 1 Tab by mouth daily. Qty: 10 Tab, Refills: 0      memantine (NAMENDA) 10 mg tablet Take 1 Tab by mouth daily. 5 mg daily for 1 week, 5 mg p.o. twice daily for 1 week, 10 mg in a.m. and 5 mg at night for 1 week and then 10 mg p.o. twice daily  Qty: 10 Tab, Refills: 0      QUEtiapine (SEROQUEL) 25 mg tablet Take 0.5 Tabs by mouth nightly. Qty: 10 Tab, Refills: 0         CONTINUE these medications which have NOT CHANGED    Details   famotidine (PEPCID) 20 mg tablet Take 1 Tab by mouth two (2) times a day. Qty: 20 Tab, Refills: 0      atropine-PHENobarbital-scopolamine-hyoscyamine (DONNATAL) 16.2-0.1037 -0.0194 mg per tablet Take 1 Tab by mouth every six (6) hours as needed for Pain. Max Daily Amount: 4 Tabs. Qty: 20 Tab, Refills: 0      HYDROcodone-acetaminophen (NORCO) 5-325 mg per tablet Take 2 tablets by mouth every four (4) hours as needed. Qty: 25 tablet, Refills: o      tamsulosin (FLOMAX) 0.4 mg capsule Take 1 capsule by mouth daily. Qty: 30 capsule, Refills: 0      furosemide (LASIX) 20 mg tablet Take 20 mg by mouth every other day. Indications: EDEMA      doxazosin (CARDURA) 4 mg tablet Take 4 mg by mouth nightly. Indications: HYPERTENSION      metoprolol-XL (TOPROL-XL) 50 mg XL tablet Take 1 Tab by mouth daily.   Qty: 30 Tab, Refills: 0         STOP taking these medications colchicine 0.6 mg tablet Comments:   Reason for Stopping:             Activity: Activity as tolerated  Diet: Cardiac Diet    Follow-up with Jermaine Rodriguez MD in 1-2weeks  Follow-up tests/labs n/a    Approximate time spent in patient care on day of discharge: Greater than 30mins     Signed:  Elizabeth Juarez MD  11/28/2017  2:33 PM

## 2017-11-28 NOTE — PROGRESS NOTES
CM was made aware that pt has been accepted to [facility],[address].   CM will update pt's family and MD.    Yuniel Brown, MSW CM  [phone]

## 2017-11-28 NOTE — PROGRESS NOTES
Hospitalist Progress Note      NAME: Kristine Chun. 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 - allopurinol. Left knee pain. .due to above; Duplex is negative for DVT. improved. CKD-3b. .  .follow renal function. Give gentle hydration and monitor. Hyponatremia: so far resolved,   - monitor. HTN:  so far better controlled,   c/w Norvasc, metoprolol and lasix,use hydralazine prn. CAD  Hypercholesterolemia  Confusion/Dementia:   patient had hallucinations, CT head is negative,   - Neurology help appreciated,  - Namenda, low dose Seroquel at night    Body mass index is 24.41 kg/(m^2). Code status: Full  Prophylaxis: Hep SQ  Recommended Disposition: SNF     Patient will need SNF spoke with son Ronna Choi  At [phone], plan for D/c soon to [facility]        Subjective:     Chief Complaint:  Gout        ROS:  (bold if positive, if negative)      Tolerating PT  Tolerating Diet          Objective:       Vitals:          Last 24hrs VS reviewed since prior progress note.  Most recent are:    Visit Vitals    /85    Pulse (!) 55    Temp 97.7 °F (36.5 °C)    Resp 19    Ht 6' (1.829 m)    Wt 81.6 kg (180 lb)    SpO2 100%    BMI 24.41 kg/m2     SpO2 Readings from Last 6 Encounters:   17 100%   16 99%   14 95%   10/31/14 98%   13 100%            Intake/Output Summary (Last 24 hours) at 17 0905  Last data filed at 17 0903   Gross per 24 hour   Intake                0 ml   Output              900 ml   Net             -900 ml          Exam:     Physical Exam:    Gen:  Well-developed, well-nourished, in no acute distress  HEENT:  Pink conjunctivae, PERRL, hearing intact to voice, moist mucous membranes  Neck:  Supple, without masses, thyroid non-tender  Resp:  No accessory muscle use, clear breath sounds without wheezes, rales or rhonchi  Card:  No murmurs, normal S1, S2 without thrills, bruits or peripheral edema  Abd:  Soft, non-tender, non-distended, normoactive bowel sounds are present  Musc:  No cyanosis, clubbing or edema  Skin:  No rashes or ulcers, skin turgor is good  Neuro:  Cranial nerves 3-12 are grossly intact, follows commands appropriately  Psych:  Good insight, oriented to person, place and time, alert       Telemetry reviewed:   normal sinus rhythm    Medications Reviewed: (see below)    Lab Data Reviewed: (see below)    ______________________________________________________________________    Medications:     Current Facility-Administered Medications   Medication Dose Route Frequency    hydrALAZINE (APRESOLINE) 20 mg/mL injection 10 mg  10 mg IntraVENous Q6H PRN    QUEtiapine (SEROquel) tablet 12.5 mg  12.5 mg Oral QHS    0.45% sodium chloride infusion  50 mL/hr IntraVENous CONTINUOUS    allopurinol (ZYLOPRIM) tablet 100 mg  100 mg Oral DAILY    memantine (NAMENDA) tablet 5 mg  5 mg Oral DAILY    [START ON 12/3/2017] memantine (NAMENDA) tablet 5 mg  5 mg Oral Q12H    [START ON 12/10/2017] memantine (NAMENDA) tablet 10 mg  10 mg Oral DAILY    And    [START ON 12/10/2017] memantine (NAMENDA) tablet 5 mg  5 mg Oral QHS    [START ON 12/17/2017] memantine (NAMENDA) tablet 10 mg  10 mg Oral Q12H    famotidine (PEPCID) tablet 20 mg  20 mg Oral DAILY    doxazosin (CARDURA) tablet 4 mg  4 mg Oral QHS    furosemide (LASIX) tablet 20 mg  20 mg Oral EVERY OTHER DAY    metoprolol succinate (TOPROL-XL) XL tablet 50 mg  50 mg Oral DAILY    tamsulosin (FLOMAX) capsule 0.4 mg  0.4 mg Oral DAILY    acetaminophen (TYLENOL) tablet 650 mg  650 mg Oral Q4H PRN    ondansetron (ZOFRAN) injection 4 mg  4 mg IntraVENous Q4H PRN    heparin (porcine) injection 5,000 Units  5,000 Units SubCUTAneous Q12H    amLODIPine (NORVASC) tablet 5 mg  5 mg Oral DAILY            Lab Review:     No results for input(s): WBC, HGB, HCT, PLT, HGBEXT, HCTEXT, PLTEXT, HGBEXT, HCTEXT, PLTEXT in the last 72 hours.   Recent Labs      11/26/17   0514   NA  141   K  3.7   CL  111*   CO2  22   GLU  100   BUN  38*   CREA  1.66*   CA  8.2*     No results found for: GLUCPOC      Total time spent with patient: 30 895 [address] discussed with: Patient    Code Status: Full    Prophylaxis:  Hep SQ    Disposition:  SNF/LTC           ___________________________________________________    Attending Physician: Franca Jimenez MD

## 2017-11-28 NOTE — PROGRESS NOTES
Interdisciplinary Rounds were completed on this patient. Rounds included nursing, clinical care leader, pharmacy, and case management. Patient was doing well without problems. Patient had the following concerns: none. Goals for the day will include: mobilize.      Anticipated discharge date: today or tomorrow (patient awaiting placement at [address] [place] [address],[address])

## 2017-12-05 LAB
BACTERIA SPEC CULT: ABNORMAL
BACTERIA SPEC CULT: ABNORMAL
GRAM STN SPEC: ABNORMAL
SERVICE CMNT-IMP: ABNORMAL

## 2018-01-30 ENCOUNTER — HOSPITAL ENCOUNTER (EMERGENCY)
Age: 83
Discharge: HOME | End: 2018-01-30
Attending: EMERGENCY MEDICINE
Payer: MEDICARE

## 2018-01-30 ENCOUNTER — APPOINTMENT (OUTPATIENT)
Dept: GENERAL RADIOLOGY | Age: 83
End: 2018-01-30
Attending: EMERGENCY MEDICINE
Payer: MEDICARE

## 2018-01-30 VITALS
DIASTOLIC BLOOD PRESSURE: 67 MMHG | HEART RATE: 45 BPM | BODY MASS INDEX: 27.09 KG/M2 | RESPIRATION RATE: 17 BRPM | SYSTOLIC BLOOD PRESSURE: 136 MMHG | TEMPERATURE: 97.9 F | OXYGEN SATURATION: 95 % | HEIGHT: 72 IN | WEIGHT: 200 LBS

## 2018-01-30 DIAGNOSIS — R55 NEAR SYNCOPE: Primary | ICD-10-CM

## 2018-01-30 LAB
ALBUMIN SERPL-MCNC: 3.3 G/DL (ref 3.5–5)
ALBUMIN/GLOB SERPL: 0.9 {RATIO} (ref 1.1–2.2)
ALP SERPL-CCNC: 66 U/L (ref 45–117)
ALT SERPL-CCNC: 31 U/L (ref 12–78)
ANION GAP SERPL CALC-SCNC: 5 MMOL/L (ref 5–15)
APPEARANCE UR: CLEAR
AST SERPL-CCNC: 26 U/L (ref 15–37)
ATRIAL RATE: 47 BPM
BACTERIA URNS QL MICRO: NEGATIVE /HPF
BASOPHILS # BLD: 0 K/UL (ref 0–0.1)
BASOPHILS NFR BLD: 0 % (ref 0–1)
BILIRUB SERPL-MCNC: 0.4 MG/DL (ref 0.2–1)
BILIRUB UR QL: NEGATIVE
BUN SERPL-MCNC: 21 MG/DL (ref 6–20)
BUN/CREAT SERPL: 12 (ref 12–20)
CALCIUM SERPL-MCNC: 8.4 MG/DL (ref 8.5–10.1)
CALCULATED P AXIS, ECG09: 45 DEGREES
CALCULATED R AXIS, ECG10: -32 DEGREES
CALCULATED T AXIS, ECG11: 19 DEGREES
CHLORIDE SERPL-SCNC: 99 MMOL/L (ref 97–108)
CO2 SERPL-SCNC: 31 MMOL/L (ref 21–32)
COLOR UR: ABNORMAL
CREAT SERPL-MCNC: 1.72 MG/DL (ref 0.7–1.3)
DIAGNOSIS, 93000: NORMAL
DIFFERENTIAL METHOD BLD: ABNORMAL
EOSINOPHIL # BLD: 0.2 K/UL (ref 0–0.4)
EOSINOPHIL NFR BLD: 3 % (ref 0–7)
EPITH CASTS URNS QL MICRO: ABNORMAL /LPF
ERYTHROCYTE [DISTWIDTH] IN BLOOD BY AUTOMATED COUNT: 14.7 % (ref 11.5–14.5)
GLOBULIN SER CALC-MCNC: 3.5 G/DL (ref 2–4)
GLUCOSE SERPL-MCNC: 92 MG/DL (ref 65–100)
GLUCOSE UR STRIP.AUTO-MCNC: NEGATIVE MG/DL
HCT VFR BLD AUTO: 33.5 % (ref 36.6–50.3)
HGB BLD-MCNC: 11.2 G/DL (ref 12.1–17)
HGB UR QL STRIP: NEGATIVE
IMM GRANULOCYTES # BLD: 0 K/UL (ref 0–0.04)
IMM GRANULOCYTES NFR BLD AUTO: 0 % (ref 0–0.5)
KETONES UR QL STRIP.AUTO: NEGATIVE MG/DL
LEUKOCYTE ESTERASE UR QL STRIP.AUTO: ABNORMAL
LYMPHOCYTES # BLD: 1.2 K/UL (ref 0.8–3.5)
LYMPHOCYTES NFR BLD: 20 % (ref 12–49)
MAGNESIUM SERPL-MCNC: 1.9 MG/DL (ref 1.6–2.4)
MCH RBC QN AUTO: 34.5 PG (ref 26–34)
MCHC RBC AUTO-ENTMCNC: 33.4 G/DL (ref 30–36.5)
MCV RBC AUTO: 103.1 FL (ref 80–99)
MONOCYTES # BLD: 0.7 K/UL (ref 0–1)
MONOCYTES NFR BLD: 12 % (ref 5–13)
NEUTS SEG # BLD: 3.9 K/UL (ref 1.8–8)
NEUTS SEG NFR BLD: 65 % (ref 32–75)
NITRITE UR QL STRIP.AUTO: NEGATIVE
NRBC # BLD: 0 K/UL (ref 0–0.01)
NRBC BLD-RTO: 0 PER 100 WBC
P-R INTERVAL, ECG05: 134 MS
PH UR STRIP: 6 [PH] (ref 5–8)
PLATELET # BLD AUTO: 145 K/UL (ref 150–400)
PMV BLD AUTO: 9.5 FL (ref 8.9–12.9)
POTASSIUM SERPL-SCNC: 4 MMOL/L (ref 3.5–5.1)
PROT SERPL-MCNC: 6.8 G/DL (ref 6.4–8.2)
PROT UR STRIP-MCNC: NEGATIVE MG/DL
Q-T INTERVAL, ECG07: 484 MS
QRS DURATION, ECG06: 134 MS
QTC CALCULATION (BEZET), ECG08: 428 MS
RBC # BLD AUTO: 3.25 M/UL (ref 4.1–5.7)
RBC #/AREA URNS HPF: ABNORMAL /HPF (ref 0–5)
RBC MORPH BLD: ABNORMAL
RBC MORPH BLD: ABNORMAL
SODIUM SERPL-SCNC: 135 MMOL/L (ref 136–145)
SP GR UR REFRACTOMETRY: 1.01 (ref 1–1.03)
TROPONIN I SERPL-MCNC: <0.04 NG/ML
UA: UC IF INDICATED,UAUC: ABNORMAL
UROBILINOGEN UR QL STRIP.AUTO: 0.2 EU/DL (ref 0.2–1)
VENTRICULAR RATE, ECG03: 47 BPM
WBC # BLD AUTO: 6 K/UL (ref 4.1–11.1)
WBC URNS QL MICRO: ABNORMAL /HPF (ref 0–4)

## 2018-01-30 PROCEDURE — 99285 EMERGENCY DEPT VISIT HI MDM: CPT

## 2018-01-30 PROCEDURE — 85025 COMPLETE CBC W/AUTO DIFF WBC: CPT | Performed by: EMERGENCY MEDICINE

## 2018-01-30 PROCEDURE — 84484 ASSAY OF TROPONIN QUANT: CPT | Performed by: EMERGENCY MEDICINE

## 2018-01-30 PROCEDURE — 81001 URINALYSIS AUTO W/SCOPE: CPT | Performed by: EMERGENCY MEDICINE

## 2018-01-30 PROCEDURE — 80053 COMPREHEN METABOLIC PANEL: CPT | Performed by: EMERGENCY MEDICINE

## 2018-01-30 PROCEDURE — 71046 X-RAY EXAM CHEST 2 VIEWS: CPT

## 2018-01-30 PROCEDURE — 36415 COLL VENOUS BLD VENIPUNCTURE: CPT | Performed by: EMERGENCY MEDICINE

## 2018-01-30 PROCEDURE — 93005 ELECTROCARDIOGRAM TRACING: CPT

## 2018-01-30 PROCEDURE — 83735 ASSAY OF MAGNESIUM: CPT | Performed by: EMERGENCY MEDICINE

## 2018-01-30 NOTE — DISCHARGE INSTRUCTIONS
Fainting: Care Instructions  Your Care Instructions    When you faint, or pass out, you lose consciousness for a short time. A brief drop in blood flow to the brain often causes it. When you fall or lie down, more blood flows to your brain and you regain consciousness. Emotional stress, pain, or overheating-especially if you have been standing-can make you faint. In these cases, fainting is usually not serious. But fainting can be a sign of a more serious problem. Your doctor may want you to have more tests to rule out other causes. The treatment you need depends on the reason why you fainted. The doctor has checked you carefully, but problems can develop later. If you notice any problems or new symptoms, get medical treatment right away. Follow-up care is a key part of your treatment and safety. Be sure to make and go to all appointments, and call your doctor if you are having problems. It's also a good idea to know your test results and keep a list of the medicines you take. How can you care for yourself at home? · Drink plenty of fluids to prevent dehydration. If you have kidney, heart, or liver disease and have to limit fluids, talk with your doctor before you increase your fluid intake. When should you call for help? Call 911 anytime you think you may need emergency care. For example, call if:  ? · You have symptoms of a heart problem. These may include:  ¨ Chest pain or pressure. ¨ Severe trouble breathing. ¨ A fast or irregular heartbeat. ¨ Lightheadedness or sudden weakness. ¨ Coughing up pink, foamy mucus. ¨ Passing out. After you call 911, the  may tell you to chew 1 adult-strength or 2 to 4 low-dose aspirin. Wait for an ambulance. Do not try to drive yourself. ? · You have symptoms of a stroke. These may include:  ¨ Sudden numbness, tingling, weakness, or loss of movement in your face, arm, or leg, especially on only one side of your body. ¨ Sudden vision changes.   ¨ Sudden trouble speaking. ¨ Sudden confusion or trouble understanding simple statements. ¨ Sudden problems with walking or balance. ¨ A sudden, severe headache that is different from past headaches. ? · You passed out (lost consciousness) again. ? Watch closely for changes in your health, and be sure to contact your doctor if:  ? · You do not get better as expected. Where can you learn more? Go to http://yann-ravinder.info/. Enter W129 in the search box to learn more about \"Fainting: Care Instructions. \"  Current as of: March 20, 2017  Content Version: 11.4  © 3007-6159 [email or web address]. Care instructions adapted under license by [employer] (which disclaims liability or warranty for this information). If you have questions about a medical condition or this instruction, always ask your healthcare professional. [address] any warranty or liability for your use of this information.

## 2018-01-30 NOTE — ED PROVIDER NOTES
EMERGENCY DEPARTMENT HISTORY AND PHYSICAL EXAM      Date: 1/30/2018  Patient Name: Shelli Nichols    History of Presenting Illness     Chief Complaint   Patient presents with    Dizziness       History Provided By: Patient    HPI: Shelli Nichols, 80 y.o. male with PMHx significant for CAD, HTN, gout, hypercholesterolemia, and AAA, presents via EMS to the ED for evaluation of lightheadedness and dizziness x ~2 hours PTA today. Patient states he felt \"woozy\" after standing. He denies using a walker at home. Patient reports his home health nurse was concerned he took his night medications in addition to his morning medications this morning. He also states the home health nurse was concerned for gait and speech changes, but denies speech or gait difficulty at time of examination. Pt denies any focal weakness, vision changes, or chest pain. PCP: Cesario Campbell MD    There are no other complaints, changes, or physical findings at this time. Current Outpatient Prescriptions   Medication Sig Dispense Refill    allopurinol (ZYLOPRIM) 100 mg tablet Take 1 Tab by mouth daily. 10 Tab 0    amLODIPine (NORVASC) 5 mg tablet Take 1 Tab by mouth daily. 10 Tab 0    memantine (NAMENDA) 10 mg tablet Take 1 Tab by mouth daily. 5 mg daily for 1 week, 5 mg p.o. twice daily for 1 week, 10 mg in a.m. and 5 mg at night for 1 week and then 10 mg p.o. twice daily 10 Tab 0    QUEtiapine (SEROQUEL) 25 mg tablet Take 0.5 Tabs by mouth nightly. 10 Tab 0    tamsulosin (FLOMAX) 0.4 mg capsule Take 1 capsule by mouth daily. 30 capsule 0    furosemide (LASIX) 20 mg tablet Take 20 mg by mouth every other day. Indications: EDEMA      metoprolol-XL (TOPROL-XL) 50 mg XL tablet Take 1 Tab by mouth daily.  30 Tab 0       Past History     Past Medical History:  Past Medical History:   Diagnosis Date    Abdominal aortic aneurysm (HCC)     Arthritis     CAD (coronary artery disease)     bypass    Hypertension     Other ill-defined conditions(799.89)     gout    Other ill-defined conditions(799.89)     high cholesterol       Past Surgical History:  Past Surgical History:   Procedure Laterality Date    ABDOMEN SURGERY PROC UNLISTED      hernia    CARDIAC SURG PROCEDURE UNLIST      bypass surgery    HX CATARACT REMOVAL      HX TONSILLECTOMY         Family History:  Family History   Problem Relation Age of Onset    Cancer Mother      liver    Hypertension Father     No Known Problems Sister     No Known Problems Brother     No Known Problems Sister      brain aneurysym       Social History:  Social History   Substance Use Topics    Smoking status: Former Smoker    Smokeless tobacco: Never Used    Alcohol use No       Allergies:  No Known Allergies      Review of Systems   Review of Systems   Constitutional: Negative for chills and fever. HENT: Negative for congestion and sore throat. Eyes: Negative for visual disturbance. Respiratory: Negative for cough and shortness of breath. Cardiovascular: Negative for chest pain and leg swelling. Gastrointestinal: Negative for abdominal pain, blood in stool, diarrhea and nausea. Endocrine: Negative for polyuria. Genitourinary: Negative for dysuria and testicular pain. Musculoskeletal: Negative for arthralgias, joint swelling and myalgias. Skin: Negative for rash. Allergic/Immunologic: Negative for immunocompromised state. Neurological: Positive for dizziness (with ambulation) and light-headedness (with ambulation). Negative for speech difficulty, weakness and headaches. Hematological: Does not bruise/bleed easily. Psychiatric/Behavioral: Negative for confusion. Physical Exam   Physical Exam   Constitutional: He is oriented to person, place, and time. He appears well-developed and well-nourished. HENT:   Head: Normocephalic and atraumatic.    Mouth/Throat: Oropharynx is clear and moist.   Eyes: Conjunctivae and EOM are normal. Pupils are equal, round, and reactive to light. Neck: Normal range of motion. Neck supple. No tracheal deviation present. Cardiovascular: Regular rhythm, normal heart sounds and intact distal pulses. Bradycardia present. No murmur heard. Pulmonary/Chest: Effort normal and breath sounds normal. No respiratory distress. He has no wheezes. He has no rales. Abdominal: Soft. Bowel sounds are normal. There is no tenderness. There is no rebound and no guarding. Musculoskeletal: He exhibits edema (pitting, bilateral lower extremities). He exhibits no deformity. Arthritic changes to right hand   Neurological: He is alert and oriented to person, place, and time. GCS eye subscore is 4. GCS verbal subscore is 5. GCS motor subscore is 6. EOMI intact, no facial droop or asymmetry, normal/equal sensation in face. Uvula elevates at midline, no tongue deviation. Normal strength with head rotation and shoulder shrug. 5/5 Strength in the bilateral upper and lower extremities, no pronotor drift, normal finger to nose. No truncal ataxia. Normal speech: no dysarthria or aphasia. Shuffling gait   Skin: Skin is warm and dry. Psychiatric: He has a normal mood and affect. His speech is normal.   Nursing note and vitals reviewed.         Diagnostic Study Results     Labs -     Recent Results (from the past 12 hour(s))   EKG, 12 LEAD, INITIAL    Collection Time: 01/30/18 10:54 AM   Result Value Ref Range    Ventricular Rate 47 BPM    Atrial Rate 47 BPM    P-R Interval 134 ms    QRS Duration 134 ms    Q-T Interval 484 ms    QTC Calculation (Bezet) 428 ms    Calculated P Axis 45 degrees    Calculated R Axis -32 degrees    Calculated T Axis 19 degrees    Diagnosis       Sinus bradycardia  Left axis deviation  Right bundle branch block  Possible Inferior infarct , age undetermined     CBC WITH AUTOMATED DIFF    Collection Time: 01/30/18 11:23 AM   Result Value Ref Range    WBC 6.0 4.1 - 11.1 K/uL    RBC 3.25 (L) 4.10 - 5.70 M/uL    HGB 11.2 (L) 12.1 - 17.0 g/dL    HCT 33.5 (L) 36.6 - 50.3 %    .1 (H) 80.0 - 99.0 FL    MCH 34.5 (H) 26.0 - 34.0 PG    MCHC 33.4 30.0 - 36.5 g/dL    RDW 14.7 (H) 11.5 - 14.5 %    PLATELET 622 (L) 838 - 400 K/uL    MPV 9.5 8.9 - 12.9 FL    NRBC 0.0 0  WBC    ABSOLUTE NRBC 0.00 0.00 - 0.01 K/uL    NEUTROPHILS 65 32 - 75 %    LYMPHOCYTES 20 12 - 49 %    MONOCYTES 12 5 - 13 %    EOSINOPHILS 3 0 - 7 %    BASOPHILS 0 0 - 1 %    IMMATURE GRANULOCYTES 0 0.0 - 0.5 %    ABS. NEUTROPHILS 3.9 1.8 - 8.0 K/UL    ABS. LYMPHOCYTES 1.2 0.8 - 3.5 K/UL    ABS. MONOCYTES 0.7 0.0 - 1.0 K/UL    ABS. EOSINOPHILS 0.2 0.0 - 0.4 K/UL    ABS. BASOPHILS 0.0 0.0 - 0.1 K/UL    ABS. IMM. GRANS. 0.0 0.00 - 0.04 K/UL    DF SMEAR SCANNED      RBC COMMENTS ANISOCYTOSIS  1+        RBC COMMENTS MACROCYTOSIS  PRESENT       METABOLIC PANEL, COMPREHENSIVE    Collection Time: 01/30/18 11:23 AM   Result Value Ref Range    Sodium 135 (L) 136 - 145 mmol/L    Potassium 4.0 3.5 - 5.1 mmol/L    Chloride 99 97 - 108 mmol/L    CO2 31 21 - 32 mmol/L    Anion gap 5 5 - 15 mmol/L    Glucose 92 65 - 100 mg/dL    BUN 21 (H) 6 - 20 MG/DL    Creatinine 1.72 (H) 0.70 - 1.30 MG/DL    BUN/Creatinine ratio 12 12 - 20      GFR est AA 46 (L) >60 ml/min/1.73m2    GFR est non-AA 38 (L) >60 ml/min/1.73m2    Calcium 8.4 (L) 8.5 - 10.1 MG/DL    Bilirubin, total 0.4 0.2 - 1.0 MG/DL    ALT (SGPT) 31 12 - 78 U/L    AST (SGOT) 26 15 - 37 U/L    Alk.  phosphatase 66 45 - 117 U/L    Protein, total 6.8 6.4 - 8.2 g/dL    Albumin 3.3 (L) 3.5 - 5.0 g/dL    Globulin 3.5 2.0 - 4.0 g/dL    A-G Ratio 0.9 (L) 1.1 - 2.2     MAGNESIUM    Collection Time: 01/30/18 11:23 AM   Result Value Ref Range    Magnesium 1.9 1.6 - 2.4 mg/dL   TROPONIN I    Collection Time: 01/30/18 11:23 AM   Result Value Ref Range    Troponin-I, Qt. <0.04 <0.05 ng/mL   URINALYSIS W/ REFLEX CULTURE    Collection Time: 01/30/18 12:24 PM   Result Value Ref Range    Color YELLOW/STRAW      Appearance CLEAR CLEAR      Specific gravity 1.009 1.003 - 1.030      pH (UA) 6.0 5.0 - 8.0      Protein NEGATIVE  NEG mg/dL    Glucose NEGATIVE  NEG mg/dL    Ketone NEGATIVE  NEG mg/dL    Bilirubin NEGATIVE  NEG      Blood NEGATIVE  NEG      Urobilinogen 0.2 0.2 - 1.0 EU/dL    Nitrites NEGATIVE  NEG      Leukocyte Esterase TRACE (A) NEG      WBC PENDING /hpf    RBC PENDING /hpf    Epithelial cells PENDING /lpf    Bacteria PENDING /hpf    UA:UC IF INDICATED PENDING        Radiologic Studies -   CXR Results  (Last 48 hours)               01/30/18 1131  XR CHEST PA LAT Final result    Impression:  IMPRESSION: Small left pleural effusion. Large hiatal hernia. Lungs otherwise   clear. Narrative:  EXAM:  XR CHEST PA LAT       INDICATION:   pneumonia       COMPARISON: 5/13/2016. FINDINGS: PA and lateral radiographs of the chest demonstrate blunting of the   left costophrenic angle consistent with small left pleural effusion. . There is a   large hiatal hernia containing fluid levels. Heart size stable and normal..    Previous sternotomy noted. .                    Medical Decision Making   I am the first provider for this patient. I reviewed the vital signs, available nursing notes, past medical history, past surgical history, family history and social history. Vital Signs-Reviewed the patient's vital signs. Patient Vitals for the past 12 hrs:   Temp Pulse Resp BP SpO2   01/30/18 1103 - (!) 45 17 - -   01/30/18 1100 - - - 143/71 -   01/30/18 1059 97.9 °F (36.6 °C) (!) 47 16 143/71 95 %       Pulse Oximetry Analysis - 95% on RA    Cardiac Monitor:   Rate: 47 bpm  Rhythm: Sinus Bradycardia     EKG interpretation: 10:54  Rhythm: sinus bradycardia; and regular . Rate (approx.): 47; Axis: normal; NM interval: normal; QRS interval: 134 ms; ST/T wave: normal; Other findings: no ischemic changes; QT/QTc 484/428 ms.     Records Reviewed: Nursing Notes and Old Medical Records    Provider Notes (Medical Decision Making):     Pt sent here for reported dizziness, noticed by nurse. She does not describe ataxia, she describes his shuffling gait, which appears to be more of a chronic problem. He has not focal deficits on exam, and ambulates without significant difficulty. He is slightly bradycardic, but appears to be asymptomatic from that. Pt denies any symptoms at this time. Will obtain screening labs to assess for electrolyte disturbance, CXR for evidence of PNA/CHF. Plan is likely for discharge to home. ED Course:   Initial assessment performed. The patients presenting problems have been discussed, and they are in agreement with the care plan formulated and outlined with them. I have encouraged them to ask questions as they arise throughout their visit. 11:54 AM  Old records reviewed; pt is normally bradycardic. Instructed to hold night time medicines tonight secondary to bradycardia. Disposition:  DISCHARGE NOTE:  12:54 PM  The patient is ready for discharge. The patients signs, symptoms, diagnosis, and instructions for discharge have been discussed and the pt has conveyed their understanding. The patient is to follow up as recommended or return to the ER should their symptoms worsen. Plan has been discussed and patient has conveyed their agreement. PLAN:  1. Follow-up Information     Follow up With Details Comments Contact Info    Merna Pearce MD Call in 1 day  [address] [facility]  [phone]      [facility] EMERGENCY DEPT  If symptoms worsen [address] [place] [address]  [phone]        Return to ED if worse     Diagnosis     Clinical Impression:   1. Near syncope        Attestations: This note is prepared by Lois Bolivar, acting as Scribe for DO Princess Ackerman DO: The scribe's documentation has been prepared under my direction and personally reviewed by me in its entirety.  I confirm that the note above accurately reflects all work, treatment, procedures, and medical decision making performed by me.

## 2018-01-30 NOTE — ED NOTES
Pt given discharge information. All questions and concerns answered. No additional needs at this time. Pt phoning family members at this time for transportation.

## 2018-03-15 ENCOUNTER — HOSPITAL ENCOUNTER (OUTPATIENT)
Dept: LAB | Age: 83
Discharge: HOME | End: 2018-03-15
Payer: MEDICARE

## 2018-03-15 ENCOUNTER — OFFICE VISIT (OUTPATIENT)
Dept: FAMILY MEDICINE CLINIC | Age: 83
End: 2018-03-15

## 2018-03-15 VITALS
TEMPERATURE: 96.6 F | WEIGHT: 187 LBS | HEIGHT: 72 IN | HEART RATE: 50 BPM | SYSTOLIC BLOOD PRESSURE: 142 MMHG | RESPIRATION RATE: 20 BRPM | DIASTOLIC BLOOD PRESSURE: 74 MMHG | OXYGEN SATURATION: 95 % | BODY MASS INDEX: 25.33 KG/M2

## 2018-03-15 DIAGNOSIS — M10.9 GOUTY ARTHRITIS: ICD-10-CM

## 2018-03-15 DIAGNOSIS — Z13.220 SCREENING CHOLESTEROL LEVEL: ICD-10-CM

## 2018-03-15 DIAGNOSIS — M19.90 OSTEOARTHRITIS DEFORMANS: ICD-10-CM

## 2018-03-15 DIAGNOSIS — F32.A ANXIETY AND DEPRESSION: ICD-10-CM

## 2018-03-15 DIAGNOSIS — Z00.00 ENCOUNTER FOR MEDICARE ANNUAL WELLNESS EXAM: Primary | ICD-10-CM

## 2018-03-15 DIAGNOSIS — E55.9 HYPOVITAMINOSIS D: ICD-10-CM

## 2018-03-15 DIAGNOSIS — I10 HYPERTENSION GOAL BP (BLOOD PRESSURE) < 130/80: ICD-10-CM

## 2018-03-15 DIAGNOSIS — Z13.29 SCREENING FOR THYROID DISORDER: ICD-10-CM

## 2018-03-15 DIAGNOSIS — N40.0 BPH WITHOUT URINARY OBSTRUCTION: ICD-10-CM

## 2018-03-15 DIAGNOSIS — N18.30 CKD (CHRONIC KIDNEY DISEASE) STAGE 3, GFR 30-59 ML/MIN (HCC): ICD-10-CM

## 2018-03-15 DIAGNOSIS — Z23 ENCOUNTER FOR IMMUNIZATION: ICD-10-CM

## 2018-03-15 DIAGNOSIS — F41.9 ANXIETY AND DEPRESSION: ICD-10-CM

## 2018-03-15 DIAGNOSIS — Z00.00 HEALTHCARE MAINTENANCE: ICD-10-CM

## 2018-03-15 DIAGNOSIS — F01.50 VASCULAR DEMENTIA WITHOUT BEHAVIORAL DISTURBANCE (HCC): ICD-10-CM

## 2018-03-15 DIAGNOSIS — R35.0 FREQUENCY OF URINATION: ICD-10-CM

## 2018-03-15 PROCEDURE — 36415 COLL VENOUS BLD VENIPUNCTURE: CPT

## 2018-03-15 PROCEDURE — 84443 ASSAY THYROID STIM HORMONE: CPT

## 2018-03-15 PROCEDURE — 81001 URINALYSIS AUTO W/SCOPE: CPT

## 2018-03-15 PROCEDURE — 80061 LIPID PANEL: CPT

## 2018-03-15 PROCEDURE — 82306 VITAMIN D 25 HYDROXY: CPT

## 2018-03-15 PROCEDURE — 85025 COMPLETE CBC W/AUTO DIFF WBC: CPT

## 2018-03-15 PROCEDURE — 80053 COMPREHEN METABOLIC PANEL: CPT

## 2018-03-15 RX ORDER — MEMANTINE HYDROCHLORIDE 10 MG/1
10 TABLET ORAL DAILY
Qty: 10 TAB | Refills: 0 | Status: SHIPPED | OUTPATIENT
Start: 2018-03-15 | End: 2018-03-16 | Stop reason: SDUPTHER

## 2018-03-15 RX ORDER — TAMSULOSIN HYDROCHLORIDE 0.4 MG/1
0.4 CAPSULE ORAL DAILY
Qty: 30 CAP | Refills: 5 | Status: SHIPPED | OUTPATIENT
Start: 2018-03-15 | End: 2018-09-16 | Stop reason: SDUPTHER

## 2018-03-15 RX ORDER — ALLOPURINOL 100 MG/1
100 TABLET ORAL DAILY
Qty: 30 TAB | Refills: 4 | Status: SHIPPED | OUTPATIENT
Start: 2018-03-15 | End: 2018-05-23 | Stop reason: SDUPTHER

## 2018-03-15 RX ORDER — AMLODIPINE BESYLATE 5 MG/1
5 TABLET ORAL DAILY
Qty: 30 TAB | Refills: 3 | Status: SHIPPED | OUTPATIENT
Start: 2018-03-15 | End: 2018-05-23 | Stop reason: SDUPTHER

## 2018-03-15 NOTE — PROGRESS NOTES
Chief Complaint   Patient presents with    [facility]     HPI:  This is a Subsequent Medicare Annual Wellness Exam (AWV) (Performed 12 months after IPPE or effective date of Medicare Part B enrollment)  I have reviewed the patient's medical history in detail and updated the computerized patient record. History     Past Medical History:   Diagnosis Date    Abdominal aortic aneurysm (HCC)     Arthritis     CAD (coronary artery disease)     bypass    Hypertension     Other ill-defined conditions(799.89)     gout    Other ill-defined conditions(799.89)     high cholesterol      Past Surgical History:   Procedure Laterality Date    ABDOMEN SURGERY PROC UNLISTED      hernia    CARDIAC SURG PROCEDURE UNLIST      bypass surgery    HX CATARACT REMOVAL      HX TONSILLECTOMY       Current Outpatient Prescriptions   Medication Sig Dispense Refill    varicella zoster vaccine live (VARICELLA-ZOSTER VACINE LIVE) 19,400 unit/0.65 mL susr injection 1 Vial by SubCUTAneous route once for 1 dose. 0.65 mL 0    allopurinol (ZYLOPRIM) 100 mg tablet Take 1 Tab by mouth daily. 10 Tab 0    amLODIPine (NORVASC) 5 mg tablet Take 1 Tab by mouth daily. 10 Tab 0    memantine (NAMENDA) 10 mg tablet Take 1 Tab by mouth daily. 5 mg daily for 1 week, 5 mg p.o. twice daily for 1 week, 10 mg in a.m. and 5 mg at night for 1 week and then 10 mg p.o. twice daily 10 Tab 0    QUEtiapine (SEROQUEL) 25 mg tablet Take 0.5 Tabs by mouth nightly. 10 Tab 0    tamsulosin (FLOMAX) 0.4 mg capsule Take 1 capsule by mouth daily. 30 capsule 0    furosemide (LASIX) 20 mg tablet Take 20 mg by mouth every other day. Indications: EDEMA      metoprolol-XL (TOPROL-XL) 50 mg XL tablet Take 1 Tab by mouth daily.  27 Tab 0     No Known Allergies  Family History   Problem Relation Age of Onset   [facility] Mother      liver    Hypertension Father     No Known Problems Sister     No Known Problems Brother     No Known Problems Sister      brain aneurysym     Social History   Substance Use Topics    Smoking status: Former Smoker    Smokeless tobacco: Never Used    Alcohol use No     Patient Active Problem List   Diagnosis Code    AAA (abdominal aortic aneurysm) ([facility]) I71.4    Coronary artery disease involving native coronary artery without angina pectoris I25.10    S/P CABG (coronary artery bypass graft) Z95.1    Hiatal hernia with gastroesophageal reflux K21.9, K44.9    H/O endovascular stent graft for abdominal aortic aneurysm Z95.828    CKD (chronic kidney disease) stage 3, GFR 30-59 ml/min N18.3    Inability to walk R26.2       Depression Risk Factor Screening:   No flowsheet data found. Alcohol Risk Factor Screening: You do not drink alcohol or very rarely. Functional Ability and Level of Safety:   Hearing Loss  Hearing is good. Activities of Daily Living  The home contains: no safety equipment. Patient needs help with:  transportation, preparing meals, laundry, housework and managing medications    Fall Risk  No flowsheet data found.     Abuse Screen  Patient is not abused    Cognitive Screening   Evaluation of Cognitive Function:  Has your family/caregiver stated any concerns about your memory: yes  Normal    Patient Care Team   Patient Care Team:  Hossein Jean Baptiste MD as PCP - General (Internal Medicine)    Assessment/Plan   Education and counseling provided:  Are appropriate based on today's review and evaluation  Cardiovascular screening blood test  Screening for glaucoma  Diabetes screening test  Diagnoses and all orders for this visit:    Encounter for Medicare annual wellness exam  -     [facility]    Encounter for immunization  -     Pneumococcal conjugate 13 valent, IM (PREVNAR-13)  -     GA IMMUNIZ ADMIN,1 SINGLE/COMB VAC/TOXOID  -     varicella zoster vaccine live (VARICELLA-ZOSTER VACINE LIVE) 19,400 unit/0.65 mL susr injection; 1 Vial by SubCUTAneous route once for 1 dose., Print, Disp-0.65 mL, R-0    CKD (chronic kidney disease) stage 3, GFR 30-59 ml/min    Gouty arthritis  -     allopurinol (ZYLOPRIM) 100 mg tablet; Take 1 Tab by mouth daily. , Normal, Disp-30 Tab, R-4    Vascular dementia without behavioral disturbance  -     memantine (NAMENDA) 10 mg tablet; Take 1 Tab by mouth daily. 5 mg daily for 1 week, 5 mg p.o. twice daily for 1 week, 10 mg in a.m. and 5 mg at night for 1 week and then 10 mg p.o. twice daily, Print, Disp-10 Tab, R-0    BPH without urinary obstruction  -     tamsulosin (FLOMAX) 0.4 mg capsule; Take 1 Cap by mouth daily. , Normal, Disp-30 Cap, R-5    Anxiety and depression    Hypertension goal BP (blood pressure) < 130/80  -     amLODIPine (NORVASC) 5 mg tablet; Take 1 Tab by mouth daily. , Normal, Disp-30 Tab, R-3    Osteoarthritis deformans  -     Nabil Arthritis [address] [place] [address]    Screening for thyroid disorder  -     TSH 3RD GENERATION    Screening cholesterol level  -     LIPID PANEL    Hypovitaminosis D  -     VITAMIN D, 25 HYDROXY    Frequency of urination  -     URINALYSIS W/ RFLX MICROSCOPIC    Other orders  -     Discontinue: varicella zoster vaccine live (VARICELLA-ZOSTER VACINE LIVE) 19,400 unit/0.65 mL susr injection; 1 Vial by SubCUTAneous route once for 1 dose., Normal, Disp-0.65 mL, R-0      Patient Instructions        Well Visit, Over 65: Care Instructions  Your Care Instructions    Physical exams can help you stay healthy. Your doctor has checked your overall health and may have suggested ways to take good care of yourself. He or she also may have recommended tests. At home, you can help prevent illness with healthy eating, regular exercise, and other steps. Follow-up care is a key part of your treatment and safety. Be sure to make and go to all appointments, and call your doctor if you are having problems.  It's also a good idea to know your test results and keep a list of the medicines you take.  How can you care for yourself at home? · Reach and stay at a healthy weight. This will lower your risk for many problems, such as obesity, diabetes, heart disease, and high blood pressure. · Get at least 30 minutes of exercise on most days of the week. Walking is a good choice. You also may want to do other activities, such as running, swimming, cycling, or playing tennis or team sports. · Do not smoke. Smoking can make health problems worse. If you need help quitting, talk to your doctor about stop-smoking programs and medicines. These can increase your chances of quitting for good. · Protect your skin from too much sun. When you're outdoors from 10 a.m. to 4 p.m., stay in the shade or cover up with clothing and a hat with a wide brim. Wear sunglasses that block UV rays. Even when it's cloudy, put broad-spectrum sunscreen (SPF 30 or higher) on any exposed skin. · See a dentist one or two times a year for checkups and to have your teeth cleaned. · Wear a seat belt in the car. · Limit alcohol to 2 drinks a day for men and 1 drink a day for women. Too much alcohol can cause health problems. Follow your doctor's advice about when to have certain tests. These tests can spot problems early. For men and women  · Cholesterol. Your doctor will tell you how often to have this done based on your overall health and other things that can increase your risk for heart attack and stroke. · Blood pressure. Have your blood pressure checked during a routine doctor visit. Your doctor will tell you how often to check your blood pressure based on your age, your blood pressure results, and other factors. · Diabetes. Ask your doctor whether you should have tests for diabetes. · Vision. Experts recommend that you have yearly exams for glaucoma and other age-related eye problems. · Hearing. Tell your doctor if you notice any change in your hearing. You can have tests to find out how well you hear. · Colon cancer tests. Keep having colon cancer tests as your doctor recommends. You can have one of several types of tests. · Heart attack and stroke risk. At least every 4 to 6 years, you should have your risk for heart attack and stroke assessed. Your doctor uses factors such as your age, blood pressure, cholesterol, and whether you smoke or have diabetes to show what your risk for a heart attack or stroke is over the next 10 years. · Osteoporosis. Talk to your doctor about whether you should have a bone density test to find out whether you have thinning bones. Also ask your doctor about whether you should take calcium and vitamin D supplements. For women  · Pap test and pelvic exam. You may no longer need a Pap test. Talk with your doctor about whether to stop or continue to have Pap tests. · Breast exam and mammogram. Ask how often you should have a mammogram, which is an X-ray of your breasts. A mammogram can spot breast cancer before it can be felt and when it is easiest to treat. · Thyroid disease. Talk to your doctor about whether to have your thyroid checked as part of a regular physical exam. Women have an increased chance of a thyroid problem. For men  · Prostate exam. Talk to your doctor about whether you should have a blood test (called a PSA test) for prostate cancer. Experts disagree on whether men should have this test. Some experts recommend that you discuss the benefits and risks of the test with your doctor. · Abdominal aortic aneurysm. Ask your doctor whether you should have a test to check for an aneurysm. You may need a test if you ever smoked or if your parent, brother, sister, or child has had an aneurysm. When should you call for help? Watch closely for changes in your health, and be sure to contact your doctor if you have any problems or symptoms that concern you. Where can you learn more? Go to http://yann-ravinder.info/.   Enter W924 in the search box to learn more about \"Well Visit, Over 72: Care Instructions. \"  Current as of: May 12, 2017  Content Version: 11.4  © 7673-8044 Health[employer], [employer]. Care instructions adapted under license by [employer] (which disclaims liability or warranty for this information). If you have questions about a medical condition or this instruction, always ask your healthcare professional. [place] [address] any warranty or liability for your use of this information. Follow-up Disposition:  Return if symptoms worsen or fail to improve, for routine follow up.

## 2018-03-15 NOTE — PATIENT INSTRUCTIONS

## 2018-03-15 NOTE — PROGRESS NOTES
Chief Complaint   Patient presents with   [facility].     Patient her today to establish care. C/O right swelling fingers x 3 month.

## 2018-03-16 ENCOUNTER — TELEPHONE (OUTPATIENT)
Dept: FAMILY MEDICINE CLINIC | Age: 83
End: 2018-03-16

## 2018-03-16 DIAGNOSIS — F01.50 VASCULAR DEMENTIA WITHOUT BEHAVIORAL DISTURBANCE (HCC): ICD-10-CM

## 2018-03-16 LAB
25(OH)D3+25(OH)D2 SERPL-MCNC: 34.3 NG/ML (ref 30–100)
ALBUMIN SERPL-MCNC: 4.3 G/DL (ref 3.5–4.7)
ALBUMIN/GLOB SERPL: 1.3 {RATIO} (ref 1.2–2.2)
ALP SERPL-CCNC: 78 IU/L (ref 39–117)
ALT SERPL-CCNC: 15 IU/L (ref 0–44)
APPEARANCE UR: CLEAR
AST SERPL-CCNC: 20 IU/L (ref 0–40)
BACTERIA #/AREA URNS HPF: NORMAL /[HPF]
BASOPHILS # BLD AUTO: 0 X10E3/UL (ref 0–0.2)
BASOPHILS NFR BLD AUTO: 0 %
BILIRUB SERPL-MCNC: 0.6 MG/DL (ref 0–1.2)
BILIRUB UR QL STRIP: NEGATIVE
BUN SERPL-MCNC: 26 MG/DL (ref 8–27)
BUN/CREAT SERPL: 15 (ref 10–24)
CALCIUM SERPL-MCNC: 9.4 MG/DL (ref 8.6–10.2)
CASTS URNS QL MICRO: NORMAL /LPF
CHLORIDE SERPL-SCNC: 101 MMOL/L (ref 96–106)
CHOLEST SERPL-MCNC: 234 MG/DL (ref 100–199)
CO2 SERPL-SCNC: 24 MMOL/L (ref 18–29)
COLOR UR: YELLOW
CREAT SERPL-MCNC: 1.69 MG/DL (ref 0.76–1.27)
EOSINOPHIL # BLD AUTO: 0.2 X10E3/UL (ref 0–0.4)
EOSINOPHIL NFR BLD AUTO: 2 %
EPI CELLS #/AREA URNS HPF: NORMAL /HPF
ERYTHROCYTE [DISTWIDTH] IN BLOOD BY AUTOMATED COUNT: 14.4 % (ref 12.3–15.4)
GFR SERPLBLD CREATININE-BSD FMLA CKD-EPI: 36 ML/MIN/1.73
GFR SERPLBLD CREATININE-BSD FMLA CKD-EPI: 42 ML/MIN/1.73
GLOBULIN SER CALC-MCNC: 3.2 G/DL (ref 1.5–4.5)
GLUCOSE SERPL-MCNC: 97 MG/DL (ref 65–99)
GLUCOSE UR QL: NEGATIVE
HCT VFR BLD AUTO: 38 % (ref 37.5–51)
HDLC SERPL-MCNC: 40 MG/DL
HGB BLD-MCNC: 13 G/DL (ref 13–17.7)
HGB UR QL STRIP: NEGATIVE
IMM GRANULOCYTES # BLD: 0 X10E3/UL (ref 0–0.1)
IMM GRANULOCYTES NFR BLD: 0 %
INTERPRETATION: NORMAL
KETONES UR QL STRIP: NEGATIVE
LDLC SERPL CALC-MCNC: 161 MG/DL (ref 0–99)
LEUKOCYTE ESTERASE UR QL STRIP: NEGATIVE
LYMPHOCYTES # BLD AUTO: 1.8 X10E3/UL (ref 0.7–3.1)
LYMPHOCYTES NFR BLD AUTO: 20 %
MCH RBC QN AUTO: 34.4 PG (ref 26.6–33)
MCHC RBC AUTO-ENTMCNC: 34.2 G/DL (ref 31.5–35.7)
MCV RBC AUTO: 101 FL (ref 79–97)
MICRO URNS: ABNORMAL
MONOCYTES # BLD AUTO: 1.1 X10E3/UL (ref 0.1–0.9)
MONOCYTES NFR BLD AUTO: 12 %
MUCOUS THREADS URNS QL MICRO: PRESENT
NEUTROPHILS # BLD AUTO: 6.1 X10E3/UL (ref 1.4–7)
NEUTROPHILS NFR BLD AUTO: 66 %
NITRITE UR QL STRIP: POSITIVE
PH UR STRIP: 7 [PH] (ref 5–7.5)
PLATELET # BLD AUTO: 197 X10E3/UL (ref 150–379)
POTASSIUM SERPL-SCNC: 4.6 MMOL/L (ref 3.5–5.2)
PROT SERPL-MCNC: 7.5 G/DL (ref 6–8.5)
PROT UR QL STRIP: NEGATIVE
RBC # BLD AUTO: 3.78 X10E6/UL (ref 4.14–5.8)
RBC #/AREA URNS HPF: NORMAL /HPF
SODIUM SERPL-SCNC: 143 MMOL/L (ref 134–144)
SP GR UR: 1.01 (ref 1–1.03)
TRIGL SERPL-MCNC: 167 MG/DL (ref 0–149)
TSH SERPL DL<=0.005 MIU/L-ACNC: 76.51 UIU/ML (ref 0.45–4.5)
UROBILINOGEN UR STRIP-MCNC: 0.2 MG/DL (ref 0.2–1)
VLDLC SERPL CALC-MCNC: 33 MG/DL (ref 5–40)
WBC # BLD AUTO: 9.1 X10E3/UL (ref 3.4–10.8)
WBC #/AREA URNS HPF: NORMAL /HPF

## 2018-03-16 RX ORDER — QUETIAPINE FUMARATE 25 MG/1
12.5 TABLET, FILM COATED ORAL
Qty: 45 TAB | Refills: 0 | Status: SHIPPED | OUTPATIENT
Start: 2018-03-16 | End: 2018-04-18 | Stop reason: SDUPTHER

## 2018-03-16 RX ORDER — MEMANTINE HYDROCHLORIDE 10 MG/1
10 TABLET ORAL 2 TIMES DAILY
Qty: 180 TAB | Refills: 0 | Status: SHIPPED | OUTPATIENT
Start: 2018-03-16 | End: 2018-04-18 | Stop reason: SDUPTHER

## 2018-03-16 RX ORDER — FUROSEMIDE 20 MG/1
20 TABLET ORAL EVERY OTHER DAY
Qty: 45 TAB | Refills: 0 | Status: SHIPPED | OUTPATIENT
Start: 2018-03-16 | End: 2018-04-18 | Stop reason: SDUPTHER

## 2018-03-16 RX ORDER — METOPROLOL SUCCINATE 50 MG/1
50 TABLET, EXTENDED RELEASE ORAL DAILY
Qty: 90 TAB | Refills: 0 | Status: SHIPPED | OUTPATIENT
Start: 2018-03-16 | End: 2018-04-18 | Stop reason: SDUPTHER

## 2018-03-16 NOTE — TELEPHONE ENCOUNTER
yolanda Grant in law, calling in ref to:   Concerned about starting over on the dosage of:     memantine (NAMENDA) 10 mg tablet  She states:   Dr. Sherry Olivo wrote this  1 mo ago for 60 tabs and to take twice a day so, she doesn't think he should be starting the dosage over     She comes up frequently to help with MD apptmnts and wants to try to get this medication for him today       Best number to reach her is [phone]

## 2018-03-16 NOTE — TELEPHONE ENCOUNTER
----- Message from Kevin Shipman sent at 3/16/2018 12:16 PM EDT -----  Regarding: Dr. Rigoberto Cote would like a call back regarding refills for patient.  [facility]# [phone]

## 2018-03-21 ENCOUNTER — OFFICE VISIT (OUTPATIENT)
Dept: CARDIOLOGY CLINIC | Age: 83
End: 2018-03-21

## 2018-03-21 DIAGNOSIS — I25.10 CORONARY ARTERY DISEASE INVOLVING NATIVE CORONARY ARTERY OF NATIVE HEART WITHOUT ANGINA PECTORIS: Primary | ICD-10-CM

## 2018-03-21 NOTE — PROGRESS NOTES
Mr. CoreasYumiko Annie did not keep this appointment. It was snowing earlier today but apparently he did not contact the office.

## 2018-03-30 ENCOUNTER — TELEPHONE (OUTPATIENT)
Dept: FAMILY MEDICINE CLINIC | Age: 83
End: 2018-03-30

## 2018-03-30 NOTE — TELEPHONE ENCOUNTER
Cherelle Hernandez w/[facility]     Previous physician ordered [facility] so, they want to know if Dr. Mamadou Rivas will sign off now.      Best number to reach her is [phone]

## 2018-04-10 ENCOUNTER — HOSPITAL ENCOUNTER (EMERGENCY)
Age: 83
Discharge: HOME | End: 2018-04-10
Attending: EMERGENCY MEDICINE | Admitting: EMERGENCY MEDICINE
Payer: MEDICARE

## 2018-04-10 ENCOUNTER — APPOINTMENT (OUTPATIENT)
Dept: GENERAL RADIOLOGY | Age: 83
End: 2018-04-10
Attending: PHYSICIAN ASSISTANT
Payer: MEDICARE

## 2018-04-10 VITALS
DIASTOLIC BLOOD PRESSURE: 67 MMHG | HEART RATE: 57 BPM | HEIGHT: 72 IN | WEIGHT: 180 LBS | BODY MASS INDEX: 24.38 KG/M2 | SYSTOLIC BLOOD PRESSURE: 133 MMHG | RESPIRATION RATE: 18 BRPM | OXYGEN SATURATION: 100 % | TEMPERATURE: 97.5 F

## 2018-04-10 DIAGNOSIS — M25.461 KNEE EFFUSION, RIGHT: Primary | ICD-10-CM

## 2018-04-10 DIAGNOSIS — M25.561 ACUTE PAIN OF RIGHT KNEE: ICD-10-CM

## 2018-04-10 DIAGNOSIS — M17.11 ARTHRITIS OF RIGHT KNEE: ICD-10-CM

## 2018-04-10 LAB
APPEARANCE FLD: ABNORMAL
BODY FLD TYPE: NORMAL
COLOR FLD: YELLOW
CRYSTALS FLD MICRO: NORMAL
LYMPHOCYTES NFR FLD: 1 %
MONOS+MACROS NFR FLD: 9 %
NEUTROPHILS NFR FLD: 90 %
NUC CELL # FLD: ABNORMAL /CU MM
RBC # FLD: >100 /CU MM
SPECIMEN SOURCE FLD: ABNORMAL

## 2018-04-10 PROCEDURE — 89060 EXAM SYNOVIAL FLUID CRYSTALS: CPT | Performed by: PHYSICIAN ASSISTANT

## 2018-04-10 PROCEDURE — 74011250636 HC RX REV CODE- 250/636: Performed by: PHYSICIAN ASSISTANT

## 2018-04-10 PROCEDURE — 73564 X-RAY EXAM KNEE 4 OR MORE: CPT

## 2018-04-10 PROCEDURE — 89050 BODY FLUID CELL COUNT: CPT | Performed by: PHYSICIAN ASSISTANT

## 2018-04-10 PROCEDURE — 87205 SMEAR GRAM STAIN: CPT | Performed by: PHYSICIAN ASSISTANT

## 2018-04-10 PROCEDURE — 97161 PT EVAL LOW COMPLEX 20 MIN: CPT

## 2018-04-10 PROCEDURE — 99284 EMERGENCY DEPT VISIT MOD MDM: CPT

## 2018-04-10 PROCEDURE — G8980 MOBILITY D/C STATUS: HCPCS

## 2018-04-10 PROCEDURE — 75810000054 HC ARTHOCENTISIS JOINT

## 2018-04-10 PROCEDURE — G8979 MOBILITY GOAL STATUS: HCPCS

## 2018-04-10 PROCEDURE — G8978 MOBILITY CURRENT STATUS: HCPCS

## 2018-04-10 RX ORDER — BUPIVACAINE HYDROCHLORIDE 5 MG/ML
1 INJECTION, SOLUTION EPIDURAL; INTRACAUDAL ONCE
Status: DISCONTINUED | OUTPATIENT
Start: 2018-04-10 | End: 2018-04-10 | Stop reason: HOSPADM

## 2018-04-10 RX ORDER — TRIAMCINOLONE ACETONIDE 40 MG/ML
40 INJECTION, SUSPENSION INTRA-ARTICULAR; INTRAMUSCULAR
Status: COMPLETED | OUTPATIENT
Start: 2018-04-10 | End: 2018-04-10

## 2018-04-10 RX ORDER — HYDROCODONE BITARTRATE AND ACETAMINOPHEN 5; 325 MG/1; MG/1
1 TABLET ORAL
Qty: 10 TAB | Refills: 0 | Status: SHIPPED | OUTPATIENT
Start: 2018-04-10 | End: 2018-04-13

## 2018-04-10 RX ORDER — LIDOCAINE HYDROCHLORIDE 10 MG/ML
2 INJECTION, SOLUTION EPIDURAL; INFILTRATION; INTRACAUDAL; PERINEURAL ONCE
Status: DISCONTINUED | OUTPATIENT
Start: 2018-04-10 | End: 2018-04-10 | Stop reason: HOSPADM

## 2018-04-10 RX ADMIN — TRIAMCINOLONE ACETONIDE 40 MG: 40 INJECTION, SUSPENSION INTRA-ARTICULAR; INTRAMUSCULAR at 15:09

## 2018-04-10 NOTE — PROGRESS NOTES
physical Therapy Emergency Department EVALUATION/DISCHARGE with CMS G codes  Patient: Kaushal Hay (37 y.o. male)  Date: 4/10/2018  Primary Diagnosis: There are no admission diagnoses documented for this encounter. Precautions:      ASSESSMENT :  Based on the objective data described below, the patient presents with effusion R knee s/p aspiration. Asked by Man HOPKINS for eval.  Pt lives alone in second floor apt with 13 steps to enter. He has a friend and friend's wife who try to assist him if needed and friend brought him to ED today. Currently, pt c/o 10/10 pain R knee improving to 8/10 with amb and continued movement. He initially required mod A of 2 to stand from w/c. Once standing first steps with RW required mod A but improved to min A and then SBA only with improved gait from step-to pattern with little weight on RLE to step-through pattern with increased WB. Cues given for proper walker use. Offered training on stairs. Pt declined. Reviewed proper sequencing with pt and friend. Discussed concerns for pt being alone and friend states he will take him home and help him to get into the apt. He will check on him and pt's son is coming to stay with pt tomorrow. Pt states he is comfortable with this and also states he is receiving HHPT currently. Would recommend he con't with HHPT and educated pt that due to current acute events, HHPT could likely request extension if needed as pt believes last session is to be Friday this week. Pt voiced no further questions. Rounded with PA. Further acute physical therapy in the ED is not indicated at this time.      PLAN :  Discharge Recommendations:     []   Home with family  []   Skilled nursing facility  []   Admission to hospital with rehab likely needed  []   Inpatient rehab referral  []   Outpatient physical therapy referral  [x]   Other:Continue HHPT    Further Equipment Recommendations for Discharge:   [x]   Rolling walker with 5\" wheels, pt owns  []   Crutches   []   Alex Rob   []   Wheelchair   []   Other:     COMMUNICATION/EDUCATION:   Communication/Collaboration:  [x]   Fall prevention education was provided and the patient/caregiver indicated understanding. [x]   Patient/family have participated as able and agree with findings and recommendations. []   Patient is unable to participate in plan of care at this time. Findings and recommendations were discussed with: MD physician and PA       SUBJECTIVE:   Patient stated Diana Sotomayor will be ok    OBJECTIVE DATA SUMMARY:     Past Medical History:   Diagnosis Date    Abdominal aortic aneurysm ([address]     Arthritis     CAD (coronary artery disease)     bypass    Hypertension     Other ill-defined conditions(799.89)     gout    Other ill-defined conditions(799.89)     high cholesterol     Past Surgical History:   Procedure Laterality Date    ABDOMEN SURGERY PROC UNLISTED      hernia    CARDIAC SURG PROCEDURE UNLIST      bypass surgery    HX CATARACT REMOVAL      HX TONSILLECTOMY       Prior Level of Function/Home Situation: Pt lives alone in second floor apt with 13 steps to enter. He has a friend and friend's wife who try to assist him if needed and friend brought him to ED today. Home Situation  Home Environment: Private residence  # Steps to Enter: 15  Rails to Enter: Yes  One/Two Story Residence: One story (second floor apt)  Living Alone: Yes  Support Systems: Child(chandrakant), Friends \ neighbors  Patient Expects to be Discharged to[de-identified] Private residence  Current DME Used/Available at Home: doron Capellan, Walker, rolling  Critical Behavior:  Neurologic State: Alert  Orientation Level: Oriented X4  Cognition: Follows commands       Strength:    Strength:  Within functional limits                                    Range Of Motion:  AROM:  (limited R knee due to pain s/p aspiration)           PROM:  (Not stressed R knee)     Functional Mobility:  Bed Mobility:  Rolling:  (Pt in w/c ) Transfers:  Sit to Stand: Additional time;Stand-by assistance; Moderate assistance (first trial mod A of 2; second trial post amb SBA only)  Stand to Sit: Stand-by assistance                       Balance:   Sitting: Intact  Standing: Impaired  Standing - Static: Fair  Standing - Dynamic : Fair  Ambulation/Gait Training:  Distance (ft): 40 Feet (ft)  Assistive Device: Walker, rolling;Gait belt  Ambulation - Level of Assistance: Minimal assistance;Stand-by assistance; Moderate assistance (initial min to mod A for 1st steps; progressed to SBA only)        Gait Abnormalities: Antalgic (on R)        Base of Support: Shift to left  Stance: Right decreased  Speed/Darlene: Slow  Step Length: Left shortened                Functional Measure:  Barthel Index:    Bathin  Bladder: 10  Bowels: 10  Groomin  Dressin  Feeding: 10  Mobility: 0 (limited by distance)  Stairs: 0 (pt declined testing)  Toilet Use: 5  Transfer (Bed to Chair and Back): 10  Total: 55       Barthel and G-code impairment scale:  Percentage of impairment CH  0% CI  1-19% CJ  20-39% CK  40-59% CL  60-79% CM  80-99% CN  100%   Barthel Score 0-100 100 99-80 79-60 59-40 20-39 1-19   0   Barthel Score 0-20 20 17-19 13-16 9-12 5-8 1-4 0      The Barthel ADL Index: Guidelines  1. The index should be used as a record of what a patient does, not as a record of what a patient could do. 2. The main aim is to establish degree of independence from any help, physical or verbal, however minor and for whatever reason. 3. The need for supervision renders the patient not independent. 4. A patient's performance should be established using the best available evidence. Asking the patient, friends/relatives and nurses are the usual sources, but direct observation and common sense are also important. However direct testing is not needed.   5. Usually the patient's performance over the preceding 24-48 hours is important, but occasionally longer periods will be relevant. 6. Middle categories imply that the patient supplies over 50 per cent of the effort. 7. Use of aids to be independent is allowed. Purnima Adair., Barthel, MAYRAW. (0072). Functional evaluation: the Barthel Index. [address] [facility] (14)2. CARITO Bal, Reg Plascencia., Jaguarervin Hooper., Fernanda [facility], [address] [place] [address] (1999). Measuring the change indisability after inpatient rehabilitation; comparison of the responsiveness of the Barthel Index and Functional [place] Measure. Journal of Neurology, Neurosurgery, and Psychiatry, 66(4), 559-899. CHRISTIANO Holliday.A, LEONCIO Liu, & Debo Winslow M.A. (2004.) Assessment of post-stroke quality of life in cost-effectiveness studies: The usefulness of the Barthel Index and the EuroQoL-5D. Quality of Life Research, 13, 427-43       In compliance with CMSs Claims Based Outcome Reporting, the following G-code set was chosen for this patient based on their primary functional limitation being treated: The outcome measure chosen to determine the severity of the functional limitation was the Barthel with a score of 55/100 which was correlated with the impairment scale. ? Mobility - Walking and Moving Around:     - CURRENT STATUS: CK - 40%-59% impaired, limited or restricted    - GOAL STATUS: CK - 40%-59% impaired, limited or restricted    - D/C STATUS:  CK - 40%-59% impaired, limited or restricted   Pain:  Pain Scale 1: Numeric (0 - 10)  Pain Intensity 1: 10, improved to 8/10 with amb  Pain Location 1: Knee  Pain Orientation 1: Right  Pain Description 1: Constant  Pain Intervention(s) 1: Therapeutic presence  Activity Tolerance:   See above narrative  Please refer to the flowsheet for vital signs taken during this treatment.   After treatment:   [x]         Patient left in no apparent distress sitting up in chair  []         Patient left in no apparent distress in bed  [x]         Call bell left within reach  [x]         Nursing notified  [x] Caregiver present  []         Bed alarm activated        Thank you for this referral.  Rah Shirley, PT   Time Calculation: 12 mins

## 2018-04-10 NOTE — ED PROVIDER NOTES
EMERGENCY DEPARTMENT HISTORY AND PHYSICAL EXAM      Date: 4/10/2018  Patient Name: Sascha Su     History of Presenting Illness     Chief Complaint   Patient presents with    Knee Pain     right knee pain with swelling x2 days with heat noted to knee; pt reports \"history of problems with his knee and he is having trouble standing and walking\" r/t knee pain        History Provided By: Patient    HPI: Sascha Su, 80 y.o. male with PMHx significant for CAD / HTN / Arthritis / AAA, presents to the ED with cc of constant right knee pain with swelling and increased warmth noted to knee x two days. Pt reports \"history of problems\" with the right knee, states that he is having difficulty with weight-bearing and ambulation. He denies any fever, chest pain, SOB, nausea, vomiting or diarrhea. PCP: Javier Hartmann MD    There are no other complaints, changes, or physical findings at this time. Current Facility-Administered Medications   Medication Dose Route Frequency Provider Last Rate Last Dose    lidocaine (PF) (XYLOCAINE) 10 mg/mL (1 %) injection 2 mL  2 mL SubCUTAneous ONCE Shala Downey        bupivacaine (PF) (MARCAINE) 0.5 % (5 mg/mL) injection 5 mg  1 mL Infiltration ONCE SANDEEP Babin         Current Outpatient Prescriptions   Medication Sig Dispense Refill    HYDROcodone-acetaminophen (NORCO) 5-325 mg per tablet Take 1 Tab by mouth every six (6) hours as needed for Pain for up to 3 days. Max Daily Amount: 4 Tabs. 10 Tab 0    furosemide (LASIX) 20 mg tablet Take 1 Tab by mouth every other day. Indications: Edema 45 Tab 0    metoprolol succinate (TOPROL-XL) 50 mg XL tablet Take 1 Tab by mouth daily. 90 Tab 0    QUEtiapine (SEROQUEL) 25 mg tablet Take 0.5 Tabs by mouth nightly. 45 Tab 0    memantine (NAMENDA) 10 mg tablet Take 1 Tab by mouth two (2) times a day. 10mg Twice daily 180 Tab 0    amLODIPine (NORVASC) 5 mg tablet Take 1 Tab by mouth daily.  30 Tab 3    allopurinol (ZYLOPRIM) 100 mg tablet Take 1 Tab by mouth daily. 30 Tab 4    tamsulosin (FLOMAX) 0.4 mg capsule Take 1 Cap by mouth daily. 27 Cap 5       Past History     Past Medical History:  Past Medical History:   Diagnosis Date    Abdominal aortic aneurysm (HCC)     Arthritis     CAD (coronary artery disease)     bypass    Hypertension     Other ill-defined conditions(799.89)     gout    Other ill-defined conditions(799.89)     high cholesterol       Past Surgical History:  Past Surgical History:   Procedure Laterality Date    ABDOMEN SURGERY PROC UNLISTED      hernia    CARDIAC SURG PROCEDURE UNLIST      bypass surgery    HX CATARACT REMOVAL      HX TONSILLECTOMY         Family History:  Family History   Problem Relation Age of Onset    Cancer Mother      liver    Hypertension Father     No Known Problems Sister     No Known Problems Brother     No Known Problems Sister      brain aneurysym       Social History:  Social History   Substance Use Topics    Smoking status: Former Smoker    Smokeless tobacco: Never Used    Alcohol use No       Allergies:  No Known Allergies      Review of Systems   Review of Systems   Constitutional: Negative. Negative for fever. HENT: Negative. Negative for rhinorrhea. Respiratory: Negative. Negative for cough and shortness of breath. Cardiovascular: Negative. Negative for chest pain. Gastrointestinal: Negative. Negative for abdominal pain, diarrhea, nausea and vomiting. Genitourinary: Negative. Negative for dysuria. Musculoskeletal: Negative for back pain. Positive for right knee pain   Skin: Negative. Negative for rash. Neurological: Negative. Negative for headaches. Psychiatric/Behavioral: Negative. Negative for agitation. All other systems reviewed and are negative. Physical Exam   Physical Exam   Constitutional: He is oriented to person, place, and time. He appears well-developed and well-nourished. No distress.    79 yo  male HENT:   Head: Normocephalic and atraumatic. Eyes: Conjunctivae are normal. Right eye exhibits no discharge. Left eye exhibits no discharge. Neck: Normal range of motion. Neck supple. Cardiovascular: Normal rate and intact distal pulses. Pulmonary/Chest: Effort normal.   Musculoskeletal:   R KNEE: Large joint effusion with mild, diffuse tenderness to palpation. ROM limited with pain. Ambulatory with limp. Distal NV intact. Cap refill < 2 sec. Neurological: He is alert and oriented to person, place, and time. Skin: Skin is warm and dry. He is not diaphoretic. Psychiatric: He has a normal mood and affect. His behavior is normal.   Nursing note and vitals reviewed. Diagnostic Study Results     Labs -   No results found for this or any previous visit (from the past 12 hour(s)). Radiologic Studies -   XR KNEE RT MIN 4 V   Final Result   EXAM:  XR KNEE RT MIN 4 V     INDICATION:   Trauma. Swelling. Trouble standing and walking.     COMPARISON: None.     FINDINGS: Four views of the right knee demonstrate large patellar joint  effusion. There is no osseous abnormality. There is a subcortical cyst along the  superior margin of the patella similar to the left knee. Vascular calcification  noted. .     IMPRESSION  IMPRESSION:        Large joint effusion. Medical Decision Making   I am the first provider for this patient. I reviewed the vital signs, available nursing notes, past medical history, past surgical history, family history and social history. Vital Signs-Reviewed the patient's vital signs. Patient Vitals for the past 12 hrs:   Temp Pulse Resp BP SpO2   04/10/18 1304 97.5 °F (36.4 °C) (!) 57 18 133/67 100 %     Records Reviewed: Nursing Notes, Old Medical Records and Previous Radiology Studies    Provider Notes (Medical Decision Making):   DDx: Sprain, Strain, Fracture, Arthritis, Effusion, DJD. ED Course:   Initial assessment performed.  The patients presenting problems have been discussed, and they are in agreement with the care plan formulated and outlined with them. I have encouraged them to ask questions as they arise throughout their visit.    2:09 PM  Case discussed with Dr. Bhargavi Pak who is agreement with the care plan. PROGRESS NOTE:  3:19 PM  Discussed case with Abdirizak Gee, Physical Therapist who will come see the patient. Written by Gio Box. Vanna, ED Scribe, as dictated by [employer].    Procedure Note - Arthrocentesis:   1500 PM  Performed by [employer] .   Immediately prior to the procedure, the patient was reevaluated and found suitable for the planned procedure and any planned medications. Immediately prior to the procedure a time out was called to verify the correct patient, procedure, equipment, staff, and marking as appropriate. Time out called and verified with Savita Arrington RN at 1500 PM.   Indication for procedure: Symptomatic relief of large effusion  Approach: medial  The site prepped with Betadine. Sterile field established. Anesthesia was obtained with 1 mLs of Lidocaine 1% without epinephrine. Right knee joint was entered, using a 18 gauge needle, and 60 cc's of straw colored/clear fluid was withdrawn and sent for cell count & diff, crystals, gram stain and culture. Right knee was injected with Lidocaine 1% without epi 1 mL, Bupivacaine 0.5% 1 mL and Kenalog 40 mg.   Estimated blood loss: 0  The procedure took 1-15 minutes, and pt tolerated well. Critical Care Time:   0 minutes    Disposition:  DISCHARGE NOTE:  3:19 PM  The patient's results have been reviewed with family and/or caregiver. They verbally convey their understanding and agreement of the patient's signs, symptoms, diagnosis, treatment, and prognosis. They additionally agree to follow up as recommended in the discharge instructions or to return to the Emergency Room should the patient's condition change prior to their follow-up appointment.  The family and/or caregiver verbally agrees with the care-plan and all of their questions have been answered. The discharge instructions have also been provided to the them along with educational information regarding the patient's diagnosis and a list of reasons why the patient would want to return to the ER prior to their follow-up appointment should their condition change. Written by Deandra Dumont ED Scribe, as dictated by Jacklyn Rocha.    PLAN:  1. Current Discharge Medication List      START taking these medications    Details   HYDROcodone-acetaminophen (NORCO) 5-325 mg per tablet Take 1 Tab by mouth every six (6) hours as needed for Pain for up to 3 days. Max Daily Amount: 4 Tabs. Qty: 10 Tab, Refills: 0    Associated Diagnoses: Knee effusion, right; Acute pain of right knee; Arthritis of right knee         CONTINUE these medications which have NOT CHANGED    Details   furosemide (LASIX) 20 mg tablet Take 1 Tab by mouth every other day. Indications: Edema  Qty: 45 Tab, Refills: 0      metoprolol succinate (TOPROL-XL) 50 mg XL tablet Take 1 Tab by mouth daily. Qty: 90 Tab, Refills: 0      QUEtiapine (SEROQUEL) 25 mg tablet Take 0.5 Tabs by mouth nightly. Qty: 45 Tab, Refills: 0      memantine (NAMENDA) 10 mg tablet Take 1 Tab by mouth two (2) times a day. 10mg Twice daily  Qty: 180 Tab, Refills: 0    Associated Diagnoses: Vascular dementia without behavioral disturbance      amLODIPine (NORVASC) 5 mg tablet Take 1 Tab by mouth daily. Qty: 30 Tab, Refills: 3    Associated Diagnoses: Hypertension goal BP (blood pressure) < 130/80      allopurinol (ZYLOPRIM) 100 mg tablet Take 1 Tab by mouth daily. Qty: 30 Tab, Refills: 4    Associated Diagnoses: Gouty arthritis      tamsulosin (FLOMAX) 0.4 mg capsule Take 1 Cap by mouth daily. Qty: 30 Cap, Refills: 5    Associated Diagnoses: BPH without urinary obstruction           2.    Follow-up Information     Follow up With Details Comments Contact Johan Hartley MD In 1 week  [facility]  [address]      Nirmala Siddiqi MD In 1 week  [facility]  [address]  [facility]  [phone]        3. Narcotic precautions as advised. Return to ED if worse     Diagnosis     Clinical Impression:   1. Knee effusion, right    2. Acute pain of right knee    3. Arthritis of right knee        Attestations: This note is prepared by John Prabhakar, acting as Scribe for Evelia Abrams.    NNEKA Araujo: The scribe's documentation has been prepared under my direction and personally reviewed by me in its entirety. I confirm that the note above accurately reflects all work, treatment, procedures, and medical decision making performed by me.

## 2018-04-10 NOTE — DISCHARGE INSTRUCTIONS

## 2018-04-10 NOTE — ED NOTES
Received patient to exam room, sitting in a chair in a position of comfort, call bell within reach; pt reports right knee pain and swelling x 4 days, denies trauma

## 2018-04-10 NOTE — ED NOTES
Discharge instructions reviewed with pt and son and copy given by [facility]

## 2018-04-18 DIAGNOSIS — F01.50 VASCULAR DEMENTIA WITHOUT BEHAVIORAL DISTURBANCE (HCC): ICD-10-CM

## 2018-04-18 NOTE — TELEPHONE ENCOUNTER
----- Message from Lucinda Adams sent at 4/18/2018 12:11 PM EDT -----  Regarding: Dr. Canseco [facility] [address]: [phone]  Mr. Raquel Quintero, daughter-in-law  request to speak with MAYRA Nino in regards to fax sent from [address], [address]. 4/17/2018  in regards to prescriptions refill.

## 2018-04-20 RX ORDER — QUETIAPINE FUMARATE 25 MG/1
12.5 TABLET, FILM COATED ORAL
Qty: 45 TAB | Refills: 1 | Status: SHIPPED | OUTPATIENT
Start: 2018-04-20 | End: 2019-02-02

## 2018-04-20 RX ORDER — METOPROLOL SUCCINATE 50 MG/1
50 TABLET, EXTENDED RELEASE ORAL DAILY
Qty: 90 TAB | Refills: 1 | Status: SHIPPED | OUTPATIENT
Start: 2018-04-20 | End: 2018-10-23 | Stop reason: SDUPTHER

## 2018-04-20 RX ORDER — FUROSEMIDE 20 MG/1
20 TABLET ORAL EVERY OTHER DAY
Qty: 45 TAB | Refills: 1 | Status: SHIPPED | OUTPATIENT
Start: 2018-04-20 | End: 2018-12-04

## 2018-04-20 RX ORDER — MEMANTINE HYDROCHLORIDE 10 MG/1
10 TABLET ORAL 2 TIMES DAILY
Qty: 180 TAB | Refills: 1 | Status: SHIPPED | OUTPATIENT
Start: 2018-04-20 | End: 2019-03-11 | Stop reason: SDUPTHER

## 2018-04-24 LAB
BACTERIA SPEC CULT: NORMAL
BACTERIA SPEC CULT: NORMAL
GRAM STN SPEC: NORMAL
GRAM STN SPEC: NORMAL
SERVICE CMNT-IMP: NORMAL

## 2018-05-23 DIAGNOSIS — I10 HYPERTENSION GOAL BP (BLOOD PRESSURE) < 130/80: ICD-10-CM

## 2018-05-23 DIAGNOSIS — M10.9 GOUTY ARTHRITIS: ICD-10-CM

## 2018-05-24 ENCOUNTER — TELEPHONE (OUTPATIENT)
Dept: FAMILY MEDICINE CLINIC | Age: 83
End: 2018-05-24

## 2018-05-24 NOTE — TELEPHONE ENCOUNTER
Called the [facility] pharmacy, patient is requesting a 90 day supply of all required medication.

## 2018-05-25 RX ORDER — ALLOPURINOL 100 MG/1
100 TABLET ORAL DAILY
Qty: 90 TAB | Refills: 1 | Status: SHIPPED | OUTPATIENT
Start: 2018-05-25 | End: 2018-09-21 | Stop reason: SDUPTHER

## 2018-05-25 RX ORDER — AMLODIPINE BESYLATE 5 MG/1
5 TABLET ORAL DAILY
Qty: 90 TAB | Refills: 1 | Status: SHIPPED | OUTPATIENT
Start: 2018-05-25 | End: 2018-10-03 | Stop reason: SDUPTHER

## 2018-09-16 DIAGNOSIS — N40.0 BPH WITHOUT URINARY OBSTRUCTION: ICD-10-CM

## 2018-09-17 RX ORDER — TAMSULOSIN HYDROCHLORIDE 0.4 MG/1
CAPSULE ORAL
Qty: 90 CAP | Refills: 0 | Status: SHIPPED | OUTPATIENT
Start: 2018-09-17 | End: 2018-10-23 | Stop reason: SDUPTHER

## 2018-09-21 DIAGNOSIS — M10.9 GOUTY ARTHRITIS: ICD-10-CM

## 2018-09-21 DIAGNOSIS — I10 HYPERTENSION GOAL BP (BLOOD PRESSURE) < 130/80: ICD-10-CM

## 2018-09-24 RX ORDER — ALLOPURINOL 100 MG/1
100 TABLET ORAL DAILY
Qty: 90 TAB | Refills: 0 | Status: SHIPPED | OUTPATIENT
Start: 2018-09-24 | End: 2019-03-11 | Stop reason: SDUPTHER

## 2018-10-05 RX ORDER — AMLODIPINE BESYLATE 5 MG/1
5 TABLET ORAL DAILY
Qty: 90 TAB | Refills: 0 | Status: SHIPPED | OUTPATIENT
Start: 2018-10-05 | End: 2019-01-29 | Stop reason: SDUPTHER

## 2018-10-23 DIAGNOSIS — N40.0 BPH WITHOUT URINARY OBSTRUCTION: ICD-10-CM

## 2018-10-23 RX ORDER — TAMSULOSIN HYDROCHLORIDE 0.4 MG/1
CAPSULE ORAL
Qty: 90 CAP | Refills: 0 | Status: SHIPPED | OUTPATIENT
Start: 2018-10-23 | End: 2019-03-11 | Stop reason: SDUPTHER

## 2018-10-23 RX ORDER — METOPROLOL SUCCINATE 50 MG/1
TABLET, EXTENDED RELEASE ORAL
Qty: 90 TAB | Refills: 1 | Status: SHIPPED | OUTPATIENT
Start: 2018-10-23 | End: 2019-02-02

## 2018-11-30 ENCOUNTER — HOSPITAL ENCOUNTER (INPATIENT)
Age: 83
LOS: 4 days | Discharge: NURSING, REHAB OR LONG-TERM CARE | DRG: 682 | End: 2018-12-04
Attending: EMERGENCY MEDICINE | Admitting: INTERNAL MEDICINE
Payer: MEDICARE

## 2018-11-30 ENCOUNTER — APPOINTMENT (OUTPATIENT)
Dept: GENERAL RADIOLOGY | Age: 83
DRG: 682 | End: 2018-11-30
Attending: EMERGENCY MEDICINE
Payer: MEDICARE

## 2018-11-30 ENCOUNTER — APPOINTMENT (OUTPATIENT)
Dept: CT IMAGING | Age: 83
DRG: 682 | End: 2018-11-30
Attending: EMERGENCY MEDICINE
Payer: MEDICARE

## 2018-11-30 DIAGNOSIS — I10 ESSENTIAL HYPERTENSION: ICD-10-CM

## 2018-11-30 DIAGNOSIS — E03.9 ACQUIRED HYPOTHYROIDISM: ICD-10-CM

## 2018-11-30 DIAGNOSIS — F02.80 LATE ONSET ALZHEIMER'S DISEASE WITHOUT BEHAVIORAL DISTURBANCE (HCC): ICD-10-CM

## 2018-11-30 DIAGNOSIS — M1A.09X1 IDIOPATHIC CHRONIC GOUT OF MULTIPLE SITES WITH TOPHUS: ICD-10-CM

## 2018-11-30 DIAGNOSIS — G30.1 LATE ONSET ALZHEIMER'S DISEASE WITHOUT BEHAVIORAL DISTURBANCE (HCC): ICD-10-CM

## 2018-11-30 DIAGNOSIS — R41.82 ALTERED MENTAL STATUS, UNSPECIFIED ALTERED MENTAL STATUS TYPE: Primary | ICD-10-CM

## 2018-11-30 LAB
ALBUMIN SERPL-MCNC: 3.8 G/DL (ref 3.5–5)
ALBUMIN/GLOB SERPL: 1.1 {RATIO} (ref 1.1–2.2)
ALP SERPL-CCNC: 83 U/L (ref 45–117)
ALT SERPL-CCNC: 21 U/L (ref 12–78)
ANION GAP SERPL CALC-SCNC: 7 MMOL/L (ref 5–15)
AST SERPL-CCNC: 46 U/L (ref 15–37)
BASOPHILS # BLD: 0 K/UL (ref 0–0.1)
BASOPHILS NFR BLD: 0 % (ref 0–1)
BILIRUB SERPL-MCNC: 1.1 MG/DL (ref 0.2–1)
BUN SERPL-MCNC: 42 MG/DL (ref 6–20)
BUN/CREAT SERPL: 17 (ref 12–20)
CALCIUM SERPL-MCNC: 8.8 MG/DL (ref 8.5–10.1)
CHLORIDE SERPL-SCNC: 106 MMOL/L (ref 97–108)
CK SERPL-CCNC: 998 U/L (ref 39–308)
CO2 SERPL-SCNC: 25 MMOL/L (ref 21–32)
CREAT SERPL-MCNC: 2.49 MG/DL (ref 0.7–1.3)
DIFFERENTIAL METHOD BLD: ABNORMAL
EOSINOPHIL # BLD: 0.1 K/UL (ref 0–0.4)
EOSINOPHIL NFR BLD: 1 % (ref 0–7)
ERYTHROCYTE [DISTWIDTH] IN BLOOD BY AUTOMATED COUNT: 14.1 % (ref 11.5–14.5)
GLOBULIN SER CALC-MCNC: 3.6 G/DL (ref 2–4)
GLUCOSE SERPL-MCNC: 88 MG/DL (ref 65–100)
HCT VFR BLD AUTO: 34.5 % (ref 36.6–50.3)
HGB BLD-MCNC: 11.7 G/DL (ref 12.1–17)
IMM GRANULOCYTES # BLD: 0 K/UL (ref 0–0.04)
IMM GRANULOCYTES NFR BLD AUTO: 1 % (ref 0–0.5)
LYMPHOCYTES # BLD: 1.1 K/UL (ref 0.8–3.5)
LYMPHOCYTES NFR BLD: 14 % (ref 12–49)
MAGNESIUM SERPL-MCNC: 2.4 MG/DL (ref 1.6–2.4)
MCH RBC QN AUTO: 34.9 PG (ref 26–34)
MCHC RBC AUTO-ENTMCNC: 33.9 G/DL (ref 30–36.5)
MCV RBC AUTO: 103 FL (ref 80–99)
MONOCYTES # BLD: 0.9 K/UL (ref 0–1)
MONOCYTES NFR BLD: 11 % (ref 5–13)
NEUTS SEG # BLD: 5.8 K/UL (ref 1.8–8)
NEUTS SEG NFR BLD: 73 % (ref 32–75)
NRBC # BLD: 0 K/UL (ref 0–0.01)
NRBC BLD-RTO: 0 PER 100 WBC
PLATELET # BLD AUTO: 168 K/UL (ref 150–400)
PMV BLD AUTO: 10 FL (ref 8.9–12.9)
POTASSIUM SERPL-SCNC: 3.9 MMOL/L (ref 3.5–5.1)
PROT SERPL-MCNC: 7.4 G/DL (ref 6.4–8.2)
RBC # BLD AUTO: 3.35 M/UL (ref 4.1–5.7)
SODIUM SERPL-SCNC: 138 MMOL/L (ref 136–145)
TROPONIN I SERPL-MCNC: <0.05 NG/ML
WBC # BLD AUTO: 8 K/UL (ref 4.1–11.1)

## 2018-11-30 PROCEDURE — 74011250636 HC RX REV CODE- 250/636: Performed by: EMERGENCY MEDICINE

## 2018-11-30 PROCEDURE — 83735 ASSAY OF MAGNESIUM: CPT

## 2018-11-30 PROCEDURE — 99285 EMERGENCY DEPT VISIT HI MDM: CPT

## 2018-11-30 PROCEDURE — 85025 COMPLETE CBC W/AUTO DIFF WBC: CPT

## 2018-11-30 PROCEDURE — 36415 COLL VENOUS BLD VENIPUNCTURE: CPT

## 2018-11-30 PROCEDURE — 71045 X-RAY EXAM CHEST 1 VIEW: CPT

## 2018-11-30 PROCEDURE — 80053 COMPREHEN METABOLIC PANEL: CPT

## 2018-11-30 PROCEDURE — 93005 ELECTROCARDIOGRAM TRACING: CPT

## 2018-11-30 PROCEDURE — 84484 ASSAY OF TROPONIN QUANT: CPT

## 2018-11-30 PROCEDURE — 96360 HYDRATION IV INFUSION INIT: CPT

## 2018-11-30 PROCEDURE — 72125 CT NECK SPINE W/O DYE: CPT

## 2018-11-30 PROCEDURE — 82550 ASSAY OF CK (CPK): CPT

## 2018-11-30 PROCEDURE — 65660000000 HC RM CCU STEPDOWN

## 2018-11-30 PROCEDURE — 96361 HYDRATE IV INFUSION ADD-ON: CPT

## 2018-11-30 PROCEDURE — 70450 CT HEAD/BRAIN W/O DYE: CPT

## 2018-11-30 RX ORDER — SODIUM CHLORIDE, SODIUM LACTATE, POTASSIUM CHLORIDE, CALCIUM CHLORIDE 600; 310; 30; 20 MG/100ML; MG/100ML; MG/100ML; MG/100ML
1000 INJECTION, SOLUTION INTRAVENOUS CONTINUOUS
Status: DISCONTINUED | OUTPATIENT
Start: 2018-11-30 | End: 2018-12-04 | Stop reason: HOSPADM

## 2018-11-30 RX ADMIN — SODIUM CHLORIDE, SODIUM LACTATE, POTASSIUM CHLORIDE, AND CALCIUM CHLORIDE 1000 ML: 600; 310; 30; 20 INJECTION, SOLUTION INTRAVENOUS at 21:10

## 2018-11-30 NOTE — ED PROVIDER NOTES
EMERGENCY DEPARTMENT HISTORY AND PHYSICAL EXAM 
 
 
Date: 11/30/2018 Patient Name: Red Dennison History of Presenting Illness Chief Complaint Patient presents with  Altered mental status History Provided By: Patient and EMS 
 
HPI: Red Dennison, 80 y.o. male with PMHx significant for CAD, HTN, gout, arthritis, AAA, presents via EMS to the ED with cc of confusion and fall per pt's family members telling EMS, onset today. EMS reports family members found pt alone, on ground, at pt's apartment. EMS reports BGL level was 102, initial blood pressure reading of 155/80, and was negative on the stroke scale. Pt reports that he has had diarrhea for past ~3 days, and states sxs onset s/p eating a meal.  Pt reports he has had trouble walking for past several days and feels weak all over. Pt denies fever, nausea, vomiting, or current pain. No family present and unable to reach family by phone for further history. Pt hx is limited due to altered mental status There are no other complaints, changes, or physical findings at this time. PCP: Dilan Jennings MD 
 
Current Facility-Administered Medications Medication Dose Route Frequency Provider Last Rate Last Dose  lactated Ringers infusion 1,000 mL  1,000 mL IntraVENous CONTINUOUS Jennifer Jackson MD   1,000 mL at 11/30/18 2110 Current Outpatient Medications Medication Sig Dispense Refill  metoprolol succinate (TOPROL-XL) 50 mg XL tablet TAKE 1 TABLET BY MOUTH DAILY. 90 Tab 1  
 tamsulosin (FLOMAX) 0.4 mg capsule TAKE 1 CAPSULE BY MOUTH EVERY DAY 90 Cap 0  
 amLODIPine (NORVASC) 5 mg tablet Take 1 Tab by mouth daily. 90 Tab 0  
 allopurinol (ZYLOPRIM) 100 mg tablet Take 1 Tab by mouth daily. 90 Tab 0  
 LASIX 20 mg tablet Take 1 Tab by mouth every other day. Indications: Edema 45 Tab 1  
 memantine (NAMENDA) 10 mg tablet Take 1 Tab by mouth two (2) times a day.  10mg Twice daily 180 Tab 1  
  QUEtiapine (SEROQUEL) 25 mg tablet Take 0.5 Tabs by mouth nightly. 45 Tab 1  
 furosemide (LASIX) 20 mg tablet TAKE 1 TABLET BY MOUTH EVERY OTHER DAY 45 Tab 3  
 QUEtiapine (SEROQUEL) 25 mg tablet TAKE 1/2 TABLET ORALLY NIGHTLY 45 Tab 3 Past History Past Medical History: 
Past Medical History:  
Diagnosis Date  Abdominal aortic aneurysm ([address]  Arthritis  CAD (coronary artery disease) bypass  Hypertension  Other ill-defined conditions(799.89)   
 gout  Other ill-defined conditions(799.89)   
 high cholesterol Past Surgical History: 
Past Surgical History:  
Procedure Laterality Date  ABDOMEN SURGERY PROC UNLISTED    
 hernia  CARDIAC SURG PROCEDURE UNLIST    
 bypass surgery  HX CATARACT REMOVAL    
 HX TONSILLECTOMY Family History: 
Family History Problem Relation Age of Onset  Cancer Mother   
     liver  Hypertension Father  No Known Problems Sister  No Known Problems Brother  No Known Problems Sister   
     brain aneurysym Social History: 
Social History Tobacco Use  Smoking status: Former Smoker  Smokeless tobacco: Never Used Substance Use Topics  Alcohol use: No  
 Drug use: No  
 
 
Allergies: 
No Known Allergies Review of Systems Review of Systems Unable to perform ROS: Mental status change Physical Exam  
Physical Exam  
 
GENERAL: alert, no acute distress EYES: PEERL, No injection, discharge or icterus. HENT: Mucous membranes pink and moist. 
NECK: Supple LUNGS: Airway patent. Non-labored respirations. Breath sounds clear with good air entry bilaterally. HEART: Regular rate and rhythm. No peripheral edema ABDOMEN: Non-distended and non-tender, without guarding or rebound. SKIN:  warm, dry MSK/ EXTREMITIES: Without swelling, tenderness or deformity, symmetric with normal ROM 
NEUROLOGICAL: alert, disoriented to place and timee, CN II-XII grossly intact, strength 5/5 bilateral upper and lower extremities, sensation intact throughout to light touch, no dysmetria  noted Diagnostic Study Results Labs - Recent Results (from the past 12 hour(s)) CBC WITH AUTOMATED DIFF Collection Time: 11/30/18  4:55 PM  
Result Value Ref Range WBC 8.0 4.1 - 11.1 K/uL  
 RBC 3.35 (L) 4.10 - 5.70 M/uL  
 HGB 11.7 (L) 12.1 - 17.0 g/dL HCT 34.5 (L) 36.6 - 50.3 % .0 (H) 80.0 - 99.0 FL  
 MCH 34.9 (H) 26.0 - 34.0 PG  
 MCHC 33.9 30.0 - 36.5 g/dL  
 RDW 14.1 11.5 - 14.5 % PLATELET 439 923 - 255 K/uL MPV 10.0 8.9 - 12.9 FL  
 NRBC 0.0 0  WBC ABSOLUTE NRBC 0.00 0.00 - 0.01 K/uL NEUTROPHILS 73 32 - 75 % LYMPHOCYTES 14 12 - 49 % MONOCYTES 11 5 - 13 % EOSINOPHILS 1 0 - 7 % BASOPHILS 0 0 - 1 % IMMATURE GRANULOCYTES 1 (H) 0.0 - 0.5 % ABS. NEUTROPHILS 5.8 1.8 - 8.0 K/UL  
 ABS. LYMPHOCYTES 1.1 0.8 - 3.5 K/UL  
 ABS. MONOCYTES 0.9 0.0 - 1.0 K/UL  
 ABS. EOSINOPHILS 0.1 0.0 - 0.4 K/UL  
 ABS. BASOPHILS 0.0 0.0 - 0.1 K/UL  
 ABS. IMM. GRANS. 0.0 0.00 - 0.04 K/UL  
 DF AUTOMATED METABOLIC PANEL, COMPREHENSIVE Collection Time: 11/30/18  4:55 PM  
Result Value Ref Range Sodium 138 136 - 145 mmol/L Potassium 3.9 3.5 - 5.1 mmol/L Chloride 106 97 - 108 mmol/L  
 CO2 25 21 - 32 mmol/L Anion gap 7 5 - 15 mmol/L Glucose 88 65 - 100 mg/dL BUN 42 (H) 6 - 20 MG/DL Creatinine 2.49 (H) 0.70 - 1.30 MG/DL  
 BUN/Creatinine ratio 17 12 - 20 GFR est AA 30 (L) >60 ml/min/1.73m2 GFR est non-AA 25 (L) >60 ml/min/1.73m2 Calcium 8.8 8.5 - 10.1 MG/DL Bilirubin, total 1.1 (H) 0.2 - 1.0 MG/DL  
 ALT (SGPT) 21 12 - 78 U/L  
 AST (SGOT) 46 (H) 15 - 37 U/L Alk. phosphatase 83 45 - 117 U/L Protein, total 7.4 6.4 - 8.2 g/dL Albumin 3.8 3.5 - 5.0 g/dL Globulin 3.6 2.0 - 4.0 g/dL A-G Ratio 1.1 1.1 - 2.2 MAGNESIUM Collection Time: 11/30/18  4:55 PM  
Result Value Ref Range Magnesium 2.4 1.6 - 2.4 mg/dL TROPONIN I Collection Time: 11/30/18  4:55 PM  
Result Value Ref Range Troponin-I, Qt. <0.05 <0.05 ng/mL CK Collection Time: 11/30/18  4:55 PM  
Result Value Ref Range  (H) 39 - 308 U/L  
EKG, 12 LEAD, INITIAL Collection Time: 11/30/18  5:05 PM  
Result Value Ref Range Ventricular Rate 67 BPM  
 Atrial Rate 67 BPM  
 P-R Interval 116 ms  
 QRS Duration 124 ms Q-T Interval 448 ms QTC Calculation (Bezet) 473 ms Calculated P Axis 25 degrees Calculated R Axis -37 degrees Calculated T Axis 59 degrees Diagnosis Normal sinus rhythm Left axis deviation Right bundle branch block Possible Inferior infarct (cited on or before 30-NOV-2018) When compared with ECG of 30-JAN-2018 10:54, No significant change was found Radiologic Studies -  
XR CHEST PORT Final Result CT SPINE CERV WO CONT Final Result CT HEAD WO CONT Final Result CT Results  (Last 48 hours)  
          
 11/30/18 1632  CT HEAD WO CONT Final result Impression:  IMPRESSION: No acute process or change compared to the prior exam.  
   
   
  
 Narrative:  EXAM:  CT HEAD WO CONT INDICATION:   Status post fall with altered mental status COMPARISON: 11/25/2017. CONTRAST:  None. TECHNIQUE: Unenhanced CT of the head was performed using 5 mm images. Brain and  
bone windows were generated. CT dose reduction was achieved through use of a  
standardized protocol tailored for this examination and automatic exposure  
control for dose modulation. FINDINGS:  
Generalized atrophy is noted. Small vessel ischemic changes are stable compared  
to the prior exam. There is no intracranial hemorrhage, extra-axial collection,  
mass, mass effect or midline shift. The basilar cisterns are open. No acute  
infarct is identified. The bone windows demonstrate no abnormalities.  The  
 visualized portions of the paranasal sinuses and mastoid air cells are clear. Vascular calcification is evident. 11/30/18 1632  CT SPINE CERV WO CONT Final result Impression:  IMPRESSION:  
Spondylitic changes without acute abnormality. Narrative:  EXAM:  CT CERVICAL SPINE WITHOUT CONTRAST INDICATION:   fall. Altered mental status COMPARISON: None. CONTRAST:  None. TECHNIQUE: Multislice helical CT of the cervical spine was performed without  
intravenous contrast administration. Sagittal and coronal reconstructions were  
generated. CT dose reduction was achieved through use of a standardized  
protocol tailored for this examination and automatic exposure control for dose  
modulation. FINDINGS:  
   
The alignment is within normal limits. There is no fracture or subluxation. The  
odontoid process is intact. The craniocervical junction is within normal limits. The prevertebral soft tissues are within normal limits. Spondylosis is noted at C4-5, C5-6, and C6-7 with posterior osteophyte/disc bulge complexes causing  
mild-to-moderate spinal stenosis. Bilateral neural foraminal narrowing is noted  
at these levels secondary to uncovertebral osteophyte formation. CXR Results  (Last 48 hours)  
          
 11/30/18 1721  XR CHEST PORT Final result Impression:  Impression: No acute process. Stable cardiomegaly. Large hiatal hernia. Narrative: Indication: Chest pain Comparison: 1/30/2018 Portable exam of the chest obtained at 1655 demonstrates stable cardiomegaly. Large hiatal hernia is again noted. The patient is status post CABG procedure. There is no acute process in the lung fields. The osseous structures are  
unremarkable. Medical Decision Making I am the first provider for this patient.  
 
I reviewed the vital signs, available nursing notes, past medical history, past surgical history, family history and social history. Vital Signs-Reviewed the patient's vital signs. Patient Vitals for the past 12 hrs: 
 Temp Pulse Resp BP SpO2  
11/30/18 2200  70 19 107/82 (!) 86 % 11/30/18 2100  80 19 152/78 (!) 75 % 11/30/18 1549 98.1 °F (36.7 °C) 61 16 161/74 99 % Pulse Oximetry Analysis - 99% on RA Cardiac Monitor:  
Rate: 61 bpm 
Rhythm: Normal Sinus Rhythm 1549 EKG interpretation: (Preliminary) 1705 Rhythm: normal sinus rhythm; and regular . Rate (approx.): 67; Axis: left axis deviation; NH interval: normal; QRS interval: normal ; ST/T wave: normal; Other findings: right bundle branch block. Written by Amarjit Kent, ED Scribe, as dictated by Albert Song. Leroy Vidal MD. Records Reviewed: Nursing Notes, Old Medical Records and Ambulance Run Sheet Provider Notes (Medical Decision Making): On presentation the patient is well appearing, in no acute distress with reassurnig vital signs. Based on the history and exam the differential diagnosis for this patient includes fall, ICH, stroke dehydration, metabolic derangement, and infection. Workup revealed possible NAVEED with elevated CK. atient given IV lfuids in the ED. No signs of infection on  Exam or workup. Did consider stroke, however his exam was non-focal, head CT negative and he would not be a candidate for intervention given his presentation so dont feel emergent MRI or neuro consult is indicated. Will admit for further workup of his AMS. ED Course:  
Initial assessment performed. The patients presenting problems have been discussed, and they are in agreement with the care plan formulated and outlined with them. I have encouraged them to ask questions as they arise throughout their visit. Consult Note: 
10:53 PM 
Isaiah Vidal MD spoke with Aminata Silver MD, Specialty: Hospitalist 
Discussed pt's hx, disposition, and available diagnostic and imaging results. Reviewed care plans. Consultant agrees with plans as outlined. Christina Flannery MD will admit pt. Disposition: 
Admit Note: 
10:57 PM 
Pt is being admitted by Christina Flannery MD. The results of their tests and reason(s) for their admission have been discussed with pt and/or available family. They convey agreement and understanding for the need to be admitted and for admission diagnosis. Return to ED if worse Diagnosis Clinical Impression: 1. AMS 2. NAVEED Attestations: This note is prepared by Di Kent, acting as Scribe for [employer]. Madonna Connors MD 
 
Ed Mahad Connors MD: The scribe's documentation has been prepared under my direction and personally reviewed by me in its entirety. I confirm that the note above accurately reflects all work, treatment, procedures, and medical decision making performed by me.

## 2018-12-01 ENCOUNTER — APPOINTMENT (OUTPATIENT)
Dept: MRI IMAGING | Age: 83
DRG: 682 | End: 2018-12-01
Attending: INTERNAL MEDICINE
Payer: MEDICARE

## 2018-12-01 LAB
ALBUMIN SERPL-MCNC: 3.2 G/DL (ref 3.5–5)
ALBUMIN/GLOB SERPL: 0.9 {RATIO} (ref 1.1–2.2)
ALP SERPL-CCNC: 74 U/L (ref 45–117)
ALT SERPL-CCNC: 19 U/L (ref 12–78)
AMMONIA PLAS-SCNC: 12 UMOL/L
ANION GAP SERPL CALC-SCNC: 9 MMOL/L (ref 5–15)
AST SERPL-CCNC: 45 U/L (ref 15–37)
ATRIAL RATE: 67 BPM
BASOPHILS # BLD: 0 K/UL (ref 0–0.1)
BASOPHILS NFR BLD: 1 % (ref 0–1)
BILIRUB SERPL-MCNC: 1 MG/DL (ref 0.2–1)
BUN SERPL-MCNC: 38 MG/DL (ref 6–20)
BUN/CREAT SERPL: 18 (ref 12–20)
CALCIUM SERPL-MCNC: 8.1 MG/DL (ref 8.5–10.1)
CALCULATED P AXIS, ECG09: 25 DEGREES
CALCULATED R AXIS, ECG10: -37 DEGREES
CALCULATED T AXIS, ECG11: 59 DEGREES
CHLORIDE SERPL-SCNC: 110 MMOL/L (ref 97–108)
CO2 SERPL-SCNC: 23 MMOL/L (ref 21–32)
CREAT SERPL-MCNC: 2.09 MG/DL (ref 0.7–1.3)
DIAGNOSIS, 93000: NORMAL
DIFFERENTIAL METHOD BLD: ABNORMAL
EOSINOPHIL # BLD: 0.1 K/UL (ref 0–0.4)
EOSINOPHIL NFR BLD: 2 % (ref 0–7)
ERYTHROCYTE [DISTWIDTH] IN BLOOD BY AUTOMATED COUNT: 14.1 % (ref 11.5–14.5)
GLOBULIN SER CALC-MCNC: 3.4 G/DL (ref 2–4)
GLUCOSE SERPL-MCNC: 72 MG/DL (ref 65–100)
HCT VFR BLD AUTO: 33.3 % (ref 36.6–50.3)
HGB BLD-MCNC: 11.1 G/DL (ref 12.1–17)
IMM GRANULOCYTES # BLD: 0 K/UL (ref 0–0.04)
IMM GRANULOCYTES NFR BLD AUTO: 0 % (ref 0–0.5)
LYMPHOCYTES # BLD: 1.2 K/UL (ref 0.8–3.5)
LYMPHOCYTES NFR BLD: 16 % (ref 12–49)
MCH RBC QN AUTO: 34.6 PG (ref 26–34)
MCHC RBC AUTO-ENTMCNC: 33.3 G/DL (ref 30–36.5)
MCV RBC AUTO: 103.7 FL (ref 80–99)
MONOCYTES # BLD: 0.8 K/UL (ref 0–1)
MONOCYTES NFR BLD: 12 % (ref 5–13)
NEUTS SEG # BLD: 4.9 K/UL (ref 1.8–8)
NEUTS SEG NFR BLD: 70 % (ref 32–75)
NRBC # BLD: 0 K/UL (ref 0–0.01)
NRBC BLD-RTO: 0 PER 100 WBC
P-R INTERVAL, ECG05: 116 MS
PLATELET # BLD AUTO: 148 K/UL (ref 150–400)
PMV BLD AUTO: 10.1 FL (ref 8.9–12.9)
POTASSIUM SERPL-SCNC: 3.7 MMOL/L (ref 3.5–5.1)
PROT SERPL-MCNC: 6.6 G/DL (ref 6.4–8.2)
Q-T INTERVAL, ECG07: 448 MS
QRS DURATION, ECG06: 124 MS
QTC CALCULATION (BEZET), ECG08: 473 MS
RBC # BLD AUTO: 3.21 M/UL (ref 4.1–5.7)
SODIUM SERPL-SCNC: 142 MMOL/L (ref 136–145)
TROPONIN I SERPL-MCNC: <0.05 NG/ML
TROPONIN I SERPL-MCNC: <0.05 NG/ML
TSH SERPL DL<=0.05 MIU/L-ACNC: 84 UIU/ML (ref 0.36–3.74)
VENTRICULAR RATE, ECG03: 67 BPM
VIT B12 SERPL-MCNC: 659 PG/ML (ref 193–986)
WBC # BLD AUTO: 7.1 K/UL (ref 4.1–11.1)

## 2018-12-01 PROCEDURE — 85025 COMPLETE CBC W/AUTO DIFF WBC: CPT

## 2018-12-01 PROCEDURE — G8979 MOBILITY GOAL STATUS: HCPCS

## 2018-12-01 PROCEDURE — 82607 VITAMIN B-12: CPT

## 2018-12-01 PROCEDURE — 74011250636 HC RX REV CODE- 250/636: Performed by: INTERNAL MEDICINE

## 2018-12-01 PROCEDURE — 95816 EEG AWAKE AND DROWSY: CPT | Performed by: PSYCHIATRY & NEUROLOGY

## 2018-12-01 PROCEDURE — 84443 ASSAY THYROID STIM HORMONE: CPT

## 2018-12-01 PROCEDURE — 36415 COLL VENOUS BLD VENIPUNCTURE: CPT

## 2018-12-01 PROCEDURE — 97165 OT EVAL LOW COMPLEX 30 MIN: CPT

## 2018-12-01 PROCEDURE — 97116 GAIT TRAINING THERAPY: CPT

## 2018-12-01 PROCEDURE — 97535 SELF CARE MNGMENT TRAINING: CPT

## 2018-12-01 PROCEDURE — 82140 ASSAY OF AMMONIA: CPT

## 2018-12-01 PROCEDURE — G8978 MOBILITY CURRENT STATUS: HCPCS

## 2018-12-01 PROCEDURE — 80053 COMPREHEN METABOLIC PANEL: CPT

## 2018-12-01 PROCEDURE — 74011250637 HC RX REV CODE- 250/637: Performed by: INTERNAL MEDICINE

## 2018-12-01 PROCEDURE — 84484 ASSAY OF TROPONIN QUANT: CPT

## 2018-12-01 PROCEDURE — 97162 PT EVAL MOD COMPLEX 30 MIN: CPT

## 2018-12-01 PROCEDURE — 65660000000 HC RM CCU STEPDOWN

## 2018-12-01 PROCEDURE — 94760 N-INVAS EAR/PLS OXIMETRY 1: CPT

## 2018-12-01 PROCEDURE — 93306 TTE W/DOPPLER COMPLETE: CPT

## 2018-12-01 PROCEDURE — 70551 MRI BRAIN STEM W/O DYE: CPT

## 2018-12-01 RX ORDER — ACETAMINOPHEN 325 MG/1
650 TABLET ORAL
Status: DISCONTINUED | OUTPATIENT
Start: 2018-12-01 | End: 2018-12-04 | Stop reason: HOSPADM

## 2018-12-01 RX ORDER — LEVOTHYROXINE SODIUM 25 UG/1
25 TABLET ORAL
Status: DISCONTINUED | OUTPATIENT
Start: 2018-12-02 | End: 2018-12-04 | Stop reason: HOSPADM

## 2018-12-01 RX ORDER — ACETAMINOPHEN 650 MG/1
650 SUPPOSITORY RECTAL
Status: DISCONTINUED | OUTPATIENT
Start: 2018-12-01 | End: 2018-12-04 | Stop reason: HOSPADM

## 2018-12-01 RX ORDER — HEPARIN SODIUM 5000 [USP'U]/ML
5000 INJECTION, SOLUTION INTRAVENOUS; SUBCUTANEOUS EVERY 8 HOURS
Status: DISCONTINUED | OUTPATIENT
Start: 2018-12-01 | End: 2018-12-04 | Stop reason: HOSPADM

## 2018-12-01 RX ORDER — LABETALOL HYDROCHLORIDE 5 MG/ML
5 INJECTION, SOLUTION INTRAVENOUS
Status: DISCONTINUED | OUTPATIENT
Start: 2018-12-01 | End: 2018-12-04 | Stop reason: HOSPADM

## 2018-12-01 RX ORDER — TAMSULOSIN HYDROCHLORIDE 0.4 MG/1
0.4 CAPSULE ORAL DAILY
Status: DISCONTINUED | OUTPATIENT
Start: 2018-12-01 | End: 2018-12-04 | Stop reason: HOSPADM

## 2018-12-01 RX ORDER — SODIUM CHLORIDE 0.9 % (FLUSH) 0.9 %
5-10 SYRINGE (ML) INJECTION EVERY 8 HOURS
Status: DISCONTINUED | OUTPATIENT
Start: 2018-12-01 | End: 2018-12-04 | Stop reason: HOSPADM

## 2018-12-01 RX ORDER — GUAIFENESIN 100 MG/5ML
81 LIQUID (ML) ORAL DAILY
Status: DISCONTINUED | OUTPATIENT
Start: 2018-12-01 | End: 2018-12-04 | Stop reason: HOSPADM

## 2018-12-01 RX ORDER — SODIUM CHLORIDE 0.9 % (FLUSH) 0.9 %
5-10 SYRINGE (ML) INJECTION AS NEEDED
Status: DISCONTINUED | OUTPATIENT
Start: 2018-12-01 | End: 2018-12-04 | Stop reason: HOSPADM

## 2018-12-01 RX ORDER — SODIUM CHLORIDE 9 MG/ML
100 INJECTION, SOLUTION INTRAVENOUS CONTINUOUS
Status: DISCONTINUED | OUTPATIENT
Start: 2018-12-01 | End: 2018-12-03

## 2018-12-01 RX ORDER — MEMANTINE HYDROCHLORIDE 10 MG/1
10 TABLET ORAL 2 TIMES DAILY
Status: DISCONTINUED | OUTPATIENT
Start: 2018-12-01 | End: 2018-12-04 | Stop reason: HOSPADM

## 2018-12-01 RX ADMIN — HEPARIN SODIUM 5000 UNITS: 5000 INJECTION INTRAVENOUS; SUBCUTANEOUS at 08:24

## 2018-12-01 RX ADMIN — Medication 10 ML: at 01:20

## 2018-12-01 RX ADMIN — MEMANTINE 10 MG: 10 TABLET ORAL at 10:14

## 2018-12-01 RX ADMIN — Medication 10 ML: at 21:51

## 2018-12-01 RX ADMIN — Medication 10 ML: at 08:25

## 2018-12-01 RX ADMIN — TAMSULOSIN HYDROCHLORIDE 0.4 MG: 0.4 CAPSULE ORAL at 10:14

## 2018-12-01 RX ADMIN — Medication 10 ML: at 18:23

## 2018-12-01 RX ADMIN — SODIUM CHLORIDE 100 ML/HR: 900 INJECTION, SOLUTION INTRAVENOUS at 01:20

## 2018-12-01 RX ADMIN — MEMANTINE 10 MG: 10 TABLET ORAL at 18:22

## 2018-12-01 RX ADMIN — ASPIRIN 81 MG 81 MG: 81 TABLET ORAL at 10:13

## 2018-12-01 RX ADMIN — Medication 10 ML: at 18:22

## 2018-12-01 RX ADMIN — HEPARIN SODIUM 5000 UNITS: 5000 INJECTION INTRAVENOUS; SUBCUTANEOUS at 18:22

## 2018-12-01 NOTE — CONSULTS
IP CONSULT TO NEUROLOGY  Consult performed by: Leila Grady MD  Consult ordered by: Richard Connors MD            NEUROLOGY CONSULT    NAME Glynn Diana AGE 80 y.o. MRN [id number]  3/15/1933     REQUESTING PHYSICIAN: Karli Reed MD      CHIEF COMPLAINT: Altered mental status     This is a 80 y.o. male with a medical history of dementia. Admitted after being found on the ground confused. No signs of head trauma. Had beensuffing diarrhea for three days. Cognitive state secondary to dementia makes for a limited exam.             ASSESSMENT AND PLAN     1. Altered mental status, unspecified altered mental status type  Differential is seizure. Stroke dehydration  EEG  MRI brain  Rehydrate  thiamine    2. Dementia  Continue memantine       3. Hypertension  continue amlodipine      5. Gout  continue allopurinol    6. Hypothyroidism  Start levoxyl    ALLERGIES:  Patient has no known allergies.      Current Facility-Administered Medications   Medication Dose Route Frequency    memantine (NAMENDA) tablet 10 mg  10 mg Oral BID    tamsulosin (FLOMAX) capsule 0.4 mg  0.4 mg Oral DAILY    sodium chloride (NS) flush 5-10 mL  5-10 mL IntraVENous Q8H    sodium chloride (NS) flush 5-10 mL  5-10 mL IntraVENous PRN    acetaminophen (TYLENOL) tablet 650 mg  650 mg Oral Q6H PRN    heparin (porcine) injection 5,000 Units  5,000 Units SubCUTAneous Q8H    sodium chloride (NS) flush 5-10 mL  5-10 mL IntraVENous Q8H    sodium chloride (NS) flush 5-10 mL  5-10 mL IntraVENous PRN    acetaminophen (TYLENOL) tablet 650 mg  650 mg Oral Q4H PRN    Or    acetaminophen (TYLENOL) solution 650 mg  650 mg Per NG tube Q4H PRN    Or    acetaminophen (TYLENOL) suppository 650 mg  650 mg Rectal Q4H PRN    aspirin chewable tablet 81 mg  81 mg Oral DAILY    labetalol (NORMODYNE;TRANDATE) injection 5 mg  5 mg IntraVENous Q10MIN PRN    0.9% sodium chloride infusion  100 mL/hr IntraVENous CONTINUOUS    lactated Ringers infusion 1,000 mL  1,000 mL IntraVENous CONTINUOUS       Past Medical History:   Diagnosis Date    Abdominal aortic aneurysm (HCC)     Arthritis     CAD (coronary artery disease)     bypass    Hypertension     Other ill-defined conditions(799.89)     gout    Other ill-defined conditions(799.89)     high cholesterol       Social History     Tobacco Use    Smoking status: Former Smoker    Smokeless tobacco: Never Used   Substance Use Topics    Alcohol use: No       Family History   Problem Relation Age of Onset    Cancer Mother         liver    Hypertension Father     No Known Problems Sister     No Known Problems Brother     No Known Problems Sister         brain aneurysym     Review of Systems   Unable to perform ROS: Dementia       Visit Vitals  /71   Pulse 75   Temp 97.5 °F (36.4 °C)   Resp 18   Ht 6' (1.829 m)   Wt 179 lb 14.3 oz (81.6 kg)   SpO2 96%   BMI 24.40 kg/m²      General: Well developed, well nourished. Patient in no apparent distress   Head: Normocephalic, atraumatic, anicteric sclera   Neck Normal ROM, No thyromegally   Lungs:  Clear to auscultation bilaterally, No wheezes or rubs   Cardiac: Regular rate and rhythm with no murmurs. Abd: Bowel sounds were audible. No tenderness on palpation   Ext: No pedal edema swollen PIP joints   Skin: Supple no rash, tophi on distal PIP joints of the hands     NeurologicExam:  Mental Status: Alert and oriented to person only   Speech: Fluent no aphasia or dysarthria. Cranial Nerves:  II - XII Intact   Motor:  Full and symmetric strength of upper and lower proximal and distal muscles. Normal bulk and tone. Reflexes:   Deep tendon reflexes 1+/4 and symmetric. Sensory:   Symmetric distal reduction in sensory perception affecting all modalities. Gait:  deferred. Tremor:   No tremor noted. Cerebellar:  Coordination intact. Neurovascular: No carotid bruits.  No JVD       REVIEWED IMAGING:    MRI :  Results from Hospital Encounter encounter on 11/30/18   MRI BRAIN WO CONT    Narrative *PRELIMINARY REPORT*    No acute intracranial abnormality. Age-related volume loss and nonspecific white  matter disease. Preliminary report was provided by Dr. Robert Casiano, the on-call radiologist, at [phone]  hours. Final report to follow. *END PRELIMINARY REPORT*    INDICATION:  Decreased alertness    Sagittal, axial, and coronal MRI of the brain was performed without contrast.    FINDINGS: There are multiple foci of high signal intensity in the deep white  matter bilaterally. Findings are nonspecific but usually associated with chronic  ischemic change. There is no intracranial mass or hemorrhage. The signal void in  the vessels at the base of the brain is present and normal. No evidence for  restricted diffusion. Impression IMPRESSION: Nonspecific high signal in the deep white matter likely due to  chronic ischemic change. Otherwise negative. CT:  Results from Hospital Encounter encounter on 11/30/18   CT SPINE CERV WO CONT    Narrative EXAM:  CT CERVICAL SPINE WITHOUT CONTRAST    INDICATION:   fall. Altered mental status    COMPARISON: None. CONTRAST:  None. TECHNIQUE: Multislice helical CT of the cervical spine was performed without  intravenous contrast administration. Sagittal and coronal reconstructions were  generated. CT dose reduction was achieved through use of a standardized  protocol tailored for this examination and automatic exposure control for dose  modulation. FINDINGS:    The alignment is within normal limits. There is no fracture or subluxation. The  odontoid process is intact. The craniocervical junction is within normal limits. The prevertebral soft tissues are within normal limits. Spondylosis is noted at  C4-5, C5-6, and C6-7 with posterior osteophyte/disc bulge complexes causing  mild-to-moderate spinal stenosis.  Bilateral neural foraminal narrowing is noted  at these levels secondary to uncovertebral osteophyte formation. Impression IMPRESSION:  Spondylitic changes without acute abnormality.            REVIEWED LABS:  Lab Results   Component Value Date/Time    WBC 7.1 12/01/2018 08:29 AM    HCT 33.3 (L) 12/01/2018 08:29 AM    HGB 11.1 (L) 12/01/2018 08:29 AM    PLATELET 842 (L) 94/56/6387 08:29 AM     Lab Results   Component Value Date/Time    Sodium 142 12/01/2018 08:29 AM    Potassium 3.7 12/01/2018 08:29 AM    Chloride 110 (H) 12/01/2018 08:29 AM    CO2 23 12/01/2018 08:29 AM    Glucose 72 12/01/2018 08:29 AM    BUN 38 (H) 12/01/2018 08:29 AM    Creatinine 2.09 (H) 12/01/2018 08:29 AM    Calcium 8.1 (L) 12/01/2018 08:29 AM     Lab Results   Component Value Date/Time    Vitamin B12 659 12/01/2018 04:48 AM    Folate 15.1 11/26/2017 05:14 AM     Lab Results   Component Value Date/Time    LDL, calculated 161 (H) 03/15/2018 04:22 PM

## 2018-12-01 NOTE — PROGRESS NOTES
Problem: Self Care Deficits Care Plan (Adult) Goal: *Acute Goals and Plan of Care (Insert Text) Occupational Therapy Goals Initiated 12/1/2018 1. Patient will perform grooming standing at sink with modified independence within 7 day(s). 2.  Patient will perform upper body ADLs seated EOB for 5 minutes with modified independence within 7 day(s). 3.  Patient will perform lower body dressing with modified independence within 7 day(s). 4.  Patient will perform toilet transfers with modified independence within 7 day(s). 5.  Patient will perform all aspects of toileting with modified independence within 7 day(s). 6.  Patient will participate in upper extremity therapeutic exercise/activities with modified independence for 5 minutes within 7 day(s). 7.  Patient will utilize energy conservation techniques during functional activities with verbal cues within 7 day(s). Occupational Therapy EVALUATION Patient: Jose Guadalupe Landers (13 y.o. male) Date: 12/1/2018 Primary Diagnosis: Change in mental status [R41.82] Precautions:   Fall ASSESSMENT : 
Based on the objective data described below, the patient presents with confusion, impaired balance, decreased awareness of deficits and need for assist with admission for change in mental st atus from [address] [place] [address], patient oriented to self and location-hospital only, seemed hard of hearing at times with increased time allowed for processing and repetition of questions, poor historian of PLOF or location of AL. Patient is currently min A to CGA for bed mobility and functional mobility with HHA x2 into bathroom, VCs for processing steps to complete and safety with toileting, min A for LB ADLs to setup for UB ADLs, I with self feeding. Unsure baseline for ADL independence. At this time will need higher level of care from [address] [place] [address] for assist with ADLs, functional mobility and confusion. Recommend SNF. Patient will benefit from skilled intervention to address the above impairments. Patients rehabilitation potential is considered to be Fair Factors which may influence rehabilitation potential include:  
[]             None noted [x]             Mental ability/status []             Medical condition []             Home/family situation and support systems []             Safety awareness []             Pain tolerance/management 
[]             Other: PLAN : 
Recommendations and Planned Interventions: 
[x]               Self Care Training                  [x]        Therapeutic Activities [x]               Functional Mobility Training    []        Cognitive Retraining 
[x]               Therapeutic Exercises           [x]        Endurance Activities [x]               Balance Training                   []        Neuromuscular Re-Education []               Visual/Perceptual Training     [x]   Home Safety Training 
[x]               Patient Education                 []        Family Training/Education []               Other (comment): Frequency/Duration: Patient will be followed by occupational therapy 3 times a week to address goals. Discharge Recommendations: Aki Meyer Further Equipment Recommendations for Discharge: [facility], does not have RW per patient SUBJECTIVE:  
Patient stated Jm Schwarz does help with my medicine but not today or yesterday.  confusion, reoriented to situation and level of care given OBJECTIVE DATA SUMMARY:  
HISTORY:  
Past Medical History:  
Diagnosis Date  Abdominal aortic aneurysm ([address]  Arthritis  CAD (coronary artery disease) bypass  Hypertension  Other ill-defined conditions(799.89)   
 gout  Other ill-defined conditions(799.89)   
 high cholesterol Past Surgical History:  
Procedure Laterality Date  ABDOMEN SURGERY PROC UNLISTED    
 hernia  CARDIAC SURG PROCEDURE UNLIST    
 bypass surgery  HX CATARACT REMOVAL    
 HX TONSILLECTOMY Prior Level of Function/Environment/Context: per chart lives at [facility] facility, however andria harper historian, no family present, difficulty with voicing PLOF with confusion, stating he is leaving alone. Occupations in which the patient is/was successful, what are the barriers preventing that success:  
Performance Patterns (routines, roles, habits, and rituals):  
Personal Interests and/or values:  
Expanded or extensive additional review of patient history:  
 
Home Situation Home Environment: Assisted living(per chart, patient confused) One/Two Story Residence: One story Living Alone: Yes Support Systems: Family member(s) Patient Expects to be Discharged to[de-identified] Unknown Hand dominance: RightEXAMINATION OF PERFORMANCE DEFICITS: 
Cognitive/Behavioral Status: 
Neurologic State: Confused Orientation Level: Disoriented to time;Oriented to person;Disoriented to place(hospital-not [facility]) Skin: intact Edema: none noted Hearing: Auditory Auditory Impairment: NoneVision/Perceptual:   
    
    
    
  
    
    
Corrective Lenses: Glasses Range of Motion: 
 B UE, able to reach over head, behind and behind back AROM: Within functional limits Strength: 
B UE 4/5 Strength: Generally decreased, functional 
  
  
  
 Coordination: 
Coordination: Generally decreased, functional 
Fine Motor Skills-Upper: Left Impaired;Right Impaired(arthritis in joints/digits) Tone & Sensation: 
Bilateral tone normal, sensation normal.  
 
Tone: Normal 
   
Balance: 
Sitting: Intact Standing: Impaired; With support Standing - Static: Fair;Constant support Standing - Dynamic : FairFunctional Mobility and Transfers for ADLs:Bed Mobility: 
Supine to Sit: Supervision Scooting: Supervision Transfers: 
Sit to Stand: Minimum assistance Stand to Sit: Contact guard assistance Bed to Chair: Minimum assistance Toilet Transfer : Minimum assistance ADL Assessment: 
Feeding: Setup Oral Facial Hygiene/Grooming: Setup Bathing: Minimum assistance; Additional time(impaired balance) Upper Body Dressing: Setup Lower Body Dressing: Minimum assistance; Additional time(fatigue with bending forward) Toileting: Minimum assistance; Additional time(fair balance, unsteadiness) Completed OT evaluation and ADLs seated EOB and standing as able with HHA x2 for balance. Educated on safety and endurance training with encouragement for full participation in ADLs while in hospital. Good understanding noted. ADL Intervention and task modifications: 
  
 
  
 
Patient instructed and indicated understanding the benefits of maintaining activity tolerance, functional mobility, and independence with self care tasks during acute stay  to ensure safe return home and to baseline. Encouraged patient to increase frequency and duration OOB, be out of bed for all meals, perform daily ADLs (as approved by RN/MD regarding bathing etc), and performing functional mobility to/from bathroom. -fair awareness due to confusion Therapeutic Exercise: 
encouraged OOB and full participation with ADLs to improve strength and endurance. Functional Measure: 
Barthel Index: 
 
Bathin Bladder: 10 Bowels: 10 
Groomin Dressin Feeding: 10 Mobility: 10 Stairs: 0 Toilet Use: 5 Transfer (Bed to Chair and Back): 10 Total: 65 Barthel and G-code impairment scale: 
Percentage of impairment CH 
0% CI 
1-19% CJ 
20-39% CK 
40-59% CL 
60-79% CM 
80-99% CN 
100% Barthel Score 0-100 100 99-80 79-60 59-40 20-39 1-19 
 0 Barthel Score 0-20 20 17-19 13-16 9-12 5-8 1-4 0 The Barthel ADL Index: Guidelines 1. The index should be used as a record of what a patient does, not as a record of what a patient could do.  
2. The main aim is to establish degree of independence from any help, physical or verbal, however minor and for whatever reason. 3. The need for supervision renders the patient not independent. 4. A patient's performance should be established using the best available evidence. Asking the patient, friends/relatives and nurses are the usual sources, but direct observation and common sense are also important. However direct testing is not needed. 5. Usually the patient's performance over the preceding 24-48 hours is important, but occasionally longer periods will be relevant. 6. Middle categories imply that the patient supplies over 50 per cent of the effort. 7. Use of aids to be independent is allowed. Radha Connell., Barthel, D.W. ([id number]). Functional evaluation: the Barthel Index. [facility] (14)2. Michael Mojica jacek CARITO Nuno, Betty Mosquera., Nigel Sullivan., Ravi, [address] [place] [address] (1999). Measuring the change indisability after inpatient rehabilitation; comparison of the responsiveness of the Barthel Index and Functional Avery Measure. Journal of Neurology, Neurosurgery, and Psychiatry, 66(4), 895-194. ANANT Newsome, LEONCIO Liu, & Jez Painter MJOE. (2004.) Assessment of post-stroke quality of life in cost-effectiveness studies: The usefulness of the Barthel Index and the EuroQoL-5D. [facility], 13, 016-37 G codes: In compliance with CMSs Claims Based Outcome Reporting, the following G-code set was chosen for this patient based on their primary functional limitation being treated: The outcome measure chosen to determine the severity of the functional limitation was the barthel index with a score of 65/100 which was correlated with the impairment scale. ? Self Care:  
  - CURRENT STATUS: CJ - 20%-39% impaired, limited or restricted  - GOAL STATUS: CI - 1%-19% impaired, limited or restricted  - D/C STATUS:  ---------------To be determined--------------- Occupational Therapy Evaluation Charge Determination History Examination Decision-Making MEDIUM Complexity : Expanded review of history including physical, cognitive and psychosocial  history  MEDIUM Complexity : 3-5 performance deficits relating to physical, cognitive , or psychosocial skils that result in activity limitations and / or participation restrictions MEDIUM Complexity : Patient may present with comorbidities that affect occupational performnce. Miniml to moderate modification of tasks or assistance (eg, physical or verbal ) with assesment(s) is necessary to enable patient to complete evaluation Based on the above components, the patient evaluation is determined to be of the following complexity level: MEDIUM Pain: 
Pain Scale 1: Numeric (0 - 10) Pain Intensity 1: 0 Activity Tolerance:  
fair Please refer to the flowsheet for vital signs taken during this treatment. After treatment:  
[x] Patient left in no apparent distress sitting up in chair 
[] Patient left in no apparent distress in bed 
[x] Call bell left within reach [x] Nursing notified 
[] Caregiver present [x] Bed alarm activated-CHAIR 
 
COMMUNICATION/EDUCATION:  
The patients plan of care was discussed with: Physical Therapist and Registered Nurse. [x] Home safety education was provided and the patient/caregiver indicated understanding. [x] Patient/family have participated as able in goal setting and plan of care. [x] Patient/family agree to work toward stated goals and plan of care. [] Patient understands intent and goals of therapy, but is neutral about his/her participation. [] Patient is unable to participate in goal setting and plan of care. This patients plan of care is appropriate for delegation to [facility]. Thank you for this referral. 
Adriane Farooq OT Time Calculation: 26 mins

## 2018-12-01 NOTE — PROGRESS NOTES
Problem: Mobility Impaired (Adult and Pediatric) Goal: *Acute Goals and Plan of Care (Insert Text) Physical Therapy Goals Initiated 12/1/2018 1. Patient will move from supine to sit and sit to supine , scoot up and down and roll side to side in bed with independence within 7 day(s). 2.  Patient will transfer from bed to chair and chair to bed with modified independence using the least restrictive device within 7 day(s). 3.  Patient will perform sit to stand with modified independence within 7 day(s). 4.  Patient will ambulate with modified independence for 250 feet with the least restrictive device within 7 day(s). 5.  Patient will ascend/descend 4 stairs with 1 handrail(s) with modified independence within 7 day(s). physical Therapy EVALUATION Patient: Miguel Clifton (41 y.o. male) Date: 12/1/2018 Primary Diagnosis: Change in mental status [R41.82] Precautions:  Fall ASSESSMENT : 
Based on the objective data described below, the patient presents with impaired functional mobility secondary to impaired standing balance, impaired gait mechanics, generalized weakness, decreased ROM, increased L hip pain, increased confusion, and decreased activity tolerance following admission for AMS. Pt received supine in bed and agreeable to PT evaluation. Pt alert and oriented x 1 (self). Pt cleared by nursing for mobility. Bed mobility performed with supervision. Sit<>stand transfers completed with min-CGA x 1 from EOB and commode. He ambulated 95 ft with min A x 1 and HHA x 1, exhibiting slow gait speed, narrowed MARCO, mild path deviations, and unsteady gait (noting two minor LOB). Pt needed occasional VCs for safety during activity as pt frequently reaching out with BUEs for support. Pt with c/o L hip pain during all mobility, noting the pain was more deep, and pt contributing this pain to GLF that occurred PTA.  Pt was left sitting in bedside chair with all needs met, RN aware, and chair alarm on following therapy session. Recommend pt discharge to SNF rehab to improve functional mobility and independence. PT will continue to follow to address mobility impairments as noted above. Patient will benefit from skilled intervention to address the above impairments. Patients rehabilitation potential is considered to be Fair Factors which may influence rehabilitation potential include:  
[]         None noted 
[x]         Mental ability/status [x]         Medical condition 
[]         Home/family situation and support systems 
[]         Safety awareness 
[]         Pain tolerance/management 
[]         Other: PLAN : 
Recommendations and Planned Interventions: 
[x]           Bed Mobility Training             []    Neuromuscular Re-Education 
[x]           Transfer Training                   []    Orthotic/Prosthetic Training 
[x]           Gait Training                         []    Modalities [x]           Therapeutic Exercises           []    Edema Management/Control 
[x]           Therapeutic Activities            [x]    Patient and Family Training/Education 
[]           Other (comment): Frequency/Duration: Patient will be followed by physical therapy  4 times a week to address goals. Discharge Recommendations: Aki Meyer Further Equipment Recommendations for Discharge: TBD by rehab SUBJECTIVE:  
Patient stated I played baseball, basketball. ... Ink OBJECTIVE DATA SUMMARY:  
HISTORY:   
Past Medical History:  
Diagnosis Date  Abdominal aortic aneurysm ([address]  Arthritis  CAD (coronary artery disease) bypass  Hypertension  Other ill-defined conditions(799.89)   
 gout  Other ill-defined conditions(799.89)   
 high cholesterol Past Surgical History:  
Procedure Laterality Date  ABDOMEN SURGERY PROC UNLISTED    
 hernia  CARDIAC SURG PROCEDURE UNLIST    
 bypass surgery  HX CATARACT REMOVAL    
 HX TONSILLECTOMY Prior Level of Function/Home Situation: Pt is a poor historian, however per chart, pt was living in custodial alone. With GLF PTA. Personal factors and/or comorbidities impacting plan of care: arthritis; gout; HTN Home Situation Home Environment: Assisted living(per chart, patient confused) One/Two Story Residence: One story Living Alone: Yes Support Systems: Family member(s) Patient Expects to be Discharged to[de-identified] Unknown EXAMINATION/PRESENTATION/DECISION MAKING: Critical Behavior: 
Neurologic State: Confused Orientation Level: Disoriented to time, Oriented to person, Disoriented to [employer]) Hearing: Auditory Auditory Impairment: NoneSkin:  Intact Edema: None Range Of Motion: 
AROM: Within functional limits Strength:   
Strength: Generally decreased, functional 
  
  
  
  
  
  
Tone & Sensation:  
Tone: Normal 
  
  
  
  
  
  
  
  
   
Coordination: 
Coordination: Generally decreased, functional 
Vision:  
Corrective Lenses: Glasses Functional Mobility: 
Bed Mobility: 
  
Supine to Sit: Supervision Scooting: Supervision Transfers: 
Sit to Stand: Minimum assistance Stand to Sit: Contact guard assistance Bed to Chair: Minimum assistance Balance:  
Sitting: Intact Standing: Impaired; With support Standing - Static: Fair;Constant support Standing - Dynamic : FairAmbulation/Gait Training:Distance (ft): 95 Feet (ft) Assistive Device: Gait belt(HHA x 1) Ambulation - Level of Assistance: Minimal assistance;Assist x1;Additional time Gait Description (WDL): Exceptions to [facility] Gait Abnormalities: Decreased step clearance; Path deviations Base of Support: Narrowed Speed/Darlene: Pace decreased (<100 feet/min) Step Length: Left shortened;Right shortened Functional Measure: 
Barthel Index: 
 
Bathin Bladder: 10 Bowels: 10 
Groomin Dressin Feeding: 10 Mobility: 10 Stairs: 0 Toilet Use: 5 Transfer (Bed to Chair and Back): 10 Total: 65 Barthel and G-code impairment scale: 
Percentage of impairment CH 
0% CI 
1-19% CJ 
20-39% CK 
40-59% CL 
60-79% CM 
80-99% CN 
100% Barthel Score 0-100 100 99-80 79-60 59-40 20-39 1-19 
 0 Barthel Score 0-20 20 17-19 13-16 9-12 5-8 1-4 0 The Barthel ADL Index: Guidelines 1. The index should be used as a record of what a patient does, not as a record of what a patient could do. 2. The main aim is to establish degree of independence from any help, physical or verbal, however minor and for whatever reason. 3. The need for supervision renders the patient not independent. 4. A patient's performance should be established using the best available evidence. Asking the patient, friends/relatives and nurses are the usual sources, but direct observation and common sense are also important. However direct testing is not needed. 5. Usually the patient's performance over the preceding 24-48 hours is important, but occasionally longer periods will be relevant. 6. Middle categories imply that the patient supplies over 50 per cent of the effort. 7. Use of aids to be independent is allowed. Chip Vargas., Barthel, D.W. (9691). Functional evaluation: the Barthel Index. [facility] (14)2. CARITO Diallo, [facility]., [facility], [address] [place] [address] (1999). Measuring the change indisability after inpatient rehabilitation; comparison of the responsiveness of the Barthel Index and Functional [place] Measure. Journal of Neurology, Neurosurgery, and Psychiatry, 66(4), 768-147. Rex Christianson, N.J.A, LEONCIO Liu, & Gurvinder Francis MSydneyA. (2004.) Assessment of post-stroke quality of life in cost-effectiveness studies: The usefulness of the Barthel Index and the EuroQoL-5D. [facility], 13, 433-07 G codes: In compliance with CMSs Claims Based Outcome Reporting, the following G-code set was chosen for this patient based on their primary functional limitation being treated: The outcome measure chosen to determine the severity of the functional limitation was the Barthel Index with a score of 65/100 which was correlated with the impairment scale. ? Mobility - Walking and Moving Around:  
  - CURRENT STATUS: CJ - 20%-39% impaired, limited or restricted  - GOAL STATUS: CI - 1%-19% impaired, limited or restricted  - D/C STATUS:  ---------------To be determined--------------- Physical Therapy Evaluation Charge Determination History Examination Presentation Decision-Making HIGH Complexity :3+ comorbidities / personal factors will impact the outcome/ POC  HIGH Complexity : 4+ Standardized tests and measures addressing body structure, function, activity limitation and / or participation in recreation  MEDIUM Complexity : Evolving with changing characteristics  Other outcome measures Barthel Index  MEDIUM Based on the above components, the patient evaluation is determined to be of the following complexity level: MEDIUM Pain: 
Pain Scale 1: Numeric (0 - 10) Pain Intensity 1: 0 Activity Tolerance:  
Good/fair - limited endurance; c/o L hip pain Please refer to the flowsheet for vital signs taken during this treatment. After treatment:  
[x]         Patient left in no apparent distress sitting up in chair 
[]         Patient left in no apparent distress in bed 
[x]         Call bell left within reach [x]         Nursing notified 
[]         Caregiver present [x]         Bed alarm activated COMMUNICATION/EDUCATION:  
The patients plan of care was discussed with: Physical Therapist, Occupational Therapist and Registered Nurse. [x]         Fall prevention education was provided and the patient/caregiver indicated understanding. [x]         Patient/family have participated as able in goal setting and plan of care. [x]         Patient/family agree to work toward stated goals and plan of care. []         Patient understands intent and goals of therapy, but is neutral about his/her participation. []         Patient is unable to participate in goal setting and plan of care. Thank you for this referral. 
Tania Champion, PT, DPT Time Calculation: 27 mins

## 2018-12-01 NOTE — H&P
Hospitalist Admission NoteNAME: Jesika Yeung :  3/15/1933 MRN:  [id number] Date/Time:  2018 5:38 AM 
 
Patient PCP: Frankie Santizo MD 
______________________________________________________________________ Given the patient's current clinical presentation, I have a high level of concern for decompensation if discharged from the emergency department. Complex decision making was performed, which includes reviewing the patient's available past medical records, laboratory results, and x-ray films. My assessment of this patient's clinical condition and my plan of care is as follows. Assessment / Plan: 
 
Change mental status cause unknown possibly secondary to metabolic encephalopathy versus acute stroke Admit to telemetry unit Head CT scan negative for acute abnormality Check ammonia level TSH vitamin B12 Brain MRI at a.m. Neuro consultation Check urine analysis We will start patient on aspirin Keep patient n.p.o. Speech therapy evaluation Start patient on IV fluid Hypertension We will hold antihypertensive medication patient currently hypo-tensive BPH 
We will continue Flomax 0.4 mg daily Dementia We will continue Namenda 10 mg daily Gout Hold allopurinol because of worsening renal function Acute on chronic renal failure Start patient on IV fluid Hold Lasix Follow-up repeat at a.m. Diarrhea  
-check stool study  
-check stool for  CDIFF Code Status: full code Surrogate Decision Maker: DVT Prophylaxis: heparin GI Prophylaxis: not indicated Baseline: live in assisting living Subjective: CHIEF COMPLAINT:change mental status HISTORY OF PRESENT ILLNESS:    
 
80years old male with past medical history significant for coronary artery disease, HTN, gout, arthritis, chronic kidney disease presented to the ED complaining from worsening mental status, according to the EMS report patient was found on the ground patient lives in assisted living facility apartment and according to the report patient had diarrhea for past 3 days, and patient also noticed has difficulty walking past 3 days, workup in ED head CT scan was negative for any abnormality chest x-ray showed no acute abnormality CPK was noticed to be elevated 998, patient will be admitted for further evaluation of change mental status. We were asked to admit for work up and evaluation of the above problems. Past Medical History:  
Diagnosis Date  Abdominal aortic aneurysm ([address]  Arthritis  CAD (coronary artery disease) bypass  Hypertension  Other ill-defined conditions(799.89)   
 gout  Other ill-defined conditions(799.89)   
 high cholesterol Past Surgical History:  
Procedure Laterality Date  ABDOMEN SURGERY PROC UNLISTED    
 hernia  CARDIAC SURG PROCEDURE UNLIST    
 bypass surgery  HX CATARACT REMOVAL    
 HX TONSILLECTOMY Social History Tobacco Use  Smoking status: Former Smoker  Smokeless tobacco: Never Used Substance Use Topics  Alcohol use: No  
  
 
Family History Problem Relation Age of Onset  Cancer Mother   
     liver  Hypertension Father  No Known Problems Sister  No Known Problems Brother  No Known Problems Sister   
     brain aneurysym No Known Allergies Prior to Admission medications Medication Sig Start Date End Date Taking? Authorizing Provider  
metoprolol succinate (TOPROL-XL) 50 mg XL tablet TAKE 1 TABLET BY MOUTH DAILY. 10/23/18   Saige Bob MD  
tamsulosin (FLOMAX) 0.4 mg capsule TAKE 1 CAPSULE BY MOUTH EVERY DAY 10/23/18   Saige Bob MD  
amLODIPine (NORVASC) 5 mg tablet Take 1 Tab by mouth daily. 10/5/18   Saige Bob MD  
allopurinol (ZYLOPRIM) 100 mg tablet Take 1 Tab by mouth daily. 9/24/18   Saige Bob MD  
LASIX 20 mg tablet Take 1 Tab by mouth every other day.  Indications: Edema 4/20/18   Saige Bob MD  
memantine (NAMENDA) 10 mg tablet Take 1 Tab by mouth two (2) times a day. 10mg Twice daily 4/20/18   Saige Bob MD  
QUEtiapine (SEROQUEL) 25 mg tablet Take 0.5 Tabs by mouth nightly. 4/20/18   Saige Bob MD  
furosemide (LASIX) 20 mg tablet TAKE 1 TABLET BY MOUTH EVERY OTHER DAY 4/19/18   Saige Bob MD  
QUEtiapine (SEROQUEL) 25 mg tablet TAKE 1/2 TABLET ORALLY NIGHTLY 4/19/18   Saige Bob MD  
 
 
REVIEW OF SYSTEMS:    
I am not able to complete the review of systems because: The patient is intubated and sedated  
y The patient has altered mental status due to his acute medical problems The patient has baseline aphasia from prior stroke(s) The patient has baseline dementia and is not reliable historian The patient is in acute medical distress and unable to provide information Total of 12 systems reviewed as follows:   
   POSITIVE= underlined text  Negative = text not underlined General:  fever, chills, sweats, generalized weakness, weight loss/gain,  
   loss of appetite Eyes:    blurred vision, eye pain, loss of vision, double vision ENT:    rhinorrhea, pharyngitis Respiratory:   cough, sputum production, SOB, URENA, wheezing, pleuritic pain  
Cardiology:   chest pain, palpitations, orthopnea, PND, edema, syncope Gastrointestinal:  abdominal pain , N/V, diarrhea, dysphagia, constipation, bleeding Genitourinary:  frequency, urgency, dysuria, hematuria, incontinence Muskuloskeletal :  arthralgia, myalgia, back pain Hematology:  easy bruising, nose or gum bleeding, lymphadenopathy Dermatological: rash, ulceration, pruritis, color change / jaundice Endocrine:   hot flashes or polydipsia Neurological:  headache, dizziness, confusion, focal weakness, paresthesia, Speech difficulties, memory loss, gait difficulty Psychological: Feelings of anxiety, depression, agitation Objective: VITALS:   
Visit Vitals /67 Pulse 63  
 Temp 97.5 °F (36.4 °C) Resp 14 Ht 6' (1.829 m) Wt 81.6 kg (179 lb 14.3 oz) SpO2 97% BMI 24.40 kg/m² PHYSICAL EXAM: 
 
General:    Confused HEENT: Atraumatic, anicteric sclerae, pink conjunctivae No oral ulcers, mucosa moist, throat clear, dentition fair Neck:  Supple, symmetrical,  thyroid: non tender Lungs:   Clear to auscultation bilaterally. No Wheezing or Rhonchi. No rales. Chest wall:  No tenderness  No Accessory muscle use. Heart:   Regular  rhythm,  No  murmur   No edema Abdomen:   Soft, non-tender. Not distended. Bowel sounds normal 
Extremities: No cyanosis. No clubbing,   
  Skin turgor normal, Capillary refill normal, Radial dial pulse 2+ Skin:     Not pale. Not Jaundiced  No rashes Neurologic: Confused , follow simple command ,resposive to verbal stimuli  
_______________________________________________________________________ Care Plan discussed with: 
  Comments Patient y Family RN Care Manager Consultant:     
_______________________________________________________________________ Expected  Disposition:  
Home with Family y HH/PT/OT/RN   
SNF/LTC   
MARCELLE   
________________________________________________________________________ TOTAL TIME:  65 Minutes Critical Care Provided     Minutes non procedure based Comments  
 y Reviewed previous records  
>50% of visit spent in counseling and coordination of care y Discussion with patient and/or family and questions answered 
  
 
________________________________________________________________________ Signed: Stanislaw Bravo MD 
 
Procedures: see electronic medical records for all procedures/Xrays and details which were not copied into this note but were reviewed prior to creation of Plan. LAB DATA REVIEWED:   
Recent Results (from the past 24 hour(s)) CBC WITH AUTOMATED DIFF Collection Time: 11/30/18  4:55 PM  
Result Value Ref Range WBC 8.0 4.1 - 11.1 K/uL RBC 3.35 (L) 4.10 - 5.70 M/uL  
 HGB 11.7 (L) 12.1 - 17.0 g/dL HCT 34.5 (L) 36.6 - 50.3 % .0 (H) 80.0 - 99.0 FL  
 MCH 34.9 (H) 26.0 - 34.0 PG  
 MCHC 33.9 30.0 - 36.5 g/dL  
 RDW 14.1 11.5 - 14.5 % PLATELET 114 211 - 428 K/uL MPV 10.0 8.9 - 12.9 FL  
 NRBC 0.0 0  WBC ABSOLUTE NRBC 0.00 0.00 - 0.01 K/uL NEUTROPHILS 73 32 - 75 % LYMPHOCYTES 14 12 - 49 % MONOCYTES 11 5 - 13 % EOSINOPHILS 1 0 - 7 % BASOPHILS 0 0 - 1 % IMMATURE GRANULOCYTES 1 (H) 0.0 - 0.5 % ABS. NEUTROPHILS 5.8 1.8 - 8.0 K/UL  
 ABS. LYMPHOCYTES 1.1 0.8 - 3.5 K/UL  
 ABS. MONOCYTES 0.9 0.0 - 1.0 K/UL  
 ABS. EOSINOPHILS 0.1 0.0 - 0.4 K/UL  
 ABS. BASOPHILS 0.0 0.0 - 0.1 K/UL  
 ABS. IMM. GRANS. 0.0 0.00 - 0.04 K/UL  
 DF AUTOMATED METABOLIC PANEL, COMPREHENSIVE Collection Time: 11/30/18  4:55 PM  
Result Value Ref Range Sodium 138 136 - 145 mmol/L Potassium 3.9 3.5 - 5.1 mmol/L Chloride 106 97 - 108 mmol/L  
 CO2 25 21 - 32 mmol/L Anion gap 7 5 - 15 mmol/L Glucose 88 65 - 100 mg/dL BUN 42 (H) 6 - 20 MG/DL Creatinine 2.49 (H) 0.70 - 1.30 MG/DL  
 BUN/Creatinine ratio 17 12 - 20 GFR est AA 30 (L) >60 ml/min/1.73m2 GFR est non-AA 25 (L) >60 ml/min/1.73m2 Calcium 8.8 8.5 - 10.1 MG/DL Bilirubin, total 1.1 (H) 0.2 - 1.0 MG/DL  
 ALT (SGPT) 21 12 - 78 U/L  
 AST (SGOT) 46 (H) 15 - 37 U/L Alk. phosphatase 83 45 - 117 U/L Protein, total 7.4 6.4 - 8.2 g/dL Albumin 3.8 3.5 - 5.0 g/dL Globulin 3.6 2.0 - 4.0 g/dL A-G Ratio 1.1 1.1 - 2.2 MAGNESIUM Collection Time: 11/30/18  4:55 PM  
Result Value Ref Range Magnesium 2.4 1.6 - 2.4 mg/dL TROPONIN I Collection Time: 11/30/18  4:55 PM  
Result Value Ref Range Troponin-I, Qt. <0.05 <0.05 ng/mL CK Collection Time: 11/30/18  4:55 PM  
Result Value Ref Range  (H) 39 - 308 U/L  
EKG, 12 LEAD, INITIAL Collection Time: 11/30/18  5:05 PM  
Result Value Ref Range Ventricular Rate 67 BPM  
 Atrial Rate 67 BPM  
 P-R Interval 116 ms  
 QRS Duration 124 ms Q-T Interval 448 ms QTC Calculation (Bezet) 473 ms Calculated P Axis 25 degrees Calculated R Axis -37 degrees Calculated T Axis 59 degrees Diagnosis Normal sinus rhythm Left axis deviation Right bundle branch block Possible Inferior infarct (cited on or before 30-NOV-2018) When compared with ECG of 30-JAN-2018 10:54, No significant change was found

## 2018-12-01 NOTE — ED NOTES
Pt resting with eyes closed. RR equal and unlabored. VSS. Pt close to nurse's station. Will continue to monitor.

## 2018-12-01 NOTE — ROUTINE PROCESS
.. TRANSFER - IN REPORT: 
 
Verbal report received from Sosa RN(name) on Acosta Jackelyn  being received from the ED  for routine progression of care Report consisted of patients Situation, Background, Assessment and  
Recommendations(SBAR). Information from the following report(s) SBAR, Kardex, ED Summary and Cardiac Rhythm NSR was reviewed with the receiving nurse. Opportunity for questions and clarification was provided. Assessment completed upon patients arrival to unit and care assumed. Pt very confused affect flat admission database difficult to complete

## 2018-12-01 NOTE — ED NOTES
TRANSFER - OUT REPORT: 
 
Verbal report given to Felipe Bullard on Genevieve Gatica  being transferred to Neuro for routine progression of care Report consisted of patients Situation, Background, Assessment and  
Recommendations(SBAR). Information from the following report(s) SBAR was reviewed with the receiving nurse. Lines:  
Peripheral IV 11/30/18 Left Antecubital (Active) Site Assessment Clean, dry, & intact 11/30/2018  4:57 PM  
Phlebitis Assessment 0 11/30/2018  4:57 PM  
Infiltration Assessment 4 11/30/2018  4:57 PM  
Hub Color/Line Status Pink;Capped;Flushed 11/30/2018  4:57 PM  
  
 
Opportunity for questions and clarification was provided. Patient transported with: 
 [employer]

## 2018-12-01 NOTE — ROUTINE PROCESS
.. 
* No surgery found * 
* No surgery found * Bedside and Verbal shift change report given to [address] [place] [address],[address] (oncoming nurse) by Anila Rosario RN (offgoing nurse). Report included the following information Kardex and Cardiac Rhythm NSR. Zone Phone:   [phone] Significant changes during shift:  Admission Patient Information Carol Cárdenas 
80 y.o. 
11/30/2018  3:25 PM by Irma West MD. Carol Cárdenas was admitted from [facility] Problem List 
 
Patient Active Problem List  
 Diagnosis Date Noted  Change in mental status 11/30/2018  Inability to walk 11/22/2017  Coronary artery disease involving native coronary artery without angina pectoris 05/13/2016  S/P CABG (coronary artery bypass graft) 05/13/2016  
 Hiatal hernia with gastroesophageal reflux 05/13/2016  H/O endovascular stent graft for abdominal aortic aneurysm 05/13/2016  CKD (chronic kidney disease) stage 3, GFR 30-59 ml/min ([facility]) 05/13/2016  AAA (abdominal aortic aneurysm) ([facility] 11/07/2014 Past Medical History:  
Diagnosis Date  Abdominal aortic aneurysm ([facility]  Arthritis  CAD (coronary artery disease) bypass  Hypertension  Other ill-defined conditions(799.89)   
 gout  Other ill-defined conditions(799.89)   
 high cholesterol Core Measures: CVA: Yes Yes Activity Status: OOB to Chair No 
Ambulated this shift No  
Bed Rest Yes Supplemental O2: (If Applicable) NC Yes NRB No 
Venti-mask No 
On 1 Liters/min LINES AND DRAINS:   PIV 20g DVT prophylaxis: DVT prophylaxis Med- No 
DVT prophylaxis SCD or ALMAS- Yes Wounds: (If Applicable) Wounds- No 
 
Location 0 Patient Safety: 
 
Falls Score Total Score: 2 Safety Level_______ Bed Alarm On? Yes Sitter? No 
 
Plan for upcoming shift: MRI 2DECHO Discharge Plan: No   
 
Active Consults: 
IP CONSULT TO NEUROLOGY

## 2018-12-01 NOTE — PROGRESS NOTES
Hospitalist Progress NoteNAME: Acosta Hayden :  3/15/1933 MRN:  [id number] Interim Hospital Summary: 80 y.o. male whom presented on 2018 with Assessment / Plan: 1. Acute encephalopathy(POA): Possibly metabolic vs infectious. CT head negative. MRI preliminary report with no acute findings, ECHO results pending Neurology consulted. Ordered UA and will follow., 
2. Hypertension: holding home meds due to hypotension, but now BP improved and will start on low dose amlodipine. BB and lasix held. 3. BPH: on Flomax 0.4 mg daily 4. Dementia: on Namenda 10 mg daily 5. Gout: holding allopurinol because of worsening renal function 6. Acute on chronic renal failure: on IVF, renally dose meds. Repeat BMP in am. 
7.  Diarrhea : no stool study sent, will follow  
  
  
Code Status: full code Surrogate Decision Maker: 
  
DVT Prophylaxis: heparin GI Prophylaxis: not indicated 
  
Baseline: live in assisting living Subjective: Chief Complaint / Reason for Physician Visit Denies any headcahe/N/V/SOB. Discussed with RN events overnight. Review of Systems: 
Symptom Y/N Comments  Symptom Y/N Comments Fever/Chills    Chest Pain Poor Appetite    Edema Cough    Abdominal Pain Sputum    Joint Pain SOB/URENA    Pruritis/Rash Nausea/vomit    Tolerating PT/OT Diarrhea    Tolerating Diet Constipation    Other Objective: VITALS:  
Last 24hrs VS reviewed since prior progress note. Most recent are: 
Patient Vitals for the past 24 hrs: 
 Temp Pulse Resp BP SpO2  
18 1451 97.5 °F (36.4 °C) 75 18 136/71 96 % 18 1251 98.1 °F (36.7 °C) 67 18 148/79 99 % 18 0802 97.5 °F (36.4 °C) (!) 52 18 136/62 97 % 18 0351 97.5 °F (36.4 °C) 63  120/67 97 % 18 0116 97.4 °F (36.3 °C) 73 14 137/80 96 % 18 2300  60 16 124/86   
18 2200  70 19 107/82 (!) 86 % 11/30/18 2100  80 19 152/78 (!) 75 % Intake/Output Summary (Last 24 hours) at 12/1/2018 1640 Last data filed at 12/1/2018 5696 Gross per 24 hour Intake 665 ml Output 300 ml Net 365 ml PHYSICAL EXAM: 
General: WD, WN. Alert, cooperative, no acute distress   
EENT:  EOMI. Anicteric sclerae. MMM Resp:  CTA bilaterally, no wheezing or rales. No accessory muscle use CV:  Regular  rhythm,  No edema GI:  Soft, Non distended, Non tender.  +Bowel sounds Neurologic:  Alert and oriented X 2 Psych:     Not anxious nor agitated Skin:  no rashes or ulcers. No jaundice Reviewed most current lab test results and cultures  YES Reviewed most current radiology test results   YES Review and summation of old records today    NO Reviewed patient's current orders and MAR    YES 
PMH/ reviewed - no change compared to H&P 
________________________________________________________________________ Care Plan discussed with: 
  Comments Patient x Family RN x Care Manager Consultant:     
________________________________________________________________________ Total NON critical care TIME: 25  Minutes Total CRITICAL CARE TIME Spent:   Minutes non procedure based Comments >50% of visit spent in counseling and coordination of care    
________________________________________________________________________ Regina Saunders MD  
 
Procedures: see electronic medical records for all procedures/Xrays and details which were not copied into this note but were reviewed prior to creation of Plan. LABS: 
I reviewed today's most current labs and imaging studies. Pertinent labs include: 
Recent Labs 12/01/18 
0829 11/30/18 
1655 WBC 7.1 8.0 HGB 11.1* 11.7* HCT 33.3* 34.5*  
* 168 Recent Labs 12/01/18 
0829 11/30/18 
1655  138  
K 3.7 3.9 * 106 CO2 23 25 GLU 72 88 BUN 38* 42* CREA 2.09* 2.49* CA 8.1* 8.8 MG  --  2.4 ALB 3.2* 3.8 TBILI 1.0 1.1*  
SGOT 45* 46* ALT 19 21

## 2018-12-01 NOTE — ROUTINE PROCESS
Pt's close friend left nurse with this nurse that reads the following, \"My name is Koko zapata. I have been a friend/ care provider for Pratibha Hotrazewski for the last year. Even though I have no legal binding provision for Adriana Marts, my wife and I have provided meals and company for Adriana Marts and know more about his general well being than even his family. If you have any questions, you may contact me at [phone]. Thank you, Koko Camilo. I took Mr. Carlos Molinaow meds and clothing with me. Will bring clean clothing tomorrow. \" 
 
Letter put in patient's chart.

## 2018-12-01 NOTE — ROUTINE PROCESS
Bedside and Verbal shift change report given to 809 Aki Hudson (oncoming nurse) by Lieutenant Sheldon (offgoing nurse). Report included the following information SBAR, Kardex, ED Summary, MAR, Recent Results and Cardiac Rhythm NSR. Zone Phone:   [phone] Significant changes during shift:  Seen by PT, OT, neuro. Echo and MRI done. Waiting for EEG Patient Information Pam Stephenson 
80 y.o. 
11/30/2018  3:25 PM by Leonardo Richey MD. Pam Stephenson was admitted from [facility] 171 Problem List 
 
Patient Active Problem List  
 Diagnosis Date Noted  Change in mental status 11/30/2018  Inability to walk 11/22/2017  Coronary artery disease involving native coronary artery without angina pectoris 05/13/2016  S/P CABG (coronary artery bypass graft) 05/13/2016  
 Hiatal hernia with gastroesophageal reflux 05/13/2016  H/O endovascular stent graft for abdominal aortic aneurysm 05/13/2016  CKD (chronic kidney disease) stage 3, GFR 30-59 ml/min ([facility]) 05/13/2016  AAA (abdominal aortic aneurysm) ([facility] [address] 11/07/2014 Past Medical History:  
Diagnosis Date  Abdominal aortic aneurysm ([facility] [address]  Arthritis  CAD (coronary artery disease) bypass  Hypertension  Other ill-defined conditions(799.89)   
 gout  Other ill-defined conditions(799.89)   
 high cholesterol Core Measures: CVA: Yes Yes CHF:No No 
PNA:No No 
 
 
Activity Status: OOB to Chair No 
Ambulated this shift Yes Bed Rest No 
 
 
 
LINES AND DRAINS: 
 
PIV 
 
DVT prophylaxis: DVT prophylaxis Med- Yes DVT prophylaxis SCD or ALMAS- No  
 
 
Patient Safety: 
 
Falls Score Total Score: 4 Safety Level_______ Bed Alarm On? Yes Sitter? No 
 
Plan for upcoming shift: EEG, safety Discharge Plan: No 
 
Active Consults: 
IP CONSULT TO NEUROLOGY

## 2018-12-02 LAB
ALBUMIN SERPL-MCNC: 3 G/DL (ref 3.5–5)
ALBUMIN/GLOB SERPL: 0.9 {RATIO} (ref 1.1–2.2)
ALP SERPL-CCNC: 70 U/L (ref 45–117)
ALT SERPL-CCNC: 19 U/L (ref 12–78)
ANION GAP SERPL CALC-SCNC: 8 MMOL/L (ref 5–15)
APPEARANCE UR: ABNORMAL
AST SERPL-CCNC: 41 U/L (ref 15–37)
BACTERIA URNS QL MICRO: ABNORMAL /HPF
BASOPHILS # BLD: 0 K/UL (ref 0–0.1)
BASOPHILS NFR BLD: 0 % (ref 0–1)
BILIRUB SERPL-MCNC: 0.6 MG/DL (ref 0.2–1)
BILIRUB UR QL: NEGATIVE
BUN SERPL-MCNC: 38 MG/DL (ref 6–20)
BUN/CREAT SERPL: 19 (ref 12–20)
CALCIUM SERPL-MCNC: 8.1 MG/DL (ref 8.5–10.1)
CHLORIDE SERPL-SCNC: 112 MMOL/L (ref 97–108)
CHOLEST SERPL-MCNC: 189 MG/DL
CO2 SERPL-SCNC: 21 MMOL/L (ref 21–32)
COLOR UR: ABNORMAL
CREAT SERPL-MCNC: 1.98 MG/DL (ref 0.7–1.3)
DIFFERENTIAL METHOD BLD: ABNORMAL
EOSINOPHIL # BLD: 0.2 K/UL (ref 0–0.4)
EOSINOPHIL NFR BLD: 3 % (ref 0–7)
EPITH CASTS URNS QL MICRO: ABNORMAL /LPF
ERYTHROCYTE [DISTWIDTH] IN BLOOD BY AUTOMATED COUNT: 14.2 % (ref 11.5–14.5)
EST. AVERAGE GLUCOSE BLD GHB EST-MCNC: 108 MG/DL
GLOBULIN SER CALC-MCNC: 3.4 G/DL (ref 2–4)
GLUCOSE SERPL-MCNC: 106 MG/DL (ref 65–100)
GLUCOSE UR STRIP.AUTO-MCNC: NEGATIVE MG/DL
HBA1C MFR BLD: 5.4 % (ref 4.2–6.3)
HCT VFR BLD AUTO: 32.9 % (ref 36.6–50.3)
HDLC SERPL-MCNC: 37 MG/DL
HDLC SERPL: 5.1 {RATIO} (ref 0–5)
HGB BLD-MCNC: 10.8 G/DL (ref 12.1–17)
HGB UR QL STRIP: NEGATIVE
HYALINE CASTS URNS QL MICRO: ABNORMAL /LPF (ref 0–5)
IMM GRANULOCYTES # BLD: 0 K/UL (ref 0–0.04)
IMM GRANULOCYTES NFR BLD AUTO: 1 % (ref 0–0.5)
KETONES UR QL STRIP.AUTO: ABNORMAL MG/DL
LDLC SERPL CALC-MCNC: 124.8 MG/DL (ref 0–100)
LEUKOCYTE ESTERASE UR QL STRIP.AUTO: ABNORMAL
LIPID PROFILE,FLP: ABNORMAL
LYMPHOCYTES # BLD: 1 K/UL (ref 0.8–3.5)
LYMPHOCYTES NFR BLD: 17 % (ref 12–49)
MCH RBC QN AUTO: 34 PG (ref 26–34)
MCHC RBC AUTO-ENTMCNC: 32.8 G/DL (ref 30–36.5)
MCV RBC AUTO: 103.5 FL (ref 80–99)
MONOCYTES # BLD: 0.8 K/UL (ref 0–1)
MONOCYTES NFR BLD: 14 % (ref 5–13)
NEUTS SEG # BLD: 3.9 K/UL (ref 1.8–8)
NEUTS SEG NFR BLD: 66 % (ref 32–75)
NITRITE UR QL STRIP.AUTO: POSITIVE
NRBC # BLD: 0 K/UL (ref 0–0.01)
NRBC BLD-RTO: 0 PER 100 WBC
PH UR STRIP: 6.5 [PH] (ref 5–8)
PLATELET # BLD AUTO: 170 K/UL (ref 150–400)
PMV BLD AUTO: 10.2 FL (ref 8.9–12.9)
POTASSIUM SERPL-SCNC: 3.6 MMOL/L (ref 3.5–5.1)
PROT SERPL-MCNC: 6.4 G/DL (ref 6.4–8.2)
PROT UR STRIP-MCNC: ABNORMAL MG/DL
RBC # BLD AUTO: 3.18 M/UL (ref 4.1–5.7)
RBC #/AREA URNS HPF: ABNORMAL /HPF (ref 0–5)
SODIUM SERPL-SCNC: 141 MMOL/L (ref 136–145)
SP GR UR REFRACTOMETRY: 1.02 (ref 1–1.03)
TRIGL SERPL-MCNC: 136 MG/DL (ref ?–150)
UA: UC IF INDICATED,UAUC: ABNORMAL
UROBILINOGEN UR QL STRIP.AUTO: 0.2 EU/DL (ref 0.2–1)
VLDLC SERPL CALC-MCNC: 27.2 MG/DL
WBC # BLD AUTO: 5.9 K/UL (ref 4.1–11.1)
WBC URNS QL MICRO: >100 /HPF (ref 0–4)

## 2018-12-02 PROCEDURE — 74011250637 HC RX REV CODE- 250/637: Performed by: INTERNAL MEDICINE

## 2018-12-02 PROCEDURE — 65660000000 HC RM CCU STEPDOWN

## 2018-12-02 PROCEDURE — 87086 URINE CULTURE/COLONY COUNT: CPT

## 2018-12-02 PROCEDURE — 74011250636 HC RX REV CODE- 250/636: Performed by: INTERNAL MEDICINE

## 2018-12-02 PROCEDURE — 81001 URINALYSIS AUTO W/SCOPE: CPT

## 2018-12-02 PROCEDURE — 94760 N-INVAS EAR/PLS OXIMETRY 1: CPT

## 2018-12-02 PROCEDURE — 80053 COMPREHEN METABOLIC PANEL: CPT

## 2018-12-02 PROCEDURE — 85025 COMPLETE CBC W/AUTO DIFF WBC: CPT

## 2018-12-02 PROCEDURE — 74011250637 HC RX REV CODE- 250/637: Performed by: PSYCHIATRY & NEUROLOGY

## 2018-12-02 PROCEDURE — 83036 HEMOGLOBIN GLYCOSYLATED A1C: CPT

## 2018-12-02 PROCEDURE — 80061 LIPID PANEL: CPT

## 2018-12-02 PROCEDURE — 36415 COLL VENOUS BLD VENIPUNCTURE: CPT

## 2018-12-02 RX ORDER — AMLODIPINE BESYLATE 5 MG/1
5 TABLET ORAL DAILY
Status: DISCONTINUED | OUTPATIENT
Start: 2018-12-03 | End: 2018-12-04 | Stop reason: HOSPADM

## 2018-12-02 RX ADMIN — Medication 10 ML: at 17:08

## 2018-12-02 RX ADMIN — TAMSULOSIN HYDROCHLORIDE 0.4 MG: 0.4 CAPSULE ORAL at 10:16

## 2018-12-02 RX ADMIN — HEPARIN SODIUM 5000 UNITS: 5000 INJECTION INTRAVENOUS; SUBCUTANEOUS at 17:08

## 2018-12-02 RX ADMIN — LEVOTHYROXINE SODIUM 25 MCG: 25 TABLET ORAL at 07:00

## 2018-12-02 RX ADMIN — HEPARIN SODIUM 5000 UNITS: 5000 INJECTION INTRAVENOUS; SUBCUTANEOUS at 00:05

## 2018-12-02 RX ADMIN — MEMANTINE 10 MG: 10 TABLET ORAL at 10:16

## 2018-12-02 RX ADMIN — Medication 10 ML: at 22:28

## 2018-12-02 RX ADMIN — HEPARIN SODIUM 5000 UNITS: 5000 INJECTION INTRAVENOUS; SUBCUTANEOUS at 09:11

## 2018-12-02 RX ADMIN — MEMANTINE 10 MG: 10 TABLET ORAL at 17:07

## 2018-12-02 RX ADMIN — ASPIRIN 81 MG 81 MG: 81 TABLET ORAL at 10:16

## 2018-12-02 RX ADMIN — Medication 10 ML: at 05:28

## 2018-12-02 NOTE — PROGRESS NOTES
Chief Complaint: altered mental status Laying in bed no distress. No  New complaints chart reviewed. Assesment and Plan 1. Altered mental status, unspecified altered mental status type Differential is seizure. Stroke dehydration EEG 
MRI brain no new findings Rehydrate 
thiamine 
  
2. Dementia Continue memantine  
  
3. Hypertension 
continue amlodipine 
  
5. Gout Allopurinol held 
  
6. Hypothyroidism Continue evoxyl 
  
 
 
Allergies Patient has no known allergies. Medications Current Facility-Administered Medications Medication Dose Route Frequency  [START ON 12/3/2018] amLODIPine (NORVASC) tablet 5 mg  5 mg Oral DAILY  memantine (NAMENDA) tablet 10 mg  10 mg Oral BID  tamsulosin (FLOMAX) capsule 0.4 mg  0.4 mg Oral DAILY  sodium chloride (NS) flush 5-10 mL  5-10 mL IntraVENous Q8H  
 sodium chloride (NS) flush 5-10 mL  5-10 mL IntraVENous PRN  
 acetaminophen (TYLENOL) tablet 650 mg  650 mg Oral Q6H PRN  
 heparin (porcine) injection 5,000 Units  5,000 Units SubCUTAneous Q8H  
 sodium chloride (NS) flush 5-10 mL  5-10 mL IntraVENous Q8H  
 sodium chloride (NS) flush 5-10 mL  5-10 mL IntraVENous PRN  
 acetaminophen (TYLENOL) tablet 650 mg  650 mg Oral Q4H PRN Or  
 acetaminophen (TYLENOL) solution 650 mg  650 mg Per NG tube Q4H PRN Or  
 acetaminophen (TYLENOL) suppository 650 mg  650 mg Rectal Q4H PRN  
 aspirin chewable tablet 81 mg  81 mg Oral DAILY  labetalol (NORMODYNE;TRANDATE) injection 5 mg  5 mg IntraVENous Q10MIN PRN  
 0.9% sodium chloride infusion  100 mL/hr IntraVENous CONTINUOUS  
 levothyroxine (SYNTHROID) tablet 25 mcg  25 mcg Oral 6am  
 lactated Ringers infusion 1,000 mL  1,000 mL IntraVENous CONTINUOUS Medical History Past Medical History:  
Diagnosis Date  Abdominal aortic aneurysm ([facility] [address]  Arthritis  CAD (coronary artery disease) bypass  Hypertension  Other ill-defined conditions(199.89)   
 gout  Other ill-defined conditions(799.89)   
 high cholesterol Review of Systems Unable to perform ROS: Dementia Exam: 
 
Visit Vitals /77 Pulse 65 Temp 98.1 °F (36.7 °C) Resp 18 Ht 6' (1.829 m) Wt 179 lb 14.3 oz (81.6 kg) SpO2 98% BMI 24.40 kg/m² General: Well developed, well nourished. Patient in no apparent distress Head: Normocephalic, atraumatic, anicteric sclera Neck Normal ROM, No thyromegally Lungs:  Clear to auscultation bilaterally, No wheezes or rubs Cardiac: Regular rate and rhythm with no murmurs. Abd: Bowel sounds were audible. No tenderness on palpation Ext: No pedal edema swollen PIP joints Skin: Supple no rash, tophi on distal PIP joints of the hands  
  
NeurologicExam: 
Mental Status: Alert and oriented to person only Speech: Fluent no aphasia or dysarthria. Cranial Nerves:  II - XII Intact Motor:  Full and symmetric strength of upper and lower proximal and distal muscles. Normal bulk and tone. Reflexes:   Deep tendon reflexes 1+/4 and symmetric. Sensory:   Symmetric distal reduction in sensory perception affecting all modalities. Gait:  deferred. Tremor:   No tremor noted. Imaging CT Results (most recent): 
Results from Hospital Encounter encounter on 11/30/18 CT SPINE CERV WO CONT Narrative EXAM:  CT CERVICAL SPINE WITHOUT CONTRAST INDICATION:   fall. Altered mental status COMPARISON: None. CONTRAST:  None. TECHNIQUE: Multislice helical CT of the cervical spine was performed without 
intravenous contrast administration. Sagittal and coronal reconstructions were 
generated. CT dose reduction was achieved through use of a standardized 
protocol tailored for this examination and automatic exposure control for dose 
modulation. FINDINGS: 
 
The alignment is within normal limits. There is no fracture or subluxation. The 
odontoid process is intact.  The craniocervical junction is within normal limits. The prevertebral soft tissues are within normal limits. Spondylosis is noted at C4-5, C5-6, and C6-7 with posterior osteophyte/disc bulge complexes causing 
mild-to-moderate spinal stenosis. Bilateral neural foraminal narrowing is noted 
at these levels secondary to uncovertebral osteophyte formation. Impression IMPRESSION: 
Spondylitic changes without acute abnormality. MRI Results (most recent): 
Results from Hospital Encounter encounter on 11/30/18 MRI BRAIN WO CONT Narrative *PRELIMINARY REPORT* No acute intracranial abnormality. Age-related volume loss and nonspecific white 
matter disease. Preliminary report was provided by Dr. Steffi Cardoso, the on-call radiologist, at [phone] 
hours. Final report to follow. *END PRELIMINARY REPORT* INDICATION:  Decreased alertness Sagittal, axial, and coronal MRI of the brain was performed without contrast. 
 
FINDINGS: There are multiple foci of high signal intensity in the deep white 
matter bilaterally. Findings are nonspecific but usually associated with chronic 
ischemic change. There is no intracranial mass or hemorrhage. The signal void in 
the vessels at the base of the brain is present and normal. No evidence for 
restricted diffusion. Impression IMPRESSION: Nonspecific high signal in the deep white matter likely due to 
chronic ischemic change. Otherwise negative. Lab Review Lab Results Component Value Date/Time WBC 5.9 12/02/2018 05:26 AM  
 HCT 32.9 (L) 12/02/2018 05:26 AM  
 HGB 10.8 (L) 12/02/2018 05:26 AM  
 PLATELET 431 04/07/9031 05:26 AM  
 
 
Lab Results Component Value Date/Time Sodium 141 12/02/2018 05:26 AM  
 Potassium 3.6 12/02/2018 05:26 AM  
 Chloride 112 (H) 12/02/2018 05:26 AM  
 CO2 21 12/02/2018 05:26 AM  
 Glucose 106 (H) 12/02/2018 05:26 AM  
 BUN 38 (H) 12/02/2018 05:26 AM  
 Creatinine 1.98 (H) 12/02/2018 05:26 AM  
 Calcium 8.1 (L) 12/02/2018 05:26 AM  
 
 
 
 
Lab Results Component Value Date/Time Hemoglobin A1c 5.4 12/02/2018 05:26 AM  
  
 
Lab Results Component Value Date/Time Vitamin B12 659 12/01/2018 04:48 AM  
 Folate 15.1 11/26/2017 05:14 AM  
 
 
Lab Results Component Value Date/Time  Cholesterol, total 189 12/02/2018 05:26 AM  
 HDL Cholesterol 37 12/02/2018 05:26 AM  
 LDL, calculated 124.8 (H) 12/02/2018 05:26 AM  
 VLDL, calculated 27.2 12/02/2018 05:26 AM  
 Triglyceride 136 12/02/2018 05:26 AM  
 CHOL/HDL Ratio 5.1 (H) 12/02/2018 05:26 AM

## 2018-12-02 NOTE — PROGRESS NOTES
Hospitalist Progress NoteNAME: Ayde Mendoza :  3/15/1933 MRN:  [id number] Interim Hospital Summary: 80 y.o. male whom presented on 2018 with Assessment / Plan: 1. Acute encephalopathy(POA): Improved today. Possibly metabolic vs infectious. CT head negative. MRI with no acute findings, ECHO with EF of 55-60%. Neurology consulted. Ucx pending. 2. HTN: holding home meds due to hypotension, but now BP improved and will start on low dose amlodipine. BB and lasix held. 3. BPH: on Flomax 0.4 mg daily 4. Dementia: on Namenda 10 mg daily 5. Gout: holding allopurinol because of worsening renal function 6. Acute on chronic renal failure: on IVF, renally dose meds. Cr trending down 7. Diarrhea : no stool study sent, none today per pt, will follow  
  Left a  for his dtr Rudy . Code Status: full code Surrogate Decision Maker: 
  
DVT Prophylaxis: heparin GI Prophylaxis: not indicated 
  
Baseline: live in assisting living Subjective: Chief Complaint / Reason for Physician Visit More alert today. Denies any headache/N/V/SOB. Says he did not like the food here. Discussed with RN events overnight. Review of Systems: 
Symptom Y/N Comments  Symptom Y/N Comments Fever/Chills n   Chest Pain n   
Poor Appetite n   Edema n   
Cough n   Abdominal Pain n   
Sputum n   Joint Pain SOB/URENA n   Pruritis/Rash Nausea/vomit n   Tolerating PT/OT Diarrhea n   Tolerating Diet Constipation    Other Objective: VITALS:  
Last 24hrs VS reviewed since prior progress note. Most recent are: 
Patient Vitals for the past 24 hrs: 
 Temp Pulse Resp BP SpO2  
18 1152 98.1 °F (36.7 °C) 65 18 134/77 98 % 18 0736 98.8 °F (37.1 °C) 61 18 128/67 95 % 18 0404 98.7 °F (37.1 °C) (!) 56 18 129/62 95 % 18 2341 98.6 °F (37 °C) 63 18 109/56 96 % 12/01/18 1936 97.9 °F (36.6 °C) 66 18 105/74 98 % 12/01/18 1451 97.5 °F (36.4 °C) 75 18 136/71 96 % 12/01/18 1251 98.1 °F (36.7 °C) 67 18 148/79 99 % Intake/Output Summary (Last 24 hours) at 12/2/2018 1239 Last data filed at 12/2/2018 6854 Gross per 24 hour Intake 2000 ml Output 400 ml Net 1600 ml PHYSICAL EXAM: 
General: WD, WN. Alert, cooperative, no acute distress   
EENT:  EOMI. Anicteric sclerae. MMM Resp:  CTA bilaterally, no wheezing or rales. No accessory muscle use CV:  Regular  rhythm,  No edema GI:  Soft, Non distended, Non tender.  +Bowel sounds Neurologic:  Alert and oriented X 2 Psych:     Not anxious nor agitated Skin:  no rashes or ulcers. No jaundice Reviewed most current lab test results and cultures  YES Reviewed most current radiology test results   YES Review and summation of old records today    NO Reviewed patient's current orders and MAR    YES 
PMH/ reviewed - no change compared to H&P 
________________________________________________________________________ Care Plan discussed with: 
  Comments Patient x Family RN x Care Manager Consultant:     
________________________________________________________________________ Total NON critical care TIME: 25  Minutes Total CRITICAL CARE TIME Spent:   Minutes non procedure based Comments >50% of visit spent in counseling and coordination of care    
________________________________________________________________________ Frances Leung MD  
 
Procedures: see electronic medical records for all procedures/Xrays and details which were not copied into this note but were reviewed prior to creation of Plan. LABS: 
I reviewed today's most current labs and imaging studies. Pertinent labs include: 
Recent Labs 12/02/18 
0526 12/01/18 
0829 11/30/18 
1655 WBC 5.9 7.1 8.0 HGB 10.8* 11.1* 11.7* HCT 32.9* 33.3* 34.5*  
 148* 168 Recent Labs 12/02/18 
0526 12/01/18 
0829 11/30/18 
1655  142 138  
K 3.6 3.7 3.9 * 110* 106 CO2 21 23 25 * 72 88 BUN 38* 38* 42* CREA 1.98* 2.09* 2.49* CA 8.1* 8.1* 8.8 MG  --   --  2.4 ALB 3.0* 3.2* 3.8 TBILI 0.6 1.0 1.1*  
SGOT 41* 45* 46* ALT 19 19 21

## 2018-12-02 NOTE — ROUTINE PROCESS
Bedside and Verbal shift change report given to Shaheen Mendez (oncoming nurse) by Ronna Deal (offgoing nurse). Report included the following information SBAR, Kardex, ED Summary, MAR, Recent Results and Cardiac Rhythm NSR. 
  
Zone Phone:   [phone] 
  
  
Significant changes during shift:  Seen by neuro 
  
  
  
Patient Information 
  
Sabino Claude 
80 y.o. 
11/30/2018  3:25 PM by Zarina Real MD. Sabino Claude was admitted from [address] 
Problem List 
  
    
Patient Active Problem List  
  Diagnosis Date Noted  Change in mental status 11/30/2018  Inability to walk 11/22/2017  Coronary artery disease involving native coronary artery without angina pectoris 05/13/2016  S/P CABG (coronary artery bypass graft) 05/13/2016  
 Hiatal hernia with gastroesophageal reflux 05/13/2016  H/O endovascular stent graft for abdominal aortic aneurysm 05/13/2016  CKD (chronic kidney disease) stage 3, GFR 30-59 ml/min ([facility]) 05/13/2016  AAA (abdominal aortic aneurysm) ([facility] [address] 11/07/2014  
  
    
Past Medical History:  
Diagnosis Date  Abdominal aortic aneurysm (HCC)    
 Arthritis    
 CAD (coronary artery disease)    
  bypass  Hypertension    
 Other ill-defined conditions(799.89)    
  gout  Other ill-defined conditions(799.89)    
  high cholesterol  
  
  
  
Core Measures: 
  
CVA: Yes Yes CHF:No No 
PNA:No No 
  
  
Activity Status: 
  
OOB to Chair No 
Ambulated this shift Yes Bed Rest No 
  
  
  
LINES AND DRAINS: 
  
PIV 
  
DVT prophylaxis: 
  
DVT prophylaxis Med- Yes DVT prophylaxis SCD or ALMAS- No  
  
  
Patient Safety: 
  
Falls Score Total Score: 4 Safety Level_______ Bed Alarm On? Yes Sitter?  No 
  
Plan for upcoming shift: EEG, safety 
  
  
  
Discharge Plan: No 
  
Active Consults: 
IP CONSULT TO NEUROLOGY

## 2018-12-03 LAB
BACTERIA SPEC CULT: NORMAL
CC UR VC: NORMAL
SERVICE CMNT-IMP: NORMAL

## 2018-12-03 PROCEDURE — 92610 EVALUATE SWALLOWING FUNCTION: CPT

## 2018-12-03 PROCEDURE — 74011250637 HC RX REV CODE- 250/637: Performed by: PSYCHIATRY & NEUROLOGY

## 2018-12-03 PROCEDURE — 94760 N-INVAS EAR/PLS OXIMETRY 1: CPT

## 2018-12-03 PROCEDURE — 74011250637 HC RX REV CODE- 250/637: Performed by: FAMILY MEDICINE

## 2018-12-03 PROCEDURE — 65660000000 HC RM CCU STEPDOWN

## 2018-12-03 PROCEDURE — 74011250636 HC RX REV CODE- 250/636: Performed by: INTERNAL MEDICINE

## 2018-12-03 PROCEDURE — 74011250637 HC RX REV CODE- 250/637: Performed by: INTERNAL MEDICINE

## 2018-12-03 RX ORDER — DONEPEZIL HYDROCHLORIDE 5 MG/1
5 TABLET, FILM COATED ORAL
Status: DISCONTINUED | OUTPATIENT
Start: 2018-12-03 | End: 2018-12-04 | Stop reason: HOSPADM

## 2018-12-03 RX ADMIN — MEMANTINE 10 MG: 10 TABLET ORAL at 19:26

## 2018-12-03 RX ADMIN — Medication 10 ML: at 06:59

## 2018-12-03 RX ADMIN — HEPARIN SODIUM 5000 UNITS: 5000 INJECTION INTRAVENOUS; SUBCUTANEOUS at 15:41

## 2018-12-03 RX ADMIN — DONEPEZIL HYDROCHLORIDE 5 MG: 5 TABLET, FILM COATED ORAL at 22:34

## 2018-12-03 RX ADMIN — AMLODIPINE BESYLATE 5 MG: 5 TABLET ORAL at 09:21

## 2018-12-03 RX ADMIN — ASPIRIN 81 MG 81 MG: 81 TABLET ORAL at 09:21

## 2018-12-03 RX ADMIN — SODIUM CHLORIDE 100 ML/HR: 900 INJECTION, SOLUTION INTRAVENOUS at 09:24

## 2018-12-03 RX ADMIN — HEPARIN SODIUM 5000 UNITS: 5000 INJECTION INTRAVENOUS; SUBCUTANEOUS at 09:21

## 2018-12-03 RX ADMIN — LEVOTHYROXINE SODIUM 25 MCG: 25 TABLET ORAL at 06:59

## 2018-12-03 RX ADMIN — TAMSULOSIN HYDROCHLORIDE 0.4 MG: 0.4 CAPSULE ORAL at 09:21

## 2018-12-03 RX ADMIN — Medication 10 ML: at 22:34

## 2018-12-03 RX ADMIN — MEMANTINE 10 MG: 10 TABLET ORAL at 09:21

## 2018-12-03 NOTE — PROGRESS NOTES
Bedside and Verbal shift change report given to Eden Benson (oncoming nurse) by Seun Mckenna RN (offgoing nurse). Report included the following information SBAR, Kardex, ED Summary, MAR, Recent Results and Cardiac Rhythm NSR. 
  
Zone Phone:  
  
  
Significant changes during shift:  none 
  
  
  
Patient Information 
  
Red Dennison 
80 y.o. 
11/30/2018  3:25 PM by Jian Nye MD. [facility] 
[facility] List 
  
    
Patient Active Problem List  
  Diagnosis Date Noted  Change in mental status 11/30/2018  Inability to walk 11/22/2017  Coronary artery disease involving native coronary artery without angina pectoris 05/13/2016  S/P CABG (coronary artery bypass graft) 05/13/2016  
 Hiatal hernia with gastroesophageal reflux 05/13/2016  H/O endovascular stent graft for abdominal aortic aneurysm 05/13/2016  CKD (chronic kidney disease) stage 3, GFR 30-59 ml/min ([facility]) 05/13/2016  AAA (abdominal aortic aneurysm) ([facility] [address] 11/07/2014  
  
    
Past Medical History:  
Diagnosis Date  Abdominal aortic aneurysm (HCC)    
 Arthritis    
 CAD (coronary artery disease)    
  bypass  Hypertension    
 Other ill-defined conditions(799.89)    
  gout  Other ill-defined conditions(799.89)    
  high cholesterol  
  
  
  
Core Measures: 
  
CVA: Yes Yes CHF:No No 
PNA:No No 
  
  
Activity Status: 
  
OOB to Chair No 
Ambulated this shift Yes Bed Rest No 
  
  
  
LINES AND DRAINS: 
  
PIV 
  
DVT prophylaxis: 
  
DVT prophylaxis Med- Yes DVT prophylaxis SCD or ALMAS- No  
  
  
Patient Safety: 
  
Falls Score Total Score: 5 Safety Level_______ Bed Alarm On? Yes Sitter? No 
  
Plan for upcoming shift safety 
  
  
  
Discharge Plan:placement CM working on it 
  
Active Consults: 
IP CONSULT TO NEUROLOGY

## 2018-12-03 NOTE — ROUTINE PROCESS
Bedside and Verbal shift change report given to Heri Alfaro 8141 (oncoming nurse) by Ashtyn Echeverria (offgoing nurse). Report included the following information SBAR, Kardex, ED Summary, MAR, Recent Results and Cardiac Rhythm NSR. Zone Phone:   [phone] Significant changes during shift:  Seen by PT, OT, neuro. Echo and MRI done. Waiting for EEG Patient Information Mendoza Mast 
80 y.o. 
11/30/2018  3:25 PM by Niurka Hernandez MD. Mendoza Mast was admitted from [facility] 171 Problem List 
 
Patient Active Problem List  
 Diagnosis Date Noted  Change in mental status 11/30/2018  Inability to walk 11/22/2017  Coronary artery disease involving native coronary artery without angina pectoris 05/13/2016  S/P CABG (coronary artery bypass graft) 05/13/2016  
 Hiatal hernia with gastroesophageal reflux 05/13/2016  H/O endovascular stent graft for abdominal aortic aneurysm 05/13/2016  CKD (chronic kidney disease) stage 3, GFR 30-59 ml/min ([facility]) 05/13/2016  AAA (abdominal aortic aneurysm) ([facility] [address] 11/07/2014 Past Medical History:  
Diagnosis Date  Abdominal aortic aneurysm ([facility] [address]  Arthritis  CAD (coronary artery disease) bypass  Hypertension  Other ill-defined conditions(799.89)   
 gout  Other ill-defined conditions(799.89)   
 high cholesterol Core Measures: CVA: Yes Yes CHF:No No 
PNA:No No 
 
 
Activity Status: OOB to Chair No 
Ambulated this shift Yes Bed Rest No 
 
 
 
LINES AND DRAINS: 
PIV 
 
DVT prophylaxis: DVT prophylaxis Med- Yes DVT prophylaxis SCD or ALMAS- No  
 
 
Patient Safety: 
 
Falls Score Total Score: 5 Safety Level_______ Bed Alarm On? Yes Sitter? No 
 
Plan for upcoming shift: EEG, safety Discharge Plan: No 
 
Active Consults: 
IP CONSULT TO NEUROLOGY

## 2018-12-03 NOTE — PROGRESS NOTES
Speech Pathology bedside swallow evaluation/discharge Patient: Donnell Carias (71 y.o. male) Date: 12/3/2018 Primary Diagnosis: Change in mental status [R41.82] Precautions:  Fall ASSESSMENT : 
Based on the objective data described below, the patient presents with functional basic language and motor speech. He tolerated his breakfast well. Ingested kasi crackers and milk with no oropharyngeal dysphagia noted. Skilled therapy provided by a speech-language pathologist is not indicated at this time. PLAN : 
Recommendations: 
Continue with current diet Discharge Recommendations: None SUBJECTIVE:  
Patient stated he lived in an apt and was alone at night but not during the day. OBJECTIVE:  
 
Past Medical History:  
Diagnosis Date  Abdominal aortic aneurysm ([address]  Arthritis  CAD (coronary artery disease) bypass  Hypertension  Other ill-defined conditions(799.89)   
 gout  Other ill-defined conditions(799.89)   
 high cholesterol Past Surgical History:  
Procedure Laterality Date  ABDOMEN SURGERY PROC UNLISTED    
 hernia  CARDIAC SURG PROCEDURE UNLIST    
 bypass surgery  HX CATARACT REMOVAL    
 HX TONSILLECTOMY Prior Level of Function/Home Situation:  
Home Situation Home Environment: Assisted living(per chart, patient confused) One/Two Story Residence: One story Living Alone: Yes Support Systems: Family member(s) Patient Expects to be Discharged to[de-identified] Unknown Diet prior to admission:  
Current Diet:  Reg/thins Cognitive and Communication Status: 
Neurologic State: Alert Orientation Level: Oriented to person, Oriented to place, Disoriented to time, Disoriented to situation Cognition: Follows commands, Impaired decision making Perception: Appears intact Perseveration: No perseveration noted Oral Assessment: 
Oral Assessment Labial: No impairment Dentition: Natural;Extractions Lingual: No impairment Velum: Other (comment) Mandible: No impairment P.O. Trials: 
  
Vocal quality prior to P.O.: No impairment Consistency Presented: Thin liquid; Solid How Presented: Self-fed/presented;Cup/sip; Successive swallows Bolus Acceptance: No impairment Bolus Formation/Control: No impairment Propulsion: No impairment Oral Residue: None Initiation of Swallow: No impairment Laryngeal Elevation: Functional 
Aspiration Signs/Symptoms: None Oral Phase Severity: No impairment Pharyngeal Phase Severity : No impairment NOMS:  
The NOMS functional outcome measure was used to quantify this patient's level of swallowing impairment. Based on the NOMS, the patient was determined to be at level 7 for swallow function G Codes: In compliance with CMSs Claims Based Outcome Reporting, the following G-code set was chosen for this patient based the use of the NOMS functional outcome to quantify this patient's level of swallowing impairment. Using the NOMS, the patient was determined to be at level 7 for swallow function which correlates with the CH= 0% level of severity. Based on the objective assessment provided within this note, the current, goal, and discharge g-codes are as follows: 
 
Swallow  Swallowing: 
 Swallow Current Status CH= 0% 
 Swallow Goal Status CH= 0% 
 Swallow D/C Status CH= 0% NOMS Swallowing Levels: 
Level 1 (CN): NPO Level 2 (CM): NPO but takes consistency in therapy Level 3 (CL): Takes less than 50% of nutrition p.o. and continues with nonoral feedings; and/or safe with mod cues; and/or max diet restriction Level 4 (CK): Safe swallow but needs mod cues; and/or mod diet restriction; and/or still requires some nonoral feeding/supplements Level 5 (CJ): Safe swallow with min diet restriction; and/or needs min cues Level 6 (CI): Independent with p.o.; rare cues; usually self cues; may need to avoid some foods or needs extra time Level 7 (CH): Independent for all p.o. KAREN. (2003). National Outcomes Measurement System (NOMS): Adult Speech-Language Pathology User's Guide. Pain: 
Pain Scale 1: Numeric (0 - 10) Pain Intensity 1: 0 After treatment:  
[] Patient left in no apparent distress sitting up in chair 
[x] Patient left in no apparent distress in bed 
[x] Call bell left within reach [x] Nursing notified 
[] Caregiver present 
[] Bed alarm activated COMMUNICATION/EDUCATION:  
The patients plan of care including findings, recommendations, and recommended diet changes were discussed with: Registered Nurse. 
 
[] Posted safety precautions in patient's room. [x] Patient/family have participated as able and agree with findings and recommendations. [] Patient is unable to participate in plan of care at this time. Thank you for this referral. 
Tania Kelsey, SLP Time Calculation: 10 mins

## 2018-12-03 NOTE — PROGRESS NOTES
Daughter informed nurse that she prefers pt to go to SNF closer to her in [place], [place]. CM attempted to contact pt's daughter and son to discuss discharge plan - unable to reach daughter, left message for son. CM sent referrals to the following SNFs via [employer]: [facility] ([address] [place] [address]), [facility], [facility], and [place]. BLADE Wen, [facility] Care Management [phone]

## 2018-12-03 NOTE — ROUTINE PROCESS
Bedside and Verbal shift change report given to [facility] (oncoming nurse) by Avni Marmolejo (offgoing nurse). Report included the following information SBAR, Kardex, ED Summary, MAR, Recent Results and Cardiac Rhythm NSR. Zone Phone:   [phone] Significant changes during shift:  Seen by PT, OT, neuro. Echo and MRI done. Waiting for EEG Patient Information Corie Garcia 
80 y.o. 
11/30/2018  3:25 PM by Luis Hoffman MD. Corie Garcia was admitted from [facility] Problem List 
 
Patient Active Problem List  
 Diagnosis Date Noted  Change in mental status 11/30/2018  Inability to walk 11/22/2017  Coronary artery disease involving native coronary artery without angina pectoris 05/13/2016  S/P CABG (coronary artery bypass graft) 05/13/2016  
 Hiatal hernia with gastroesophageal reflux 05/13/2016  H/O endovascular stent graft for abdominal aortic aneurysm 05/13/2016  CKD (chronic kidney disease) stage 3, GFR 30-59 ml/min ([facility]) 05/13/2016  AAA (abdominal aortic aneurysm) ([facility] 11/07/2014 Past Medical History:  
Diagnosis Date  Abdominal aortic aneurysm ([facility]  Arthritis  CAD (coronary artery disease) bypass  Hypertension  Other ill-defined conditions(799.89)   
 gout  Other ill-defined conditions(799.89)   
 high cholesterol Core Measures: CVA: Yes Yes CHF:No No 
PNA:No No 
 
 
Activity Status: OOB to Chair No 
Ambulated this shift Yes Bed Rest No 
 
 
 
LINES AND DRAINS: 
PIV 
 
DVT prophylaxis: DVT prophylaxis Med- Yes DVT prophylaxis SCD or ALMAS- No  
 
 
Patient Safety: 
 
Falls Score Total Score: 4 Safety Level_______ Bed Alarm On? Yes Sitter? No 
 
Plan for upcoming shift: EEG, safety Discharge Plan: No 
 
Active Consults: 
IP CONSULT TO NEUROLOGY

## 2018-12-03 NOTE — PROGRESS NOTES
Chief Complaint: altered mental status Laying in bed no distress. No  New complaints chart reviewed. Spoke with friend who has cared for him over the years. States that for the past year has been progressively losing cognitive ground. He has been wholly dependant on him for food and this admission resulted after not checking on him for three days. He states that his daughter and son live in [place] and they have been asking to Mr. Zoe Holbrook to come down to stay with them. Mr. Zoe Holbrook has turned down their offer. His friend states that Mr. Olu Brady children are both trustworthy and capable and they had taken care of Mr. Olu Brady wife before she succumbed. Assesment and Plan 1. Altered mental status, unspecified altered mental status type Differential is seizure. Stroke dehydration EEG 
MRI brain no new findings Rehydrate 
thiamine 
  
2. Dementia Continue memantine Add donepezil 
  
3. Hypertension 
continue amlodipine 
  
4. Gout Allopurinol held 
  
5. Hypothyroidism Continue levoxyl 
  
 
 
Allergies Patient has no known allergies. Medications Current Facility-Administered Medications Medication Dose Route Frequency  amLODIPine (NORVASC) tablet 5 mg  5 mg Oral DAILY  memantine (NAMENDA) tablet 10 mg  10 mg Oral BID  tamsulosin (FLOMAX) capsule 0.4 mg  0.4 mg Oral DAILY  sodium chloride (NS) flush 5-10 mL  5-10 mL IntraVENous Q8H  
 sodium chloride (NS) flush 5-10 mL  5-10 mL IntraVENous PRN  
 acetaminophen (TYLENOL) tablet 650 mg  650 mg Oral Q6H PRN  
 heparin (porcine) injection 5,000 Units  5,000 Units SubCUTAneous Q8H  
 sodium chloride (NS) flush 5-10 mL  5-10 mL IntraVENous Q8H  
 sodium chloride (NS) flush 5-10 mL  5-10 mL IntraVENous PRN  
 acetaminophen (TYLENOL) tablet 650 mg  650 mg Oral Q4H PRN Or  
 acetaminophen (TYLENOL) solution 650 mg  650 mg Per NG tube Q4H PRN  Or  
 acetaminophen (TYLENOL) suppository 650 mg  650 mg Rectal Q4H PRN  
  aspirin chewable tablet 81 mg  81 mg Oral DAILY  labetalol (NORMODYNE;TRANDATE) injection 5 mg  5 mg IntraVENous Q10MIN PRN  
 0.9% sodium chloride infusion  100 mL/hr IntraVENous CONTINUOUS  
 levothyroxine (SYNTHROID) tablet 25 mcg  25 mcg Oral 6am  
 lactated Ringers infusion 1,000 mL  1,000 mL IntraVENous CONTINUOUS Medical History Past Medical History:  
Diagnosis Date  Ab[facility] [address]  Arthritis  CAD (coronary artery disease) bypass  Hypertension  Other ill-defined conditions(799.89)   
 gout  Other ill-defined conditions(799.89)   
 high cholesterol Review of Systems Unable to perform ROS: Dementia Exam: 
 
Visit Vitals /79 (BP 1 Location: Left arm, BP Patient Position: At rest) Pulse (!) 52 Temp 97.4 °F (36.3 °C) Resp 18 Ht 6' (1.829 m) Wt 179 lb 14.3 oz (81.6 kg) SpO2 98% BMI 24.40 kg/m² General: Well developed, well nourished. Patient in no apparent distress Head: Normocephalic, atraumatic, anicteric sclera Neck Normal ROM, No thyromegally Lungs:  Clear to auscultation bilaterally, No wheezes or rubs Cardiac: Regular rate and rhythm with no murmurs. Abd: Bowel sounds were audible. No tenderness on palpation Ext: No pedal edema swollen PIP joints Skin: Supple no rash, tophi on distal PIP joints of the hands  
  
NeurologicExam: 
Mental Status: Alert and oriented to person only Speech: Fluent no aphasia or dysarthria. Cranial Nerves:  II - XII Intact Motor:  Full and symmetric strength of upper and lower proximal and distal muscles. Normal bulk and tone. Reflexes:   Deep tendon reflexes 1+/4 and symmetric. Sensory:   Symmetric distal reduction in sensory perception affecting all modalities. Gait:  deferred. Tremor:   No tremor noted. Imaging CT Results (most recent): 
Results from Hospital Encounter encounter on 11/30/18 CT SPINE CERV WO CONT Narrative EXAM:  CT CERVICAL SPINE WITHOUT CONTRAST INDICATION:   fall. Altered mental status COMPARISON: None. CONTRAST:  None. TECHNIQUE: Multislice helical CT of the cervical spine was performed without 
intravenous contrast administration. Sagittal and coronal reconstructions were 
generated. CT dose reduction was achieved through use of a standardized 
protocol tailored for this examination and automatic exposure control for dose 
modulation. FINDINGS: 
 
The alignment is within normal limits. There is no fracture or subluxation. The 
odontoid process is intact. The craniocervical junction is within normal limits. The prevertebral soft tissues are within normal limits. Spondylosis is noted at C4-5, C5-6, and C6-7 with posterior osteophyte/disc bulge complexes causing 
mild-to-moderate spinal stenosis. Bilateral neural foraminal narrowing is noted 
at these levels secondary to uncovertebral osteophyte formation. Impression IMPRESSION: 
Spondylitic changes without acute abnormality. MRI Results (most recent): 
Results from Hospital Encounter encounter on 11/30/18 MRI BRAIN WO CONT Narrative *PRELIMINARY REPORT* No acute intracranial abnormality. Age-related volume loss and nonspecific white 
matter disease. Preliminary report was provided by Dr. Martin Cruz, the on-call radiologist, at [phone] 
hours. Final report to follow. *END PRELIMINARY REPORT* INDICATION:  Decreased alertness Sagittal, axial, and coronal MRI of the brain was performed without contrast. 
 
FINDINGS: There are multiple foci of high signal intensity in the deep white 
matter bilaterally. Findings are nonspecific but usually associated with chronic 
ischemic change. There is no intracranial mass or hemorrhage. The signal void in 
the vessels at the base of the brain is present and normal. No evidence for 
restricted diffusion. Impression IMPRESSION: Nonspecific high signal in the deep white matter likely due to 
chronic ischemic change. Otherwise negative. Lab Review Lab Results Component Value Date/Time WBC 5.9 12/02/2018 05:26 AM  
 HCT 32.9 (L) 12/02/2018 05:26 AM  
 HGB 10.8 (L) 12/02/2018 05:26 AM  
 PLATELET 353 70/24/8088 05:26 AM  
 
 
Lab Results Component Value Date/Time Sodium 141 12/02/2018 05:26 AM  
 Potassium 3.6 12/02/2018 05:26 AM  
 Chloride 112 (H) 12/02/2018 05:26 AM  
 CO2 21 12/02/2018 05:26 AM  
 Glucose 106 (H) 12/02/2018 05:26 AM  
 BUN 38 (H) 12/02/2018 05:26 AM  
 Creatinine 1.98 (H) 12/02/2018 05:26 AM  
 Calcium 8.1 (L) 12/02/2018 05:26 AM  
 
 
 
 
Lab Results Component Value Date/Time Hemoglobin A1c 5.4 12/02/2018 05:26 AM  
  
 
Lab Results Component Value Date/Time Vitamin B12 659 12/01/2018 04:48 AM  
 Folate 15.1 11/26/2017 05:14 AM  
 
 
Lab Results Component Value Date/Time  Cholesterol, total 189 12/02/2018 05:26 AM  
 HDL Cholesterol 37 12/02/2018 05:26 AM  
 LDL, calculated 124.8 (H) 12/02/2018 05:26 AM  
 VLDL, calculated 27.2 12/02/2018 05:26 AM  
 Triglyceride 136 12/02/2018 05:26 AM  
 CHOL/HDL Ratio 5.1 (H) 12/02/2018 05:26 AM

## 2018-12-03 NOTE — PROGRESS NOTES
In-Basket message sent to [place] [employer]. BLADE Tello, [facility] Care Management [phone]

## 2018-12-03 NOTE — PROGRESS NOTES
Pt is currently receiving an EEG. Will defer PT and continue to follow.  
 
  
  
Zahra Mohr, PT

## 2018-12-03 NOTE — CDMP QUERY
Please clarify if this patient is (was) being treated/managed for:  
 
=> Rhabdomyolysis POA in setting of Fall in [address] treated with IV fluid 
=> Elevated CK POA in setting of Fall in [address] treated with IV fluid 
=> Other explanation of clinical findings 
=> Clinically Undetermined (no explanation for clinical findings) The medical record reflects the following clinical findings, treatment, and risk factors. Risk Factors: patient was found on the ground Clinical Indicators:  CK: 998 Treatment: IVF NS infusion@ 100ml/hr Please clarify and document your clinical opinion in the progress notes and discharge summary including the definitive and/or presumptive diagnosis, (suspected or probable), related to the above clinical findings. Please include clinical findings supporting your diagnosis. Thank Chastity Claudio [facility] 
[phone]

## 2018-12-04 VITALS
HEIGHT: 72 IN | TEMPERATURE: 98.3 F | OXYGEN SATURATION: 100 % | HEART RATE: 51 BPM | WEIGHT: 179.9 LBS | DIASTOLIC BLOOD PRESSURE: 70 MMHG | RESPIRATION RATE: 16 BRPM | BODY MASS INDEX: 24.37 KG/M2 | SYSTOLIC BLOOD PRESSURE: 136 MMHG

## 2018-12-04 LAB
ANION GAP SERPL CALC-SCNC: 7 MMOL/L (ref 5–15)
BASOPHILS # BLD: 0 K/UL (ref 0–0.1)
BASOPHILS NFR BLD: 0 % (ref 0–1)
BUN SERPL-MCNC: 25 MG/DL (ref 6–20)
BUN/CREAT SERPL: 16 (ref 12–20)
CALCIUM SERPL-MCNC: 7.9 MG/DL (ref 8.5–10.1)
CHLORIDE SERPL-SCNC: 110 MMOL/L (ref 97–108)
CK MB CFR SERPL CALC: 1.6 % (ref 0–2.5)
CK MB SERPL-MCNC: 2.2 NG/ML (ref 5–25)
CK SERPL-CCNC: 135 U/L (ref 39–308)
CO2 SERPL-SCNC: 22 MMOL/L (ref 21–32)
CREAT SERPL-MCNC: 1.55 MG/DL (ref 0.7–1.3)
DIFFERENTIAL METHOD BLD: ABNORMAL
EOSINOPHIL # BLD: 0.2 K/UL (ref 0–0.4)
EOSINOPHIL NFR BLD: 3 % (ref 0–7)
ERYTHROCYTE [DISTWIDTH] IN BLOOD BY AUTOMATED COUNT: 14 % (ref 11.5–14.5)
GLUCOSE SERPL-MCNC: 89 MG/DL (ref 65–100)
HCT VFR BLD AUTO: 33.8 % (ref 36.6–50.3)
HGB BLD-MCNC: 11.1 G/DL (ref 12.1–17)
IMM GRANULOCYTES # BLD: 0 K/UL (ref 0–0.04)
IMM GRANULOCYTES NFR BLD AUTO: 1 % (ref 0–0.5)
LYMPHOCYTES # BLD: 1 K/UL (ref 0.8–3.5)
LYMPHOCYTES NFR BLD: 17 % (ref 12–49)
MAGNESIUM SERPL-MCNC: 1.9 MG/DL (ref 1.6–2.4)
MCH RBC QN AUTO: 34 PG (ref 26–34)
MCHC RBC AUTO-ENTMCNC: 32.8 G/DL (ref 30–36.5)
MCV RBC AUTO: 103.7 FL (ref 80–99)
MONOCYTES # BLD: 0.7 K/UL (ref 0–1)
MONOCYTES NFR BLD: 12 % (ref 5–13)
NEUTS SEG # BLD: 3.9 K/UL (ref 1.8–8)
NEUTS SEG NFR BLD: 67 % (ref 32–75)
NRBC # BLD: 0 K/UL (ref 0–0.01)
NRBC BLD-RTO: 0 PER 100 WBC
PLATELET # BLD AUTO: 172 K/UL (ref 150–400)
PMV BLD AUTO: 9.8 FL (ref 8.9–12.9)
POTASSIUM SERPL-SCNC: 3.7 MMOL/L (ref 3.5–5.1)
RBC # BLD AUTO: 3.26 M/UL (ref 4.1–5.7)
SODIUM SERPL-SCNC: 139 MMOL/L (ref 136–145)
WBC # BLD AUTO: 5.9 K/UL (ref 4.1–11.1)

## 2018-12-04 PROCEDURE — 74011250636 HC RX REV CODE- 250/636: Performed by: INTERNAL MEDICINE

## 2018-12-04 PROCEDURE — 74011000258 HC RX REV CODE- 258: Performed by: INTERNAL MEDICINE

## 2018-12-04 PROCEDURE — 83735 ASSAY OF MAGNESIUM: CPT

## 2018-12-04 PROCEDURE — 82550 ASSAY OF CK (CPK): CPT

## 2018-12-04 PROCEDURE — 36415 COLL VENOUS BLD VENIPUNCTURE: CPT

## 2018-12-04 PROCEDURE — 85025 COMPLETE CBC W/AUTO DIFF WBC: CPT

## 2018-12-04 PROCEDURE — 80048 BASIC METABOLIC PNL TOTAL CA: CPT

## 2018-12-04 PROCEDURE — 74011250637 HC RX REV CODE- 250/637: Performed by: INTERNAL MEDICINE

## 2018-12-04 PROCEDURE — 74011250637 HC RX REV CODE- 250/637: Performed by: FAMILY MEDICINE

## 2018-12-04 PROCEDURE — 74011250637 HC RX REV CODE- 250/637: Performed by: PSYCHIATRY & NEUROLOGY

## 2018-12-04 RX ORDER — NITROFURANTOIN 25; 75 MG/1; MG/1
100 CAPSULE ORAL 2 TIMES DAILY
Qty: 12 CAP | Refills: 0 | Status: SHIPPED | OUTPATIENT
Start: 2018-12-04 | End: 2018-12-27 | Stop reason: ALTCHOICE

## 2018-12-04 RX ORDER — DONEPEZIL HYDROCHLORIDE 5 MG/1
5 TABLET, FILM COATED ORAL
Qty: 30 TAB | Refills: 3 | Status: SHIPPED | OUTPATIENT
Start: 2018-12-04 | End: 2019-02-02

## 2018-12-04 RX ADMIN — LEVOTHYROXINE SODIUM 25 MCG: 25 TABLET ORAL at 05:56

## 2018-12-04 RX ADMIN — TAMSULOSIN HYDROCHLORIDE 0.4 MG: 0.4 CAPSULE ORAL at 08:20

## 2018-12-04 RX ADMIN — MEMANTINE 10 MG: 10 TABLET ORAL at 08:20

## 2018-12-04 RX ADMIN — CEFTRIAXONE 1 G: 1 INJECTION, POWDER, FOR SOLUTION INTRAMUSCULAR; INTRAVENOUS at 10:38

## 2018-12-04 RX ADMIN — Medication 10 ML: at 05:56

## 2018-12-04 RX ADMIN — HEPARIN SODIUM 5000 UNITS: 5000 INJECTION INTRAVENOUS; SUBCUTANEOUS at 08:19

## 2018-12-04 RX ADMIN — Medication 10 ML: at 13:48

## 2018-12-04 RX ADMIN — HEPARIN SODIUM 5000 UNITS: 5000 INJECTION INTRAVENOUS; SUBCUTANEOUS at 00:50

## 2018-12-04 RX ADMIN — ASPIRIN 81 MG 81 MG: 81 TABLET ORAL at 08:20

## 2018-12-04 RX ADMIN — AMLODIPINE BESYLATE 5 MG: 5 TABLET ORAL at 08:20

## 2018-12-04 NOTE — PROGRESS NOTES
Problem: Falls - Risk of 
Goal: *Absence of Falls Document Marymichael Guerrero Fall Risk and appropriate interventions in the flowsheet. Outcome: Progressing Towards Goal 
Fall Risk Interventions: 
Mobility Interventions: Bed/chair exit alarm, Communicate number of staff needed for ambulation/transfer, Patient to call before getting OOB, Strengthening exercises (ROM-active/passive), Utilize walker, cane, or other assistive device Mentation Interventions: Adequate sleep, hydration, pain control, Bed/chair exit alarm, Door open when patient unattended, Increase mobility, More frequent rounding, Reorient patient Medication Interventions: Bed/chair exit alarm, Patient to call before getting OOB, Teach patient to arise slowly Elimination Interventions: Bed/chair exit alarm, Call light in reach, Patient to call for help with toileting needs, Toileting schedule/hourly rounds History of Falls Interventions: Bed/chair exit alarm, Door open when patient unattended Problem: Pressure Injury - Risk of 
Goal: *Prevention of pressure injury Document Manny Scale and appropriate interventions in the flowsheet. Outcome: Progressing Towards Goal 
Pressure Injury Interventions: 
Sensory Interventions: Assess changes in LOC, Assess need for specialty bed, Turn and reposition approx. every two hours (pillows and wedges if needed) Moisture Interventions: Absorbent underpads, Apply protective barrier, creams and emollients, Check for incontinence Q2 hours and as needed, Internal/External urinary devices Activity Interventions: Assess need for specialty bed, Chair cushion, Increase time out of bed Mobility Interventions: Assess need for specialty bed, Chair cushion, Turn and reposition approx. every two hours(pillow and wedges) Nutrition Interventions: Document food/fluid/supplement intake, Offer support with meals,snacks and hydration Friction and Shear Interventions: Lift sheet

## 2018-12-04 NOTE — PROGRESS NOTES
[facility] to [facility] Sonja Domingo Friday 
                                                                      80 y.o.   male [address] [facility]   Room: [address]  Unit Phone# :  [phone] [facility] [address] [address] Dept: [phone] Loc: [id number] SITUATION Admitted:  11/30/2018         Attending Provider:  Allie Cuevas,Harjit Consultations:  IP CONSULT TO NEUROLOGY 
 
PCP:  Kandace Jerez MD   [phone] Treatment Team: Attending Provider: Allie Cuevas DO; Consulting Provider: Ruth Beckwith MD; Utilization Review: Abbi Mendoza; Care Manager: Kathlyne Dance Admitting Dx:  Change in mental status [R41.82] Principal Problem: <principal problem not specified> * No surgery found * of  
  
BY: * Surgery not found *             ON: * No surgery found * Code Status: Full Code Advance Directives:  
Advance Care Planning 12/1/2018 Patient's Healthcare Decision Maker is: (No Data) Confirm Advance Directive (No Data) Does the patient have other document types (No Data) (Send w/patient) Yes Not W Pt  
 
 
Isolation:  There are currently no Active Isolations       MDRO: No current active infections Pain Medications given:  n/a Special Equipment needed: no  Type of equipment: 
 
(Not currently on dialysis) (Not currently on dialysis) (Not currently on dialysis) BACKGROUND Allergies: 
No Known Allergies Past Medical History:  
Diagnosis Date  Abdominal aortic aneurysm ([facility] [address]  Arthritis  CAD (coronary artery disease) bypass  Hypertension  Other ill-defined conditions(799.89)   
 gout  Other ill-defined conditions(799.89)   
 high cholesterol Past Surgical History:  
Procedure Laterality Date  ABDOMEN SURGERY PROC UNLISTED    
 hernia  CARDIAC SURG PROCEDURE UNLIST    
 bypass surgery  HX CATARACT REMOVAL    
 HX TONSILLECTOMY Medications Prior to Admission Medication Sig  
 metoprolol succinate (TOPROL-XL) 50 mg XL tablet TAKE 1 TABLET BY MOUTH DAILY.  tamsulosin (FLOMAX) 0.4 mg capsule TAKE 1 CAPSULE BY MOUTH EVERY DAY  amLODIPine (NORVASC) 5 mg tablet Take 1 Tab by mouth daily.  allopurinol (ZYLOPRIM) 100 mg tablet Take 1 Tab by mouth daily.  LASIX 20 mg tablet Take 1 Tab by mouth every other day. Indications: Edema  memantine (NAMENDA) 10 mg tablet Take 1 Tab by mouth two (2) times a day. 10mg Twice daily  QUEtiapine (SEROQUEL) 25 mg tablet Take 0.5 Tabs by mouth nightly.  furosemide (LASIX) 20 mg tablet TAKE 1 TABLET BY MOUTH EVERY OTHER DAY  QUEtiapine (SEROQUEL) 25 mg tablet TAKE 1/2 TABLET ORALLY NIGHTLY Hard scripts included in transfer packet no Vaccinations:   
Immunization History Administered Date(s) Administered  Influenza Vaccine (Quad) PF 11/23/2017  Pneumococcal Conjugate (PCV-13) 03/15/2018  Pneumococcal Polysaccharide (PPSV-23) 11/08/2014 Readmission Risks:    Known Risks: falls, change in mental status, infection The Charlson CoMorbitiy Index tool is an evidenced based tool that has more automatic generated information. The tool looks at many different items such as the age of the patient, how many times they were admitted in the last calendar year, current length of stay in the hospital and their diagnosis. All of these items are pulled automatically from information documented in the chart from various places and will generate a score that predicts whether a patient is at low (less than 13), medium (13-20) or high (21 or greater) risk of being readmitted. ASSESSMENT Temp: 98.3 °F (36.8 °C) (12/04/18 1229) Pulse (Heart Rate): (!) 51 (12/04/18 1229) Resp Rate: 16 (12/04/18 1229)           BP: 136/70 (12/04/18 1229) O2 Sat (%): 100 % (12/04/18 1229) Weight: 81.6 kg (179 lb 14.3 oz)    Height: 6' (182.9 cm) (11/30/18 1549) If above not within 1 hour of discharge: 
 
BP:_____  P:____  R:____ T:_____ O2 Sat: ___%  O2: ______ Active Orders Diet DIET CARDIAC Regular; 2 GM NA (House Low NA) Orientation: oriented to time, place, person and situation and only aware of  place and person Active Behaviors: None Active Lines/Drains:  (Peg Tube / Flowers / CL or S/L?): no 
 
Urinary Status: Voiding     Last BM: Last Bowel Movement Date: 12/01/18 Skin Integrity: Intact, Tattoos (comment) Mobility: Slightly limited Weight Bearing Status: WBAT (Weight Bearing as Tolerated) Gait Training Assistive Device: Gait belt(HHA x 1) Ambulation - Level of Assistance: Minimal assistance, Assist x1, Additional time Distance (ft): 95 Feet (ft) Lab Results Component Value Date/Time Glucose 89 12/04/2018 05:53 AM  
 Hemoglobin A1c 5.4 12/02/2018 05:26 AM  
 INR 1.1 05/13/2016 03:23 PM  
 INR 1.2 (H) 11/08/2014 05:15 AM  
 HGB 11.1 (L) 12/04/2018 05:53 AM  
 HGB 10.8 (L) 12/02/2018 05:26 AM  
  
  RECOMMENDATION See After Visit Summary (AVS) for: · Discharge instructions · [facility] · Special equipment needed (entered pre-discharge by Care Management) · Medication Reconciliation · Follow up Appointment(s) Report given/sent by:  Radha Proctor Verbal report given to: Leopoldo Barrett Dr. Fredia Covert has not entered DC summary yet but writer will fax to [phone] Estimated discharge time:  12/4/2018 at 1476

## 2018-12-04 NOTE — PROGRESS NOTES
Verification of Inpatient Status order x 3 Midnights occurred on 12/4/18 with patient eligible for placement at nursing facility on 12/3/18. Larance Essex, Yosef Rodríguez

## 2018-12-04 NOTE — PROGRESS NOTES
CM spoke with daughter-in-law, Valerie Pineda ([phone]), to discuss discharge plan. Daughter-in-law would like patient to go to [facility] for SNF since he has been there before, approx. one year ago. CM sent referral to [facility] via [facility]. FOC verbally signed by daughter-in-law. BLADE Paige, [facility] Care Management [phone] 
 
 
9:49 AM 
Pt accepted to [facility]. 10:02 AM 
[facility] transport referral sent via [employer].

## 2018-12-04 NOTE — PROGRESS NOTES
0471 This writer attempted to fax DC summary to [facility] as it was not completed by MD @ time of discharge - stated fax sent successfully

## 2018-12-04 NOTE — DISCHARGE SUMMARY
Hospitalist Discharge Summary     Patient ID:  Mendoza Mast  [id number]  80 y.o.  3/15/1933    PCP on record: Manuel Bartlett MD    Admit date: 11/30/2018  Discharge date and time: 12/4/2018      DISCHARGE DIAGNOSIS:  Acute metabolic encephalopathy (POA)  Possible UTI  Acute on chronic renal failure 3  Dehydration due to diarrhea  Diarrhea, likely viral Gastroenteritis, resolved. 3 days with diarrhea at [facility]. Elevated CK POA in setting of Fall in [address] treated with IV fluid  HTN  BPH  Dementia  Gout      CONSULTATIONS:  IP CONSULT TO NEUROLOGY    Excerpted HPI from H&P of Niurka Hernandez MD:  80years old male with past medical history significant for coronary artery disease, HTN, gout, arthritis, chronic kidney disease presented to the ED complaining from worsening mental status, according to the EMS report patient was found on the ground patient lives in assisted living facility apartment and according to the report patient had diarrhea for past 3 days, and patient also noticed has difficulty walking past 3 days, workup in ED head CT scan was negative for any abnormality chest x-ray showed no acute abnormality CPK was noticed to be elevated 998, patient will be admitted for further evaluation of change mental status. ______________________________________________________________________  DISCHARGE SUMMARY/HOSPITAL COURSE:  for full details see H&P, daily progress notes, labs, consult notes. Acute metabolic encephalopathy (POA)  Neurology is following. EEG ordered for completeness  In setting of some baseline dementia  Encephalopathy resolved  CT head negative. MRI with no acute findings. Echo with EF of 55-60%. UA > 100 wbc, +nitrites, large LE. Ucx + skin eliseo  Single dose of ceftriaxone in hospital. +urinary frequency in setting of BPH. Complete course of macrobid     Acute on chronic renal failure 3  Dehydration due to diarrhea  Diarrhea, likely viral Gastroenteritis, resolved.  3 days with diarrhea at [facility]. Prerenal from dehydration. Baseline creatinine 1.4 - 1.6. Improved with IV fluids. Close monitor     Elevated CK POA in setting of Fall in [address] treated with IV fluid  Monitor cpk     HTN  On home amlodipine. holding BB and lasix. BPH  Stable. on Flomax 0.4 mg daily     Dementia  on Namenda 10 mg daily, donepezil added     Gout  Resume allopurinol.     _______________________________________________________________________  Patient seen and examined by me on discharge day. Pertinent Findings:  Gen:    Not in distress  Chest: Clear lungs  CVS:   Regular rhythm. No edema  Abd:  Soft, not distended, not tender  Neuro:  Alert, oriented x 3  _______________________________________________________________________    My Recommended Diet, Activity, Wound Care, and follow-up labs are listed in the patient's Discharge Insturctions which I have personally completed and reviewed. My Medications      START taking these medications      Instructions Each Dose to Equal Morning Noon Evening Bedtime   donepezil 5 mg tablet  Commonly known as:  ARICEPT  Your next dose is:  TONIGHT 12/4/18      Take 1 Tab by mouth nightly. 5 mg         nitrofurantoin (macrocrystal-monohydrate) 100 mg capsule  Commonly known as:  MACROBID      Take 1 Cap by mouth two (2) times a day. 100 mg            CHANGE how you take these medications      Instructions Each Dose to Equal Morning Noon Evening Bedtime   furosemide 20 mg tablet  Commonly known as:  LASIX  What changed:  Another medication with the same name was removed. Continue taking this medication, and follow the directions you see here. Your next dose is:  TOMORROW 12/5/18      TAKE 1 TABLET BY MOUTH EVERY OTHER DAY          QUEtiapine 25 mg tablet  Commonly known as:  SEROquel  What changed:  Another medication with the same name was removed. Continue taking this medication, and follow the directions you see here.   Your next dose is:  TONIGHT 12/4/18      Take 0.5 Tabs by mouth nightly. 12.5 mg            CONTINUE taking these medications      Instructions Each Dose to Equal Morning Noon Evening Bedtime   allopurinol 100 mg tablet  Commonly known as:  ZYLOPRIM  Your next dose is:  TOMORROW 12/5/18      Take 1 Tab by mouth daily. 100 mg         amLODIPine 5 mg tablet  Commonly known as:  NORVASC  Your next dose is:  TOMORROW 12/5/18      Take 1 Tab by mouth daily. 5 mg         memantine 10 mg tablet  Commonly known as:  NAMENDA  Your next dose is:  TODAY 12/4/18 @ 1800      Take 1 Tab by mouth two (2) times a day. 10mg Twice daily   10 mg         metoprolol succinate 50 mg XL tablet  Commonly known as:  TOPROL-XL  Your next dose is:  TOMORROW 12/5/18      TAKE 1 TABLET BY MOUTH DAILY. tamsulosin 0.4 mg capsule  Commonly known as:  FLOMAX  Your next dose is:  TOMORROW 12/5/18      TAKE 1 CAPSULE BY MOUTH EVERY DAY                Where to Get Your Medications      These medications were sent to [address], [address] [place] [address],[address]    Phone:  [phone]   · donepezil 5 mg tablet     Information on where to get these meds will be given to you by the nurse or doctor.     Ask your nurse or doctor about these medications  · nitrofurantoin (macrocrystal-monohydrate) 100 mg capsule         Risk of deterioration: Moderate    Condition at Discharge:  Stable  ______________________________________________________________________    Disposition  SNF/LTC  ______________________________________________________________________    Care Plan discussed with:   Patient, Family, RN, Care Manager, Consultant    ______________________________________________________________________    Code Status: Full Code  ______________________________________________________________________      Follow up with:   PCP : Sameer Pill, MD  Follow-up Information     Follow up With Specialties Details Why Contact Info    Frankie Santizo MD Internal Medicine Go on 1/11/2019 Please follow up on January 11, 2019 at 8:30 [address] [place] [address]  [phone]                Total time in minutes spent coordinating this discharge (includes going over instructions, follow-up, prescriptions, and preparing report for sign off to her PCP) :  30 minutes    Signed:  Sulaiman Shipman DO

## 2018-12-04 NOTE — PROGRESS NOTES
Hospitalist Progress NoteNAME: Magaly Kelly :  3/15/1933 MRN:  [id number] Assessment / Plan: 
Acute metabolic encephalopathy (POA) Neurology is following. EEG ordered. CT head negative. MRI with no acute findings. Echo with EF of 55-60%. Ucx mixed eliseo. Acute on chronic renal failure 3 Dehydration due to diarrhea Diarrhea, likely viral Gastroenteritis, resolved. 3 days with diarrhea at [facility]. Prerenal from dehydration. Baseline creatinine 1.4 - 1.6. Improved with IV fluids. Close monitor Elevated CK POA in setting of Fall in [address] treated with IV fluid Monitor cpk HTN On home amlodipine. holding BB and lasix. BPH Stable. on Flomax 0.4 mg daily Dementia 
on Namenda 10 mg daily Gout Resume allopurinol. Code Status: full code Surrogate Decision Maker: 
  
DVT Prophylaxis: heparin GI Prophylaxis: not indicated 
  
Baseline: live in assisting living Subjective: Chief Complaint / Reason for Physician Visit Alert. He is eating his lunch well. Discussed with RN events overnight. Review of Systems: 
Symptom Y/N Comments  Symptom Y/N Comments Fever/Chills n   Chest Pain n   
Poor Appetite n   Edema n   
Cough n   Abdominal Pain n   
Sputum n   Joint Pain SOB/URENA n   Pruritis/Rash Nausea/vomit n   Tolerating PT/OT Diarrhea n   Tolerating Diet y Constipation    Other Objective: VITALS:  
Last 24hrs VS reviewed since prior progress note. Most recent are: 
Patient Vitals for the past 24 hrs: 
 Temp Pulse Resp BP SpO2  
18 1533 97.7 °F (36.5 °C) 63 18 126/72 95 % 18 1159 97.6 °F (36.4 °C) 73 18 149/81 95 % 18 0738 97.4 °F (36.3 °C) (!) 52 18 150/79 98 % 18 1935 98.2 °F (36.8 °C) 70 18 139/72 96 % Intake/Output Summary (Last 24 hours) at 12/3/2018 1904 Last data filed at 2018 2228 Gross per 24 hour Intake  Output 350 ml Net -350 ml  
 PHYSICAL EXAM: 
General/NEURO: Alert, Oriented to person and place. He answers 2000 to year. He knows the name of the president. Cooperative, no acute distress. Moves all extremities and sits up. EENT:  EOMI. Anicteric sclerae. Resp:  CTA bilaterally, no wheezing or rales. No accessory muscle use CV:  Regular  rate,  No LE edema GI:  Soft, Non distended, Non tender.  +Bowel sounds Psych:   Not anxious nor agitated Reviewed most current lab test results and cultures  YES Reviewed most current radiology test results   YES Review and summation of old records today    NO Reviewed patient's current orders and MAR    YES 
PMH/ reviewed - no change compared to H&P 
________________________________________________________________________ 
________________________________________________________________________ Leann Nguyen MD  
 
Procedures: see electronic medical records for all procedures/Xrays and details which were not copied into this note but were reviewed prior to creation of Plan. LABS: 
I reviewed today's most current labs and imaging studies. Pertinent labs include: 
Recent Labs 12/02/18 
6011 12/01/18 
6948 WBC 5.9 7.1 HGB 10.8* 11.1*  
HCT 32.9* 33.3*  
 148* Recent Labs 12/02/18 
6163 12/01/18 
3446  142  
K 3.6 3.7 * 110* CO2 21 23 * 72 BUN 38* 38* CREA 1.98* 2.09* CA 8.1* 8.1* ALB 3.0* 3.2* TBILI 0.6 1.0  
SGOT 41* 45* ALT 19 19

## 2018-12-04 NOTE — PROGRESS NOTES
0715 bedside shift report received from Doctor Felipa Ba. Writer assumed care of patient. 2047 transport @ bedside to take patient to [address] [facility],[address].  Charge RN aware of no DC summary entered by MD. Charge RN paged MD

## 2018-12-04 NOTE — PROGRESS NOTES
Reason for Admission:   Change in mental status - fall RRAT Score:  11 Plan for utilizing home health:  No plan at this time. Has used HH in the past.     
                 
Likelihood of Readmission:  Moderate Transition of Care Plan:  CM introduced role and attempted to engage pt in discussion. Patient was alert although he appeared confused and was unable to verify his information. CM called daughter-in-law, Garland Sears, to verify demographics and discuss transition of care plan. Per Herbert Frederick, patient was treated at [facility] approximately one year ago. She would like patient to return to [facility] since he is familiar. Will consider transfer to LTC or [facility] after discharge from SNF. Pt was accepted to [facility] and will be transported via [facility] upon discharge. Plan discussed with patient who is agreeable. Care Management Interventions PCP Verified by CM: Yes(Adan Bob  [phone]) Mode of Transport at Discharge: BLS([facility]) Transition of Care Consult (CM Consult): SNF([facility]) Partner SNF: Yes MyChart Signup: No 
Discharge Durable Medical Equipment: No(has RW and tub transfer) Physical Therapy Consult: Yes Occupational Therapy Consult: Yes Speech Therapy Consult: Yes Current Support Network: Lives Alone Confirm Follow Up Transport: Friends(fernanda - Susi Donnelly [phone]) Plan discussed with Pt/Family/Caregiver: Yes(confirmed with daughter-in-law) Freedom of Choice Offered: Yes(verbal from daughter-in-law) Discharge Location Discharge Placement: Skilled nursing facility([facility]) BLADE Syed, QMHP-A Care Management [phone]

## 2018-12-04 NOTE — PROGRESS NOTES
PCS, Facesheet, and H&P placed on chart for transport. Transport arranged for 2:30pm via [facility] stretcher for advanced dementia w/ confusion and generalized weakness. Referral was sent via [facility]. Nursing will need to call report to [address] [place] [address],[address] at [phone]. Pleast send AVS, MAR, and any written prescriptions to facility in SNF envelope. BLADE Rivera, HP-A Care Management [phone]

## 2018-12-04 NOTE — CDMP QUERY
Please clarify if this patient is (was) being treated/managed for:  
 
=> Urinary tract infection in the setting of worsening mental status POA in 85M treated with IV Rocephin  
=> Acute Cystitis without hematuria in the setting of worsening mental status POA in 85M treated with IV Rocephin 
=> Other explanation of clinical findings 
=> Clinically Undetermined (no explanation for clinical findings) The medical record reflects the following clinical findings, treatment, and risk factors. Risk Factors: AMS Clinical Indicators:  UA: le: large; wbc: >100; bact: 3+; UCx: mixed skin eliseo Treatment: Rocephin 1g q 24 hours x 3 doses Please clarify and document your clinical opinion in the progress notes and discharge summary including the definitive and/or presumptive diagnosis, (suspected or probable), related to the above clinical findings. Please include clinical findings supporting your diagnosis. Thank Ty Srivastava [facility] 
[phone]

## 2018-12-04 NOTE — ROUTINE PROCESS
Bedside and Verbal shift change report given to Genevieve (oncoming nurse) by Sanjay Cedillo RN (offgoing nurse). Report included the following information SBAR, Kardex, ED Summary, MAR, Recent Results and Cardiac Rhythm SR BBB 
  
Zone Phone:  [phone] 
  
  
Significant changes during shift:  none 
  
  
  
Patient Information 
  
Neftali Hartmann 
80 y.o. 
11/30/2018  3:25 PM by Lindsay Bryant MD. Alexis Juan admitted from Assisted Living 
  
Problem List 
  
       
Patient Active Problem List  
  Diagnosis Date Noted  Change in mental status 11/30/2018  Inability to walk 11/22/2017  Coronary artery disease involving native coronary artery without angina pectoris 05/13/2016  S/P CABG (coronary artery bypass graft) 05/13/2016  
 Hiatal hernia with gastroesophageal reflux 05/13/2016  H/O endovascular stent graft for abdominal aortic aneurysm 05/13/2016  CKD (chronic kidney disease) stage 3, GFR 30-59 ml/min ([facility]) 05/13/2016  AAA (abdominal aortic aneurysm) ([facility] [address] 11/07/2014  
  
       
Past Medical History:  
Diagnosis Date  Abdominal aortic aneurysm (HCC)    
 Arthritis    
 CAD (coronary artery disease)    
  bypass  Hypertension    
 Other ill-defined conditions(799.89)    
  gout  Other ill-defined conditions(799.89)    
  high cholesterol  
  
  
  
Core Measures: 
  
CVA: Yes Yes CHF:No No 
PNA:No No 
  
  
Activity Status: 
  
OOB to Chair No 
Ambulated this shift Yes Bed Rest No 
  
  
  
LINES AND DRAINS: 
  
PIV 
  
DVT prophylaxis: 
  
DVT prophylaxis Med- Yes DVT prophylaxis SCD or ALMAS- No  
  
  
Patient Safety: 
  
Falls Score Total Score: 5 Safety Level_______ Bed Alarm On? Yes Sitter? No 
  
Plan for upcoming shift safety 
  
  
  
Discharge Plan:placement CM working on it 
  
Active Consults: 
IP CONSULT TO NEUROLOGY

## 2018-12-05 ENCOUNTER — PATIENT OUTREACH (OUTPATIENT)
Dept: FAMILY MEDICINE CLINIC | Age: 83
End: 2018-12-05

## 2018-12-05 NOTE — PROGRESS NOTES
Transition of Care Coordination/Hospital to Post Acute Facility: 
  
Date/Time:  2018 8:30 AM 
 
Patient was admitted to [facility] on 18 and discharged on  for acute emtobolic encelphalopathy. Patient was transferred to [facility] for continuation of care. Inpatient RRAT score: 11 Top Challenges reviewed   
none Method of communication with care team :none Nurse Navigator(NN) emailed with Azael Bliss  to provide introduction to self and explanation of the Nurse Navigator Role. Verified name and  as patient identifiers. Requested  anticipated length of stay, diet restrictions, discharge summary, follow up appointments, medication reconciliation ACP:  
Does the patient have a current ACP (including DDNR):  no 
Does the post acute facility have a copy of the patients ACP:  N/A Medication(s):  
New Medications at Discharge: Alondra Contreras Changed Medications at Discharge: lasix every other day, Seroquel half tab QHS Discontinued Medications at Discharge: none PCP/Specialist follow up:  
Future Appointments Date Time Provider Alexandr Morris 2019  8:30 AM Julian Bob MD [address] [facility] Opportunity to ask questions was provided. Contact information was provided for future reference or further questions. Will continue to monitor.

## 2018-12-06 ENCOUNTER — PATIENT OUTREACH (OUTPATIENT)
Dept: CASE MANAGEMENT | Age: 83
End: 2018-12-06

## 2018-12-06 NOTE — Clinical Note
PT/OT/SLP evaluated 12/5. SNF to provide therapy updates once received. Your path meeting scheduled with pt and family 12/7. Per chart pt lives alone and will consider transfer to LTC or [place] after discharge from SNF. Per SNF pt has used [facility]. Disposition/ LOS TBD.

## 2018-12-07 NOTE — PROGRESS NOTES
Community Care Team documentation for patient in [facility] Initial Follow Up Patient was admitted to [facility] on 11/30 and discharged 12/4 to [facility]. Hospital Discharge diagnosis:  Acute metabolic encephalopathy RRAT score: 11 Advance Care Planning:  Not on file. PCP : Flor Chen MD 
Nurse Navigator in PCP office: Faustino Irvin Note routed to Nurse Navigator team. 
 
SNF Attending:  Chantal Crawley MD 
 
Spoke with SNF team.  Ensured patient arrived to SNF safely with admission packet in order. PT/OT/SLP evaluated 12/5. SNF to provide therapy updates once received. Your path meeting scheduled with pt and family 12/7. Per chart pt lives alone and [facility]. Per SNF pt has used [facility]. Disposition/ LOS TBD. Community Care Team will follow up weekly with [facility] until discharge. Medications were not reconciled and general patient assessment was not completed during this [facility] outreach.

## 2018-12-13 ENCOUNTER — PATIENT OUTREACH (OUTPATIENT)
Dept: CASE MANAGEMENT | Age: 83
End: 2018-12-13

## 2018-12-13 NOTE — PROGRESS NOTES
Community Care Team Documentation for Patient in [facility]  Subsequent Follow up     Patient remains at [facility] ([facility]). See previous [facility] Team notes. PCP : Lawanda Mejias MD  Nurse Navigator in PCP office: Amanda Quintanilla    Spoke with SNF team.  PT/OT last treat day 12/17. Plan for discharge 12/18 to home alone with [facility]. PCP follow up 12/19 at 11:00 AM.    Medications were not reconciled and general patient assessment was not completed during this skilled nursing facility outreach.      Winifred Eddy, MSN, RN, ACNS-BC, [facility]  Nurse Navigator, [place] [phone]

## 2018-12-20 ENCOUNTER — PATIENT OUTREACH (OUTPATIENT)
Dept: CASE MANAGEMENT | Age: 83
End: 2018-12-20

## 2018-12-20 NOTE — PROGRESS NOTES
Community Care Team Documentation for Patient in [facility]  Discharge Note    Patient has been discharged from [facility] ([facility]). See previous [facility] Team notes. PCP : Flor Chen MD  Nurse Navigator in PCP office: Faustino Irvin  Note routed to Nurse Navigator team.    Spoke with SNF team.  Discharge was postponed to 12/19. Friend picked pt up and will stay with him until Sunday. Patient will then move to [place] with son. Patient declined home health for the few days he will be in [place]. Patient did not attend his PCP follow up for 12/19 at 11:00 AM.    [facility] Team will sign off at this time. Medications were not reconciled and general patient assessment was not completed during this skilled nursing facility outreach.      Venkata Polanco, MSN, RN, ACNS-BC, [facility]  Nurse Navigator, [facility] [phone]

## 2018-12-27 ENCOUNTER — PATIENT OUTREACH (OUTPATIENT)
Dept: FAMILY MEDICINE CLINIC | Age: 83
End: 2018-12-27

## 2018-12-27 NOTE — LETTER
12/27/2018 3:23 PM 
 
Mr. Miguel Clifton 
[address] [place] [address]  [address] [id number] Dear Mr. Azra Marti: At any age, we need to think about our wishes if something catastrophic were to happen medically. If you were not able to speak for yourself, is there a way that your wishes were followed if you cannot meaningfully recover? There is a way you can let your medical team know if you do not want (or want) certain medical procedures in the event that you cannot recover from something back to good quality of life. Please find enclosed the documents and papers regarding making an Advance Care Plan. You just need to pick your 2 \"spokespeople\" and let them know what you want. Then you decide what you want by checking the boxes. Lastly, you sign the last page with two people watching you and they sign the witness blanks. These papers do NOT need to be notarized, are effective from the date signed and can be changed or revoked at any time. When you do complete the paperwork, please bring them to your Primary Care office. A copy will be made for their records and the original returned to you for safekeeping. The papers will then be available for all Alesia Salas. If you have any questions please feel free to call me at [phone]. Monique Flaherty RN

## 2018-12-27 NOTE — PROGRESS NOTES
Hospital Discharge Follow-Up      Date/Time:  2018 3:05 PM    Patient was admitted to [facility] on 2018 and discharged on 2018 for Acute Metabolic Encephalopathy, patient then to [facility] until 2018. The physician discharge summary was available at the time of outreach. Patient was contacted within 6 business days of discharge. (we were informed patient was moving to [facility] with son, which patient changed his mind)      Top Challenges reviewed with the provider   Lives alone and has a \"friend\" come 3 times a day, cooks, does laundry and drives to appt. Patient not moving to [place] with son. Home Health never started due to this planned move. Does he need Home Health? ?  UTI on admit  Does not have an Advanced Directive    TSH-84.00, Calcium-7.9, Bun-25, Creat-1.55. Method of communication with provider :chart routing    Inpatient RRAT score: 6  Was this a readmission? no  Patient stated reason for the readmission: N/A    Nurse Navigator (NN) contacted the patient by telephone to perform post hospital discharge assessment. Verified name and  with patient as identifiers. Provided introduction to self, and explanation of the Nurse Navigator role. Reviewed discharge instructions and red flags with patient who verbalized understanding. Patient given an opportunity to ask questions and does not have any further questions or concerns at this time. The patient agrees to contact the PCP office for questions related to their healthcare. NN provided contact information for future reference. Disease Specific:   N/A    Summary of patient's top problems:  1. Admitted with Acute Metabolic Encephalopathy, went to [facility], thought was he was moving with son to [place]. Patient did not want this, Home Health never started due to this fact. 2. Also had a UTI on admit-finished Macrobid at [facility].     3. Does not have an Advanced Directive-information sent    Home Health orders at discharge: [address]: N/A  Date of initial visit: N/A    Durable Medical Equipment ordered/company: none  Durable Medical Equipment received: None ordered    Barriers to care? lack of knowledge about disease, medication management    Advance Care Planning:   Does patient have an Advance Directive:  not on file; education provided     Medication(s):   New Medications at Discharge: Aricept and Microbid  Changed Medications at Discharge: Lasix and Seroquel  Discontinued Medications at Discharge: N/A    Medication reconciliation was performed with patient, who verbalizes understanding of administration of home medications. There were barriers to obtaining medications identified at this time. Patient does not prepare his medication, his daughter-in-law does. Patient does not know what he is on and could not find his bottle of Seroquel. Referral to Pharm D needed: no     Current Outpatient Medications   Medication Sig    donepezil (ARICEPT) 5 mg tablet Take 1 Tab by mouth nightly.  metoprolol succinate (TOPROL-XL) 50 mg XL tablet TAKE 1 TABLET BY MOUTH DAILY.  tamsulosin (FLOMAX) 0.4 mg capsule TAKE 1 CAPSULE BY MOUTH EVERY DAY    amLODIPine (NORVASC) 5 mg tablet Take 1 Tab by mouth daily.  allopurinol (ZYLOPRIM) 100 mg tablet Take 1 Tab by mouth daily.  memantine (NAMENDA) 10 mg tablet Take 1 Tab by mouth two (2) times a day. 10mg Twice daily    furosemide (LASIX) 20 mg tablet TAKE 1 TABLET BY MOUTH EVERY OTHER DAY    QUEtiapine (SEROQUEL) 25 mg tablet Take 0.5 Tabs by mouth nightly. No current facility-administered medications for this visit.         Medications Discontinued During This Encounter   Medication Reason    nitrofurantoin, macrocrystal-monohydrate, (MACROBID) 100 mg capsule Therapy Completed       [facility] follow up appointment(s):   Future Appointments   Date Time Provider Alexandr Morris   1/3/2019 12:15 PM Dandre Burt MD MEDICAL [facility] SCHED   1/11/2019  8:30 AM Domah, Prentis Ahumada, MD [address]      Non-[facility] follow up appointment(s): N/A  [facility]:  n/a       Goals      Prepare patients and caregivers for end of life decisions (ie. need for hospice, pain management, symptom relief, advance directives etc.)      Patient thinks he has a will, does not have an Advanced Directive, patient is agreeable to me sending him Advanced Directive paperwork. Monitor status of Advanced Directive paperwork on next call. THE [facility]       Supportive resources in place to maintain patient in the community (ie. Home Health, DME equipment, refer to, medication assistant plan, etc.)      Patient is not moving with son to [place], patient lives alone in his apartment and has a friend who comes over 3 times a day and cooks and does laundry and drives to appts. Patient does not have Home Health, his daughter-in-law prepares his medications for him. F/U appt made for 1/3 at 12:15 to see Dr Shannan Mendenhall. Monitor that patient continues to have friend support and patient attends his appt with Dr Shannan Mendenhall on 1/3 as scheduled.   THE [facility]

## 2019-01-09 ENCOUNTER — PATIENT OUTREACH (OUTPATIENT)
Dept: FAMILY MEDICINE CLINIC | Age: 84
End: 2019-01-09

## 2019-01-27 ENCOUNTER — HOSPITAL ENCOUNTER (EMERGENCY)
Age: 84
Discharge: HOME | DRG: 308 | End: 2019-01-27
Attending: EMERGENCY MEDICINE
Payer: MEDICARE

## 2019-01-27 ENCOUNTER — APPOINTMENT (OUTPATIENT)
Dept: CT IMAGING | Age: 84
DRG: 308 | End: 2019-01-27
Attending: EMERGENCY MEDICINE
Payer: MEDICARE

## 2019-01-27 VITALS
SYSTOLIC BLOOD PRESSURE: 134 MMHG | DIASTOLIC BLOOD PRESSURE: 71 MMHG | HEIGHT: 73 IN | HEART RATE: 49 BPM | WEIGHT: 193.56 LBS | TEMPERATURE: 98.2 F | BODY MASS INDEX: 25.65 KG/M2 | OXYGEN SATURATION: 99 % | RESPIRATION RATE: 14 BRPM

## 2019-01-27 DIAGNOSIS — F03.91 DEMENTIA WITH BEHAVIORAL DISTURBANCE, UNSPECIFIED DEMENTIA TYPE: Primary | ICD-10-CM

## 2019-01-27 LAB
ALBUMIN SERPL-MCNC: 3.7 G/DL (ref 3.5–5)
ALBUMIN/GLOB SERPL: 1.1 {RATIO} (ref 1.1–2.2)
ALP SERPL-CCNC: 80 U/L (ref 45–117)
ALT SERPL-CCNC: 38 U/L (ref 12–78)
ANION GAP SERPL CALC-SCNC: 7 MMOL/L (ref 5–15)
APPEARANCE UR: CLEAR
AST SERPL-CCNC: 30 U/L (ref 15–37)
BACTERIA URNS QL MICRO: NEGATIVE /HPF
BASOPHILS # BLD: 0 K/UL (ref 0–0.1)
BASOPHILS NFR BLD: 0 % (ref 0–1)
BILIRUB SERPL-MCNC: 0.5 MG/DL (ref 0.2–1)
BILIRUB UR QL: NEGATIVE
BUN SERPL-MCNC: 32 MG/DL (ref 6–20)
BUN/CREAT SERPL: 15 (ref 12–20)
CALCIUM SERPL-MCNC: 8.7 MG/DL (ref 8.5–10.1)
CHLORIDE SERPL-SCNC: 107 MMOL/L (ref 97–108)
CO2 SERPL-SCNC: 26 MMOL/L (ref 21–32)
COLOR UR: NORMAL
CREAT SERPL-MCNC: 2.17 MG/DL (ref 0.7–1.3)
DIFFERENTIAL METHOD BLD: ABNORMAL
EOSINOPHIL # BLD: 0.1 K/UL (ref 0–0.4)
EOSINOPHIL NFR BLD: 1 % (ref 0–7)
EPITH CASTS URNS QL MICRO: NORMAL /LPF
ERYTHROCYTE [DISTWIDTH] IN BLOOD BY AUTOMATED COUNT: 13.9 % (ref 11.5–14.5)
GLOBULIN SER CALC-MCNC: 3.4 G/DL (ref 2–4)
GLUCOSE SERPL-MCNC: 98 MG/DL (ref 65–100)
GLUCOSE UR STRIP.AUTO-MCNC: NEGATIVE MG/DL
HCT VFR BLD AUTO: 34.7 % (ref 36.6–50.3)
HGB BLD-MCNC: 11.6 G/DL (ref 12.1–17)
HGB UR QL STRIP: NEGATIVE
HYALINE CASTS URNS QL MICRO: NORMAL /LPF (ref 0–5)
IMM GRANULOCYTES # BLD AUTO: 0 K/UL (ref 0–0.04)
IMM GRANULOCYTES NFR BLD AUTO: 0 % (ref 0–0.5)
KETONES UR QL STRIP.AUTO: NEGATIVE MG/DL
LEUKOCYTE ESTERASE UR QL STRIP.AUTO: NEGATIVE
LYMPHOCYTES # BLD: 1 K/UL (ref 0.8–3.5)
LYMPHOCYTES NFR BLD: 14 % (ref 12–49)
MCH RBC QN AUTO: 34.9 PG (ref 26–34)
MCHC RBC AUTO-ENTMCNC: 33.4 G/DL (ref 30–36.5)
MCV RBC AUTO: 104.5 FL (ref 80–99)
MONOCYTES # BLD: 0.7 K/UL (ref 0–1)
MONOCYTES NFR BLD: 10 % (ref 5–13)
NEUTS SEG # BLD: 5.3 K/UL (ref 1.8–8)
NEUTS SEG NFR BLD: 75 % (ref 32–75)
NITRITE UR QL STRIP.AUTO: NEGATIVE
NRBC # BLD: 0 K/UL (ref 0–0.01)
NRBC BLD-RTO: 0 PER 100 WBC
PH UR STRIP: 6.5 [PH] (ref 5–8)
PLATELET # BLD AUTO: 159 K/UL (ref 150–400)
PMV BLD AUTO: 10.2 FL (ref 8.9–12.9)
POTASSIUM SERPL-SCNC: 4.8 MMOL/L (ref 3.5–5.1)
PROT SERPL-MCNC: 7.1 G/DL (ref 6.4–8.2)
PROT UR STRIP-MCNC: NEGATIVE MG/DL
RBC # BLD AUTO: 3.32 M/UL (ref 4.1–5.7)
RBC #/AREA URNS HPF: NORMAL /HPF (ref 0–5)
SODIUM SERPL-SCNC: 140 MMOL/L (ref 136–145)
SP GR UR REFRACTOMETRY: 1.02 (ref 1–1.03)
UA: UC IF INDICATED,UAUC: NORMAL
UROBILINOGEN UR QL STRIP.AUTO: 0.2 EU/DL (ref 0.2–1)
WBC # BLD AUTO: 7.1 K/UL (ref 4.1–11.1)
WBC URNS QL MICRO: NORMAL /HPF (ref 0–4)

## 2019-01-27 PROCEDURE — 93005 ELECTROCARDIOGRAM TRACING: CPT

## 2019-01-27 PROCEDURE — 80053 COMPREHEN METABOLIC PANEL: CPT

## 2019-01-27 PROCEDURE — 36415 COLL VENOUS BLD VENIPUNCTURE: CPT

## 2019-01-27 PROCEDURE — 99285 EMERGENCY DEPT VISIT HI MDM: CPT

## 2019-01-27 PROCEDURE — 81001 URINALYSIS AUTO W/SCOPE: CPT

## 2019-01-27 PROCEDURE — 70450 CT HEAD/BRAIN W/O DYE: CPT

## 2019-01-27 PROCEDURE — 85025 COMPLETE CBC W/AUTO DIFF WBC: CPT

## 2019-01-27 RX ORDER — SODIUM CHLORIDE 0.9 % (FLUSH) 0.9 %
5-40 SYRINGE (ML) INJECTION EVERY 8 HOURS
Status: DISCONTINUED | OUTPATIENT
Start: 2019-01-27 | End: 2019-01-28 | Stop reason: HOSPADM

## 2019-01-27 RX ORDER — SODIUM CHLORIDE 0.9 % (FLUSH) 0.9 %
5-40 SYRINGE (ML) INJECTION AS NEEDED
Status: DISCONTINUED | OUTPATIENT
Start: 2019-01-27 | End: 2019-01-28 | Stop reason: HOSPADM

## 2019-01-27 NOTE — ED PROVIDER NOTES
EMERGENCY DEPARTMENT HISTORY AND PHYSICAL EXAM 
 
 
Date: 1/27/2019 Patient Name: Xiomara Cyr History of Presenting Illness Chief Complaint Patient presents with  Altered mental status  
  walked into another apartment that wasn't his an hour ago; disorientation unknown last known well as known to [place] like this before\" History Provided By: Patient and EMS 
 
HPI: Xiomara Cry, 80 y.o. male with PMHx significant for AAA, CAD, HTN, gout, presents via EMS to the ED with cc of AMS 1 hr PTA. Per EMS, pt lives 1 floor above his friend's apartment. It is not unusual for pt to walk into friend's apartment without prior notice, however, when pt walked into the apartment about 1hr PTA, he thought it was his apartment. Friend called EMS, and upon their arrival, pt was disoriented and was talking about \"bowling shoes and space goblins. \" Pt then went outside and tried to get into a car that was not his. Friend denies last known well. En route, pt's Bg was 115. EMS reports that pt was able to correctly state his name, current events, and the date. But then would intermittently talk incoherently. EMS denies slurred speech,  deficits, or abnl gait. Pt denies any abd pain, CP, SOB, decreased appetite, n/v/d, or urinary frequency. Pt states that he is unsure why he is in ED. There are no other complaints, changes, or physical findings at this time. PCP: Umberto Cohen MD 
 
Social Hx:  
Social History Tobacco Use Smoking Status Former Smoker Smokeless Tobacco Never Used  
,  
Social History Substance and Sexual Activity Alcohol Use No  
,  
Social History Substance and Sexual Activity Drug Use No  
 
 
Past History Past Surgical History: 
Past Surgical History:  
Procedure Laterality Date  ABDOMEN SURGERY PROC UNLISTED    
 hernia  CARDIAC SURG PROCEDURE UNLIST    
 bypass surgery  HX CATARACT REMOVAL    
 HX TONSILLECTOMY Family History: 
Family History Problem Relation Age of Onset  Cancer Mother   
     liver  Hypertension Father  No Known Problems Sister  No Known Problems Brother  No Known Problems Sister   
     brain aneurysym Allergies: 
No Known Allergies Review of Systems Review of Systems Constitutional: Negative for chills, fatigue and fever. HENT: Negative for congestion and rhinorrhea. Eyes: Negative for visual disturbance. Respiratory: Negative for cough, shortness of breath and wheezing. Cardiovascular: Negative for chest pain and palpitations. Gastrointestinal: Negative for abdominal distention, abdominal pain, constipation, diarrhea, nausea and vomiting. Endocrine: Negative. Genitourinary: Negative for difficulty urinating and dysuria. Musculoskeletal: Negative. Skin: Negative for rash. Neurological: Negative for dizziness, weakness and light-headedness. Psychiatric/Behavioral: Positive for behavioral problems (per friend and EMS). Negative for suicidal ideas. Physical Exam  
Physical Exam  
Constitutional: He is oriented to person, place, and time. He appears well-developed and well-nourished. No distress. HENT:  
Head: Normocephalic and atraumatic. Mouth/Throat: Oropharynx is clear and moist.  
Eyes: Conjunctivae and EOM are normal.  
Neck: Neck supple. No JVD present. No tracheal deviation present. Cardiovascular: Normal rate, regular rhythm and intact distal pulses. Exam reveals no gallop and no friction rub. No murmur heard. Pulmonary/Chest: Effort normal and breath sounds normal. No stridor. No respiratory distress. He has no wheezes. Abdominal: Soft. Bowel sounds are normal. He exhibits no distension and no mass. There is no tenderness. There is no guarding. Musculoskeletal: Normal range of motion. He exhibits no edema or tenderness. No deformity Neurological: He is alert and oriented to person, place, and time. He has normal strength. No focal deficits. 5/5 strength globally with sensations intact in BLE and BUE. Skin: Skin is warm, dry and intact. No rash noted. Psychiatric: He has a normal mood and affect. His behavior is normal. Judgment and thought content normal.  
Nursing note and vitals reviewed. Diagnostic Study Results Labs - Recent Results (from the past 12 hour(s)) EKG, 12 LEAD, INITIAL Collection Time: 01/27/19  5:04 PM  
Result Value Ref Range Ventricular Rate 50 BPM  
 Atrial Rate 50 BPM  
 P-R Interval 128 ms QRS Duration 122 ms  
 Q-T Interval 492 ms QTC Calculation (Bezet) 448 ms Calculated P Axis 11 degrees Calculated R Axis -15 degrees Calculated T Axis 22 degrees Diagnosis ** Poor data quality, interpretation may be adversely affected Sinus bradycardia Right bundle branch block Possible Inferior infarct (cited on or before 30-NOV-2018) When compared with ECG of 30-NOV-2018 17:05, 
Questionable change in initial forces of Inferior leads METABOLIC PANEL, COMPREHENSIVE Collection Time: 01/27/19  5:49 PM  
Result Value Ref Range Sodium 140 136 - 145 mmol/L Potassium 4.8 3.5 - 5.1 mmol/L Chloride 107 97 - 108 mmol/L  
 CO2 26 21 - 32 mmol/L Anion gap 7 5 - 15 mmol/L Glucose 98 65 - 100 mg/dL BUN 32 (H) 6 - 20 MG/DL Creatinine 2.17 (H) 0.70 - 1.30 MG/DL  
 BUN/Creatinine ratio 15 12 - 20 GFR est AA 35 (L) >60 ml/min/1.73m2 GFR est non-AA 29 (L) >60 ml/min/1.73m2 Calcium 8.7 8.5 - 10.1 MG/DL Bilirubin, total 0.5 0.2 - 1.0 MG/DL  
 ALT (SGPT) 38 12 - 78 U/L  
 AST (SGOT) 30 15 - 37 U/L Alk. phosphatase 80 45 - 117 U/L Protein, total 7.1 6.4 - 8.2 g/dL Albumin 3.7 3.5 - 5.0 g/dL Globulin 3.4 2.0 - 4.0 g/dL A-G Ratio 1.1 1.1 - 2.2    
CBC WITH AUTOMATED DIFF Collection Time: 01/27/19  5:49 PM  
Result Value Ref Range WBC 7.1 4.1 - 11.1 K/uL  
 RBC 3.32 (L) 4.10 - 5.70 M/uL  
 HGB 11.6 (L) 12.1 - 17.0 g/dL HCT 34.7 (L) 36.6 - 50.3 % .5 (H) 80.0 - 99.0 FL  
 MCH 34.9 (H) 26.0 - 34.0 PG  
 MCHC 33.4 30.0 - 36.5 g/dL  
 RDW 13.9 11.5 - 14.5 % PLATELET 590 525 - 047 K/uL MPV 10.2 8.9 - 12.9 FL  
 NRBC 0.0 0  WBC ABSOLUTE NRBC 0.00 0.00 - 0.01 K/uL NEUTROPHILS 75 32 - 75 % LYMPHOCYTES 14 12 - 49 % MONOCYTES 10 5 - 13 % EOSINOPHILS 1 0 - 7 % BASOPHILS 0 0 - 1 % IMMATURE GRANULOCYTES 0 0.0 - 0.5 % ABS. NEUTROPHILS 5.3 1.8 - 8.0 K/UL  
 ABS. LYMPHOCYTES 1.0 0.8 - 3.5 K/UL  
 ABS. MONOCYTES 0.7 0.0 - 1.0 K/UL  
 ABS. EOSINOPHILS 0.1 0.0 - 0.4 K/UL  
 ABS. BASOPHILS 0.0 0.0 - 0.1 K/UL  
 ABS. IMM. GRANS. 0.0 0.00 - 0.04 K/UL  
 DF AUTOMATED URINALYSIS W/ REFLEX CULTURE Collection Time: 01/27/19  5:59 PM  
Result Value Ref Range Color YELLOW/STRAW Appearance CLEAR CLEAR Specific gravity 1.018 1.003 - 1.030    
 pH (UA) 6.5 5.0 - 8.0 Protein NEGATIVE  NEG mg/dL Glucose NEGATIVE  NEG mg/dL Ketone NEGATIVE  NEG mg/dL Bilirubin NEGATIVE  NEG Blood NEGATIVE  NEG Urobilinogen 0.2 0.2 - 1.0 EU/dL Nitrites NEGATIVE  NEG Leukocyte Esterase NEGATIVE  NEG    
 WBC 0-4 0 - 4 /hpf  
 RBC 0-5 0 - 5 /hpf Epithelial cells FEW FEW /lpf Bacteria NEGATIVE  NEG /hpf  
 UA:UC IF INDICATED CULTURE NOT INDICATED BY UA RESULT CNI Hyaline cast 0-2 0 - 5 /lpf Radiologic Studies -  
CT HEAD WO CONT Final Result IMPRESSION: No acute intracranial hemorrhage, mass or infarct. Stable pattern of  
atrophy and chronic white matter change most compatible with small vessel  
ischemic and/or senescent change. Intracranial atherosclerosis. CT Results  (Last 48 hours) 01/27/19 1828  CT HEAD WO CONT Final result Impression:  IMPRESSION: No acute intracranial hemorrhage, mass or infarct. Stable pattern of  
atrophy and chronic white matter change most compatible with small vessel ischemic and/or senescent change. Intracranial atherosclerosis. Narrative:  EXAM: Brain CT without contrast.  
   
INDICATION: Confusion/delirium, altered LOC, unexplained TECHNIQUE: Noncontrast CT of the brain is performed with 5 mm collimation. Bone  
algorithm axial and sagittal and coronal reconstructions performed and  
evaluated. CT dose reduction was achieved through use of a standardized  
protocol tailored for this examination and automatic exposure control for dose  
modulation. COMPARISON: CT brain 11/30/2018 and MRI 12/1/2018. FINDINGS: There is no acute intracranial hemorrhage, mass, mass effect or  
herniation. Ventricles and sulci show no significant change in proportionate and  
symmetric prominence. There is no significant change in pattern of  
periventricular white matter hypodensity. The gray-white matter differentiation  
is well-preserved. Atherosclerotic calcifications are seen within the carotid  
siphons and vertebral arteries. The mastoid air cells are well pneumatized. The  
visualized paranasal sinuses are normal.  
   
  
  
 
CXR Results  (Last 48 hours) None Medical Decision Making I am the first provider for this patient. I reviewed the vital signs, available nursing notes, past medical history, past surgical history, family history and social history. Vital Signs-Reviewed the patient's vital signs. Patient Vitals for the past 12 hrs: 
 Temp Pulse Resp BP SpO2  
01/27/19 1850 98.2 °F (36.8 °C) (!) 49 14 134/71 99 % 01/27/19 1845  (!) 50 14 134/71 99 % 01/27/19 1657 98.4 °F (36.9 °C) (!) 50 16 130/73 99 % Pulse Oximetry Analysis - 99% on RA 
 
EKG interpretation: (Preliminary) 17:04 Rhythm: sinus bradycardia; and regular . Rate (approx.): 50; Axis: normal; WI interval: normal; QRS interval: widened QRS; ST/T wave: normal; Other findings: RBBB; non-ischemic. Written by Abena Crawley ED Scribe, as dictated by Theador DO Doris. Records Reviewed: Nursing Notes, Old Medical Records, Previous electrocardiograms, Previous Radiology Studies and Previous Laboratory Studies Provider Notes (Medical Decision Making): Pt presenting with intermittent AMS. Pt is currently AAOX3 on exam, has no focal neurologic deficits. Per chart review patient was admitted in Dec. 2018 for AMS, was found to have a UTI and diagnosed with dementia. Pt was evaluated by neurology, had an MRI and EEG which were normal. Pt was discharged to [facility] and was supposed to move to [place] with his son after discharge. He was discharged on 12/19 but apparently went home instead of to [place] because he \"changed his mind\". Pt did not end up getting home health because care management was not informed of his change of plans. Will check labs, ua, head CT to eval for other etiology of AMS, but this is likely patient's ongoing dementia. ED Course:  
Initial assessment performed. The patients presenting problems have been discussed, and they are in agreement with the care plan formulated and outlined with them. I have encouraged them to ask questions as they arise throughout their visit. Progress Notes: 
8:15 PM 
Pt is still oriented. Critical Care Time:  
0 minutes. Disposition: 
Discharge Note: 
8:35 PM 
The patient has been re-evaluated and is ready for discharge. Reviewed available results with patient. Counseled patient/parent/guardian on diagnosis and care plan. Patient has expressed understanding, and all questions have been answered. Patient agrees with plan and agrees to follow up as recommended, or return to the ED if their symptoms worsen. Discharge instructions have been provided and explained to the patient, along with reasons to return to the ED. PLAN: 
1. Current Discharge Medication List  
  
 
2. Follow-up Information Follow up With Specialties Details Why Contact Info Umberto Cohen MD Internal Medicine Schedule an appointment as soon as possible for a visit in 1 day  [address] [facility] [address]. [phone] [facility] EMERGENCY DEPT Emergency Medicine  As needed, If symptoms worsen [address] [facility] 
[phone] Return to ED if worse Diagnosis Clinical Impression: 1. Dementia with behavioral disturbance, unspecified dementia type Attestations: This note is prepared by Luis Devlin, acting as scribe for DO Nimco Rodriguez DO: The scribe's documentation has been prepared under my direction and personally reviewed by me in its entirety. I confirm that the note above accurately reflects all work, treatment, procedures, and medical decision making performed by me.

## 2019-01-28 ENCOUNTER — HOSPITAL ENCOUNTER (INPATIENT)
Age: 84
LOS: 3 days | Discharge: NURSING, REHAB OR LONG-TERM CARE | DRG: 308 | End: 2019-02-02
Attending: EMERGENCY MEDICINE | Admitting: INTERNAL MEDICINE
Payer: MEDICARE

## 2019-01-28 ENCOUNTER — APPOINTMENT (OUTPATIENT)
Dept: GENERAL RADIOLOGY | Age: 84
DRG: 308 | End: 2019-01-28
Attending: EMERGENCY MEDICINE
Payer: MEDICARE

## 2019-01-28 DIAGNOSIS — I67.89 CEREBRAL MICROVASCULAR DISEASE: ICD-10-CM

## 2019-01-28 DIAGNOSIS — R62.7 FAILURE TO THRIVE IN ADULT: Primary | ICD-10-CM

## 2019-01-28 DIAGNOSIS — I65.23 BILATERAL CAROTID ARTERY STENOSIS: ICD-10-CM

## 2019-01-28 DIAGNOSIS — F03.90 DEMENTIA WITHOUT BEHAVIORAL DISTURBANCE, UNSPECIFIED DEMENTIA TYPE: ICD-10-CM

## 2019-01-28 DIAGNOSIS — R26.81 UNSTEADY GAIT: ICD-10-CM

## 2019-01-28 DIAGNOSIS — N17.9 ACUTE RENAL FAILURE SUPERIMPOSED ON STAGE 3 CHRONIC KIDNEY DISEASE, UNSPECIFIED ACUTE RENAL FAILURE TYPE: ICD-10-CM

## 2019-01-28 DIAGNOSIS — K44.9 HIATAL HERNIA: ICD-10-CM

## 2019-01-28 DIAGNOSIS — N18.3 ACUTE RENAL FAILURE SUPERIMPOSED ON STAGE 3 CHRONIC KIDNEY DISEASE, UNSPECIFIED ACUTE RENAL FAILURE TYPE: ICD-10-CM

## 2019-01-28 LAB
ALBUMIN SERPL-MCNC: 3.8 G/DL (ref 3.5–5)
ALBUMIN/GLOB SERPL: 1 {RATIO} (ref 1.1–2.2)
ALP SERPL-CCNC: 84 U/L (ref 45–117)
ALT SERPL-CCNC: 34 U/L (ref 12–78)
ANION GAP SERPL CALC-SCNC: 9 MMOL/L (ref 5–15)
AST SERPL-CCNC: 28 U/L (ref 15–37)
ATRIAL RATE: 50 BPM
BASOPHILS # BLD: 0 K/UL (ref 0–0.1)
BASOPHILS NFR BLD: 1 % (ref 0–1)
BILIRUB SERPL-MCNC: 0.9 MG/DL (ref 0.2–1)
BUN SERPL-MCNC: 33 MG/DL (ref 6–20)
BUN/CREAT SERPL: 16 (ref 12–20)
CALCIUM SERPL-MCNC: 8.7 MG/DL (ref 8.5–10.1)
CALCULATED P AXIS, ECG09: 11 DEGREES
CALCULATED R AXIS, ECG10: -15 DEGREES
CALCULATED T AXIS, ECG11: 22 DEGREES
CHLORIDE SERPL-SCNC: 107 MMOL/L (ref 97–108)
CO2 SERPL-SCNC: 24 MMOL/L (ref 21–32)
CREAT SERPL-MCNC: 2.12 MG/DL (ref 0.7–1.3)
DIAGNOSIS, 93000: NORMAL
DIFFERENTIAL METHOD BLD: ABNORMAL
EOSINOPHIL # BLD: 0.1 K/UL (ref 0–0.4)
EOSINOPHIL NFR BLD: 2 % (ref 0–7)
ERYTHROCYTE [DISTWIDTH] IN BLOOD BY AUTOMATED COUNT: 14 % (ref 11.5–14.5)
GLOBULIN SER CALC-MCNC: 3.7 G/DL (ref 2–4)
GLUCOSE SERPL-MCNC: 80 MG/DL (ref 65–100)
HCT VFR BLD AUTO: 37.3 % (ref 36.6–50.3)
HGB BLD-MCNC: 12.4 G/DL (ref 12.1–17)
IMM GRANULOCYTES # BLD AUTO: 0 K/UL (ref 0–0.04)
IMM GRANULOCYTES NFR BLD AUTO: 0 % (ref 0–0.5)
LYMPHOCYTES # BLD: 1.3 K/UL (ref 0.8–3.5)
LYMPHOCYTES NFR BLD: 20 % (ref 12–49)
MCH RBC QN AUTO: 34.8 PG (ref 26–34)
MCHC RBC AUTO-ENTMCNC: 33.2 G/DL (ref 30–36.5)
MCV RBC AUTO: 104.8 FL (ref 80–99)
MONOCYTES # BLD: 0.8 K/UL (ref 0–1)
MONOCYTES NFR BLD: 12 % (ref 5–13)
NEUTS SEG # BLD: 4.3 K/UL (ref 1.8–8)
NEUTS SEG NFR BLD: 66 % (ref 32–75)
NRBC # BLD: 0 K/UL (ref 0–0.01)
NRBC BLD-RTO: 0 PER 100 WBC
P-R INTERVAL, ECG05: 128 MS
PLATELET # BLD AUTO: 163 K/UL (ref 150–400)
PMV BLD AUTO: 10 FL (ref 8.9–12.9)
POTASSIUM SERPL-SCNC: 4 MMOL/L (ref 3.5–5.1)
PROT SERPL-MCNC: 7.5 G/DL (ref 6.4–8.2)
Q-T INTERVAL, ECG07: 492 MS
QRS DURATION, ECG06: 122 MS
QTC CALCULATION (BEZET), ECG08: 448 MS
RBC # BLD AUTO: 3.56 M/UL (ref 4.1–5.7)
SODIUM SERPL-SCNC: 140 MMOL/L (ref 136–145)
VENTRICULAR RATE, ECG03: 50 BPM
WBC # BLD AUTO: 6.6 K/UL (ref 4.1–11.1)

## 2019-01-28 PROCEDURE — 80053 COMPREHEN METABOLIC PANEL: CPT

## 2019-01-28 PROCEDURE — 36415 COLL VENOUS BLD VENIPUNCTURE: CPT

## 2019-01-28 PROCEDURE — 99285 EMERGENCY DEPT VISIT HI MDM: CPT

## 2019-01-28 PROCEDURE — 85025 COMPLETE CBC W/AUTO DIFF WBC: CPT

## 2019-01-28 PROCEDURE — 99284 EMERGENCY DEPT VISIT MOD MDM: CPT

## 2019-01-28 PROCEDURE — 71045 X-RAY EXAM CHEST 1 VIEW: CPT

## 2019-01-28 PROCEDURE — 93005 ELECTROCARDIOGRAM TRACING: CPT

## 2019-01-28 NOTE — DISCHARGE INSTRUCTIONS
Patient Education        Dementia: Care Instructions  Your Care Instructions    Dementia is a loss of mental skills that affects your daily life. It is different than the occasional trouble with memory that is part of aging. You may find it hard to remember things that you feel you should be able to remember. Or you may feel that your mind is just not working as well as usual.  Finding out that you have dementia is a shock. You may be afraid and worried about how the condition will change your life. Although there is no cure at this time, medicine may slow memory loss and improve thinking for a while. Other medicines may be able to help you sleep or cope with depression and behavior changes. Dementia often gets worse slowly. But it can get worse quickly. As dementia gets worse, it may become harder to do common things that take planning, like making a list and going shopping. Over time, the disease may make it hard for you to take care of yourself. Some people with dementia need others to help care for them. Dementia is different for everyone. You may be able to function well for a long time. In the early stage of the condition, you can do things at home to make life easier and safer. You also can keep doing your hobbies and other activities. Many people find comfort in planning now for their future needs. Follow-up care is a key part of your treatment and safety. Be sure to make and go to all appointments, and call your doctor if you are having problems. It's also a good idea to know your test results and keep a list of the medicines you take. How can you care for yourself at home? · Take your medicines exactly as prescribed. Call your doctor if you think you are having a problem with your medicine. · Eat healthy foods. Eat lots of whole grains, fruits, and vegetables every day.  If you are not hungry, try snacks or nutritional drinks such as Boost, Ensure, or Sustacal.  · If you have problems sleeping:  ? Try not to nap too close to your bedtime. ? Exercise regularly. Walking is a good choice. ? Try a glass of warm milk or caffeine-free herbal tea before bed. · Do tasks and activities during the time of day when you feel your best. It may help to develop a daily routine. · Post labels, lists, and sticky notes to help you remember things. Write your activities on a calendar you can easily find. Put your clock where you can easily see it. · Stay active. Take walks in familiar places, or with friends or loved ones. Try to stay active mentally too. Read and work crossword puzzles if you enjoy these activities. · Do not drive unless you can pass an on-road driving test. If you are not sure if you are safe to drive, your state 's license bureau can test you. · Keep a cordless phone and a flashlight with new batteries by your bed. If possible, put a phone in each of the main rooms of your house, or carry a cell phone in case you fall and cannot reach a phone. Or, you can wear a device around your neck or wrist. You push a button that sends a signal for help. Acknowledge your emotions and plan for the future  · Talk openly and honestly with your doctor. · Let yourself grieve. It is common to feel angry, scared, frustrated, anxious, or depressed. · Get emotional support from family, friends, a support group, or a counselor experienced in working with people who have dementia. · Ask for help if you need it. · Plan for the future. ? Talk to your family and doctor about preparing a living will and other important papers while you can make decisions. These papers tell your doctors how to care for you at the end of your life. ? Consider naming a person to make decisions about your care if you are not able to. When should you call for help? Call 911 anytime you think you may need emergency care.  For example, call if:    · You are lost and do not know whom to call.     · You are injured and do not know whom to call.    Call your doctor now or seek immediate medical care if:    · You are more confused or upset than usual.     · You feel like you could hurt yourself because your mind is not working well.   [place] closely for changes in your health, and be sure to contact your doctor if you have any problems. Where can you learn more? Go to http://yann-ravinder.info/. Enter T542 in the search box to learn more about \"Dementia: Care Instructions. \"  Current as of: September 11, 2018  Content Version: 11.9  © 0257-0222 [employer]. Care instructions adapted under license by [employer] (which disclaims liability or warranty for this information). If you have questions about a medical condition or this instruction, always ask your healthcare professional. [address] any warranty or liability for your use of this information. Patient Education        Learning About the Symptoms of Dementia  What is dementia? We all forget things as we get older. Many older people have a slight loss of memory that does not affect their daily lives. But memory loss that gets worse may mean that you have dementia. Dementia is a loss of mental skills that affects your daily life. It can cause problems with memory, problem-solving, and learning. It also can cause problems with thinking and planning. Dementia usually gets worse over time. But how quickly it gets worse is different for each person. Some people stay the same for years. Others lose skills quickly. Your chances of having dementia rise as you get older. But this doesn't mean that everyone will get it. What causes dementia? Dementia is caused by damage to or changes in the brain. Alzheimer's disease is the most common kind of dementia. Alzheimer's causes a steady loss of memory and how well you can speak, think, and do your daily activities.   The second most common kind of dementia is caused by a series of strokes. It's called vascular dementia. It often gets worse step by step. With each new stroke, more mental skills are lost.  Serious head injuries in the past can sometimes lead to dementia, too. What are the symptoms? Usually the first symptom of dementia is memory loss. Often the person who has the memory problem doesn't notice it, but family and friends do. People who have dementia may have increasing trouble with:  · Recalling recent events. They may forget appointments or lose objects. · Recognizing people and places. · Keeping up with conversations and activity. · Finding their way around familiar places, or driving to and from places they know well. · Keeping up personal care such as grooming or bathing. · Planning and carrying out routine tasks. They may have trouble following a recipe or writing a letter or email. What are some next steps? If you are worried about memory loss or changes in your thinking, see your doctor soon. He or she can do a physical exam and ask questions about recent and past illnesses and life events. Think about bringing someone to your doctor's appointment to take notes for you. Your doctor may suggest a series of tests to measure memory changes over time. These tests give the doctor an overall picture of how well your brain is working. The doctor can use the results to decide the best treatment program and help make your life safer and easier. It is important to know that memory loss and changes in thinking can be caused by things other than dementia. These things can include health problems such as depression, a low amount of thyroid hormone, and some infections. When those things are treated, your symptoms can get better. Follow-up care is a key part of your treatment and safety. Be sure to make and go to all appointments, and call your doctor if you are having problems.  It's also a good idea to know your test results and keep a list of the medicines you take.  Where can you learn more? Go to http://yann-ravinder.info/. Enter [id number] [id number] [id number] in the search box to learn more about \"Learning About the Symptoms of Dementia. \"  Current as of: September 11, 2018  Content Version: 11.9  © 4786-7036 [employer]. Care instructions adapted under license by [employer] (which disclaims liability or warranty for this information). If you have questions about a medical condition or this instruction, always ask your healthcare professional. [place] [address] any warranty or liability for your use of this information.

## 2019-01-29 ENCOUNTER — APPOINTMENT (OUTPATIENT)
Dept: VASCULAR SURGERY | Age: 84
DRG: 308 | End: 2019-01-29
Attending: INTERNAL MEDICINE
Payer: MEDICARE

## 2019-01-29 DIAGNOSIS — I10 HYPERTENSION GOAL BP (BLOOD PRESSURE) < 130/80: ICD-10-CM

## 2019-01-29 PROBLEM — I65.23 BILATERAL CAROTID ARTERY STENOSIS: Status: ACTIVE | Noted: 2019-01-29

## 2019-01-29 PROBLEM — F03.90 DEMENTIA (HCC): Status: ACTIVE | Noted: 2019-01-29

## 2019-01-29 PROBLEM — I67.89 CEREBRAL MICROVASCULAR DISEASE: Status: ACTIVE | Noted: 2019-01-29

## 2019-01-29 LAB
APPEARANCE UR: CLEAR
ATRIAL RATE: 65 BPM
BACTERIA URNS QL MICRO: NEGATIVE /HPF
BILIRUB UR QL: NEGATIVE
CALCULATED P AXIS, ECG09: 19 DEGREES
CALCULATED R AXIS, ECG10: -45 DEGREES
CALCULATED T AXIS, ECG11: 21 DEGREES
COLOR UR: ABNORMAL
DIAGNOSIS, 93000: NORMAL
EPITH CASTS URNS QL MICRO: ABNORMAL /LPF
GLUCOSE BLD STRIP.AUTO-MCNC: 93 MG/DL (ref 65–100)
GLUCOSE UR STRIP.AUTO-MCNC: NEGATIVE MG/DL
HGB UR QL STRIP: NEGATIVE
HYALINE CASTS URNS QL MICRO: ABNORMAL /LPF (ref 0–5)
KETONES UR QL STRIP.AUTO: 15 MG/DL
LEFT CCA DIST DIAS: 10.8 CM/S
LEFT CCA DIST SYS: 61 CM/S
LEFT CCA PROX DIAS: 21.1 CM/S
LEFT CCA PROX SYS: 90.6 CM/S
LEFT ECA DIAS: 19 CM/S
LEFT ECA SYS: 57 CM/S
LEFT ICA DIST DIAS: 19.3 CM/S
LEFT ICA DIST SYS: 59 CM/S
LEFT ICA MID DIAS: 11.8 CM/S
LEFT ICA MID SYS: 39 CM/S
LEFT ICA PROX DIAS: 17.1 CM/S
LEFT ICA PROX SYS: 57.9 CM/S
LEFT ICA/CCA SYS: 0.97
LEFT SUBCLAVIAN DIAS: 0 CM/S
LEFT SUBCLAVIAN SYS: 85 CM/S
LEFT VERTEBRAL DIAS: 0 CM/S
LEFT VERTEBRAL SYS: 33 CM/S
LEUKOCYTE ESTERASE UR QL STRIP.AUTO: NEGATIVE
NITRITE UR QL STRIP.AUTO: NEGATIVE
P-R INTERVAL, ECG05: 120 MS
PH UR STRIP: 6 [PH] (ref 5–8)
PROT UR STRIP-MCNC: NEGATIVE MG/DL
Q-T INTERVAL, ECG07: 472 MS
QRS DURATION, ECG06: 132 MS
QTC CALCULATION (BEZET), ECG08: 490 MS
RBC #/AREA URNS HPF: ABNORMAL /HPF (ref 0–5)
RIGHT CCA DIST DIAS: 19.2 CM/S
RIGHT CCA DIST SYS: 62.8 CM/S
RIGHT CCA PROX DIAS: 14 CM/S
RIGHT CCA PROX SYS: 59.7 CM/S
RIGHT ECA DIAS: 12 CM/S
RIGHT ECA SYS: 96 CM/S
RIGHT ICA DIST DIAS: 14.5 CM/S
RIGHT ICA DIST SYS: 38.7 CM/S
RIGHT ICA MID DIAS: 10.2 CM/S
RIGHT ICA MID SYS: 29.1 CM/S
RIGHT ICA PROX DIAS: 11.4 CM/S
RIGHT ICA PROX SYS: 59.1 CM/S
RIGHT ICA/CCA SYS: 0.9
RIGHT SUBCLAVIAN DIAS: 0 CM/S
RIGHT SUBCLAVIAN SYS: 45 CM/S
RIGHT VERTEBRAL DIAS: 13.1 CM/S
RIGHT VERTEBRAL SYS: 50.5 CM/S
SERVICE CMNT-IMP: NORMAL
SP GR UR REFRACTOMETRY: 1.02 (ref 1–1.03)
T4 FREE SERPL-MCNC: 0.4 NG/DL (ref 0.8–1.5)
TSH SERPL DL<=0.05 MIU/L-ACNC: 91.5 UIU/ML (ref 0.36–3.74)
UA: UC IF INDICATED,UAUC: ABNORMAL
UROBILINOGEN UR QL STRIP.AUTO: 0.2 EU/DL (ref 0.2–1)
VENTRICULAR RATE, ECG03: 65 BPM
WBC URNS QL MICRO: ABNORMAL /HPF (ref 0–4)

## 2019-01-29 PROCEDURE — 97530 THERAPEUTIC ACTIVITIES: CPT | Performed by: PHYSICAL THERAPIST

## 2019-01-29 PROCEDURE — 97116 GAIT TRAINING THERAPY: CPT | Performed by: PHYSICAL THERAPIST

## 2019-01-29 PROCEDURE — 36415 COLL VENOUS BLD VENIPUNCTURE: CPT

## 2019-01-29 PROCEDURE — 84439 ASSAY OF FREE THYROXINE: CPT

## 2019-01-29 PROCEDURE — 99218 HC RM OBSERVATION: CPT

## 2019-01-29 PROCEDURE — 97161 PT EVAL LOW COMPLEX 20 MIN: CPT | Performed by: PHYSICAL THERAPIST

## 2019-01-29 PROCEDURE — 74011250636 HC RX REV CODE- 250/636: Performed by: INTERNAL MEDICINE

## 2019-01-29 PROCEDURE — 96372 THER/PROPH/DIAG INJ SC/IM: CPT

## 2019-01-29 PROCEDURE — 93880 EXTRACRANIAL BILAT STUDY: CPT

## 2019-01-29 PROCEDURE — 82962 GLUCOSE BLOOD TEST: CPT

## 2019-01-29 PROCEDURE — 84443 ASSAY THYROID STIM HORMONE: CPT

## 2019-01-29 PROCEDURE — 74011250637 HC RX REV CODE- 250/637: Performed by: INTERNAL MEDICINE

## 2019-01-29 PROCEDURE — 81001 URINALYSIS AUTO W/SCOPE: CPT

## 2019-01-29 RX ORDER — ONDANSETRON 2 MG/ML
4 INJECTION INTRAMUSCULAR; INTRAVENOUS
Status: DISCONTINUED | OUTPATIENT
Start: 2019-01-29 | End: 2019-02-02 | Stop reason: HOSPADM

## 2019-01-29 RX ORDER — MEMANTINE HYDROCHLORIDE 10 MG/1
10 TABLET ORAL 2 TIMES DAILY
Status: DISCONTINUED | OUTPATIENT
Start: 2019-01-29 | End: 2019-02-02 | Stop reason: HOSPADM

## 2019-01-29 RX ORDER — FUROSEMIDE 20 MG/1
20 TABLET ORAL DAILY
Status: DISCONTINUED | OUTPATIENT
Start: 2019-01-29 | End: 2019-02-01

## 2019-01-29 RX ORDER — SODIUM CHLORIDE 0.9 % (FLUSH) 0.9 %
5-40 SYRINGE (ML) INJECTION AS NEEDED
Status: DISCONTINUED | OUTPATIENT
Start: 2019-01-29 | End: 2019-02-02 | Stop reason: HOSPADM

## 2019-01-29 RX ORDER — PANTOPRAZOLE SODIUM 40 MG/1
40 TABLET, DELAYED RELEASE ORAL
Status: DISCONTINUED | OUTPATIENT
Start: 2019-01-29 | End: 2019-02-02 | Stop reason: HOSPADM

## 2019-01-29 RX ORDER — DONEPEZIL HYDROCHLORIDE 5 MG/1
5 TABLET, FILM COATED ORAL
Status: DISCONTINUED | OUTPATIENT
Start: 2019-01-29 | End: 2019-01-30

## 2019-01-29 RX ORDER — AMLODIPINE BESYLATE 5 MG/1
TABLET ORAL
Qty: 90 TAB | Refills: 0 | Status: SHIPPED | OUTPATIENT
Start: 2019-01-29 | End: 2019-02-02

## 2019-01-29 RX ORDER — ACETAMINOPHEN 325 MG/1
650 TABLET ORAL
Status: DISCONTINUED | OUTPATIENT
Start: 2019-01-29 | End: 2019-02-02 | Stop reason: HOSPADM

## 2019-01-29 RX ORDER — SODIUM CHLORIDE 0.9 % (FLUSH) 0.9 %
5-40 SYRINGE (ML) INJECTION EVERY 8 HOURS
Status: DISCONTINUED | OUTPATIENT
Start: 2019-01-29 | End: 2019-02-02 | Stop reason: HOSPADM

## 2019-01-29 RX ORDER — TAMSULOSIN HYDROCHLORIDE 0.4 MG/1
0.4 CAPSULE ORAL DAILY
Status: DISCONTINUED | OUTPATIENT
Start: 2019-01-29 | End: 2019-02-02 | Stop reason: HOSPADM

## 2019-01-29 RX ORDER — HEPARIN SODIUM 5000 [USP'U]/ML
5000 INJECTION, SOLUTION INTRAVENOUS; SUBCUTANEOUS EVERY 8 HOURS
Status: DISCONTINUED | OUTPATIENT
Start: 2019-01-29 | End: 2019-02-02 | Stop reason: HOSPADM

## 2019-01-29 RX ORDER — LEVOTHYROXINE SODIUM 50 UG/1
25 TABLET ORAL
Status: DISCONTINUED | OUTPATIENT
Start: 2019-01-30 | End: 2019-02-01

## 2019-01-29 RX ORDER — ALLOPURINOL 100 MG/1
100 TABLET ORAL DAILY
Status: DISCONTINUED | OUTPATIENT
Start: 2019-01-29 | End: 2019-02-02 | Stop reason: HOSPADM

## 2019-01-29 RX ORDER — METOPROLOL SUCCINATE 25 MG/1
25 TABLET, EXTENDED RELEASE ORAL DAILY
Status: DISCONTINUED | OUTPATIENT
Start: 2019-01-29 | End: 2019-01-30

## 2019-01-29 RX ORDER — AMLODIPINE BESYLATE 5 MG/1
5 TABLET ORAL DAILY
Status: DISCONTINUED | OUTPATIENT
Start: 2019-01-29 | End: 2019-01-30

## 2019-01-29 RX ADMIN — MEMANTINE 10 MG: 10 TABLET ORAL at 19:37

## 2019-01-29 RX ADMIN — ALLOPURINOL 100 MG: 100 TABLET ORAL at 09:26

## 2019-01-29 RX ADMIN — PANTOPRAZOLE SODIUM 40 MG: 40 TABLET, DELAYED RELEASE ORAL at 09:30

## 2019-01-29 RX ADMIN — DONEPEZIL HYDROCHLORIDE 5 MG: 5 TABLET, FILM COATED ORAL at 22:08

## 2019-01-29 RX ADMIN — METOPROLOL SUCCINATE 25 MG: 50 TABLET, EXTENDED RELEASE ORAL at 09:31

## 2019-01-29 RX ADMIN — TAMSULOSIN HYDROCHLORIDE 0.4 MG: 0.4 CAPSULE ORAL at 09:30

## 2019-01-29 RX ADMIN — Medication 10 ML: at 19:37

## 2019-01-29 RX ADMIN — HEPARIN SODIUM 5000 UNITS: 5000 INJECTION INTRAVENOUS; SUBCUTANEOUS at 10:54

## 2019-01-29 RX ADMIN — HEPARIN SODIUM 5000 UNITS: 5000 INJECTION INTRAVENOUS; SUBCUTANEOUS at 22:08

## 2019-01-29 RX ADMIN — MEMANTINE 10 MG: 10 TABLET ORAL at 09:26

## 2019-01-29 RX ADMIN — DONEPEZIL HYDROCHLORIDE 5 MG: 5 TABLET, FILM COATED ORAL at 05:37

## 2019-01-29 RX ADMIN — AMLODIPINE BESYLATE 5 MG: 5 TABLET ORAL at 09:31

## 2019-01-29 RX ADMIN — FUROSEMIDE 20 MG: 40 TABLET ORAL at 10:54

## 2019-01-29 RX ADMIN — HEPARIN SODIUM 5000 UNITS: 5000 INJECTION INTRAVENOUS; SUBCUTANEOUS at 05:22

## 2019-01-29 RX ADMIN — Medication 10 ML: at 22:09

## 2019-01-29 RX ADMIN — Medication 10 ML: at 09:32

## 2019-01-29 NOTE — CONSULTS
[address] Shannan Winter  MR#: [id number]  : 03/15/1933  ACCOUNT #: [de-identified]   DATE OF SERVICE: 2019    REFERRING PHYSICIAN:  Millie Cates MD    REASON FOR CONSULTATION:  Bradycardia. HISTORY OF PRESENT ILLNESS:  This is an 59-year-old male with a history of hypertension, chronic kidney disease stage III, and chronic altered mental status (dementia) who was brought to the emergency room. He has had a history of hypothyroidism in the past and again lab work shows that he has significant hypothyroidism. There is no evidence of any supplementation. He does not remember why he was brought here and has no complaints at this time. No syncope, dizziness, palpitations. No chest pain, orthopnea, paroxysmal nocturnal dyspnea, lower extremity edema. Does not endorse any TIA or stroke-like symptoms at this time. It is noted that he may not remember things distant from today. He did have an ultrasound of his heart in 2018, which showed a normal left ventricular systolic function without wall motion abnormality. There was moderate mitral regurgitation and mild tricuspid regurgitation. Here in the emergency room, telemetry reveals a heart rate in the 70-80 beats per minute range. An ECG shows what looks like bifascicular block. He did have a recent hospital admission in 2018 that lasted several days due to an acute metabolic encephalopathy atop chronic dementia. He possibly had a urinary tract infection and definitely had dehydration due to diarrhea (gastroenteritis) at the time. A neurology consultation saw him at that time. Aricept was started. ALLERGIES:  NO KNOWN DRUG ALLERGIES. MEDICATIONS:  1. Allopurinol 100 mg daily. 2.  Amlodipine 5 mg daily. 3.  Donepezil 5 mg at bedtime. 4.  Furosemide 20 mg daily. 5.  Heparin injection 5000 units every 8 hours. 6.  Memantine 10 mg twice daily.   7.  Metoprolol succinate 25 mg daily (decreased from his outpatient dose). 8.  Pantoprazole 40 mg daily at breakfast.  9.  Tamsulosin 0.4 mg daily. PAST MEDICAL HISTORY:  1. As above. 2.  Prostatic enlargement. 3.  Unsteady gait. 4.  FULL CODE. FAMILY HISTORY:  Noncontributory. SOCIAL HISTORY:  Noncontributory. He is a former smoker. REVIEW OF SYSTEMS:  Due to his dementia, he does not endorse a significant review of systems across 13 body systems. All are negative according to him. PHYSICAL EXAMINATION:  VITAL SIGNS:  Temperature 97.6 Fahrenheit, pulse 52, blood pressure 130/85, respirations 16, oxygen saturation 100% on room air, weight 86.2 kilograms. GENERAL:  Nondiaphoretic, not in acute distress, bearded male. NECK:  Supple, no palpable thyromegaly, nontender. HEENT:  No scleral icterus, mucous membranes are moist, conjunctivae are pink, no xanthelasma. RESPIRATORY:  Unlabored. Clear to auscultation bilaterally, symmetric air movement. CARDIAC:  Regular rate and rhythm, soft early peaking systolic murmur without radiation, no rub or gallop. No jugular venous distention or carotid bruit. Palpable radial and PT pulses bilaterally. ABDOMEN:  Soft, nontender, nondistended. EXTREMITIES:  Without cyanosis or clubbing. No amputations. SKIN:  No jaundice, petechia or purpura. He has tattoos. MUSCULOSKELETAL:  Unremarkable, normal bulk and tone. NEUROLOGIC:  Awake, appropriate, oriented to name. Grossly nonfocal.  No resting tremor. LABORATORY DATA:  White blood cell 6.6, hemoglobin 12.4, hematocrit 37.3, platelets 952. Free T4 is 0.4, which is low. TSH is 91.5 which is high. Sodium 140, potassium 4.0, glucose 80, creatinine 2.12. Total bilirubin 0.9, albumin 3.8, AST 28, ALT 34, alkaline phosphatase 84. The admission ECG was reviewed. Sinus rhythm with bifascicular block (right bundle branch block and suggestion of left anterior fascicular block) was noted.   The OK interval was normal.    Telemetry was reviewed. There were no significant events. IMPRESSION:  1. Mild sinus bradycardia which may be related to his hypothyroidism. He does not appear to be supplemented at this time. 2.  Bifascicular block which is chronic. No evidence of high grade or complete atrioventricular block. 3.  Moderate nonrheumatic mitral regurgitation, likely asymptomatic. I do not think this is an immediate problem for him. 4.  Hypertensive heart disease without heart failure, but with chronic kidney disease stage III. He is almost a stage IV. 5.  Dementia. Does not appear to have a superimposed encephalopathy at this time. 6.  No evidence of urinary tract infection. He may not be eating as much as he should. There were ketones that were trace but detectable in the urine today. 7.  Enlarged prostate, on medication. RECOMMENDATIONS:  1.  I think lowering his outpatient metoprolol dose makes sense. 2.  I do not think he needs a pacemaker at this time. 3.  Treating his hypothyroidism should bring the sinus rate up some. 4.  Further evaluation and treatment pending his course. JOSE Chowdhury Poster, MD [facility] [place] / [place]  D: 01/29/2019 14:01     T: 01/29/2019 14:49  JOB #: [id number]

## 2019-01-29 NOTE — PROGRESS NOTES
Patient seen and examined. Agree with H and P. Appreciate cardiology input. Synthroid started because of untreated hypothyroidism. Monitor on tele for bradycardia.

## 2019-01-29 NOTE — H&P
Hospitalist Admission NoteNAME: Chacho Jimenez :  3/15/1933 MRN:  [id number] Date/Time:  2019 3:20 AM 
 
Patient PCP: Ismael Larose MD 
______________________________________________________________________ Given the patient's current clinical presentation, I have a high level of concern for decompensation if discharged from the emergency department. Complex decision making was performed, which includes reviewing the patient's available past medical records, laboratory results, and x-ray films. My assessment of this patient's clinical condition and my plan of care is as follows. Assessment / Plan: 
EMS was called as someone was concerned with patient's wellness (family did not bring him) in. Patient does not know why he is here at the hospital. Patient will be admitted to Observation and we will further investigate the below issues. URENA: one of patient's most persistent issues, has been compliaing of for last year per daughter, Jasen Doe. ? Relation to Hiatal Hernia, ? Relation to Moderate Mitral Regurgitation; CXR Clear TTE from 18 showed: \"SUMMARY: Left ventricle: Systolic function was normal. Ejection fraction was estimated in the range of 55 % to 60 %. There were no regional wall motion abnormalities. Mitral valve: There was moderate regurgitation. Tricuspid valve: There was mild regurgitation. \" 
-consult Cardiology for their impression, ? Patient candidate for stress test 
-continue metoprolol (dose decreased with bradycardia) and daily lasix 
-start PPI Elevated TSH: TSH was 84 on ; I do not see that patient si on Levothyroxine, Could not confirm with soni Moses whether or not patient on thyroid replacement 
-check TSH and Free T4 
-suspect patient will need levothyroxine Unsteady Gait:  
-stop seroquel as antipsychotics can cause gait disturbance 
-recent B12 checked and is wnl 
-check orthostatics 
-PT ordered -MRI from December 2018 reviewed, no indication to repeat at this time 
-get dopplers of carotids, consider MRA to rule out vertebrobasilar insufficiency CKD stage III: 
BPH: 
-renal function appears to be at baseline, continue to monitor 
-continue flomax 
-bladder checks ordered Dementia: 
-continue Low dose Aricept and Namenda 
-if patient remains Bradycardic then stop Aricept Code Status: Default to Full; needs to be clarified with Claudean Silk (Son) did not answer phone when I called at 3 am on 1/29 Surrogate Decision Maker: Claudean Silk DVT Prophylaxis: sq heparin GI Prophylaxis: not indicated Baseline: declining functional status Subjective: CHIEF COMPLAINT: patient unsure HISTORY OF PRESENT ILLNESS:    
Rola Westbrook is a 80 y.o.  male who presents via EMS. Patient is hard of hearing and has dementia but still lives alone. Patient's history is very limited and his non-POA daughter did answer the phone and provide some additional history. Patient lives alone and his son from [place] comes up every weekend to check on him and get his meds straight for the week. Patient's daughter has offered for him to live with her in the past but he declined. Patient is uncertain why he is here. From what I could gather from daughter it sounds like someone called EMS for an adult wellness check and EMS brought him to the ED. Patient is able to communicate well enough to tell me that when he stands that he gets SOB and also has trouble walking straight. Daughter also voices her concern over his SOB. Patient denies any pain. Patient tries to tell me additional information but makes loose associations and admits that he gets frustrated when he can not understand me. We were asked to admit for work up and evaluation of the above problems. Below history predominantly per chart review Past Medical History:  
Diagnosis Date  Abdominal aortic aneurysm ([address]  Arthritis  CAD (coronary artery disease) bypass  Hypertension  Other ill-defined conditions(799.89)   
 gout  Other ill-defined conditions(799.89)   
 high cholesterol Past Surgical History:  
Procedure Laterality Date  ABDOMEN SURGERY PROC UNLISTED    
 hernia  CARDIAC SURG PROCEDURE UNLIST    
 bypass surgery  HX CATARACT REMOVAL    
 HX TONSILLECTOMY Social History Tobacco Use  Smoking status: Former Smoker  Smokeless tobacco: Never Used Substance Use Topics  Alcohol use: No  
  
 
Family History Problem Relation Age of Onset  Cancer Mother   
     liver  Hypertension Father  No Known Problems Sister  No Known Problems Brother  No Known Problems Sister   
     brain aneurysym No Known Allergies Prior to Admission medications Medication Sig Start Date End Date Taking? Authorizing Provider  
donepezil (ARICEPT) 5 mg tablet Take 1 Tab by mouth nightly. 12/4/18   Irwin Boo,   
metoprolol succinate (TOPROL-XL) 50 mg XL tablet TAKE 1 TABLET BY MOUTH DAILY. 10/23/18   Danielle Bob MD  
tamsulosin (FLOMAX) 0.4 mg capsule TAKE 1 CAPSULE BY MOUTH EVERY DAY 10/23/18   Danielle Bob MD  
amLODIPine (NORVASC) 5 mg tablet Take 1 Tab by mouth daily. 10/5/18   Danielle Bob MD  
allopurinol (ZYLOPRIM) 100 mg tablet Take 1 Tab by mouth daily. 9/24/18   Danielle Bob MD  
memantine (NAMENDA) 10 mg tablet Take 1 Tab by mouth two (2) times a day. 10mg Twice daily 4/20/18   Danielle Bob MD  
QUEtiapine (SEROQUEL) 25 mg tablet Take 0.5 Tabs by mouth nightly. 4/20/18   Danielle Bob MD  
furosemide (LASIX) 20 mg tablet TAKE 1 TABLET BY MOUTH EVERY OTHER DAY 4/19/18   Danielle Bob MD  
 
 
REVIEW OF SYSTEMS:    
I am not able to complete the review of systems because: The patient is intubated and sedated The patient has altered mental status due to his acute medical problems The patient has baseline aphasia from prior stroke(s) The patient has baseline difficulty hearing and some dementia and I am not able to obtain a ROS The patient is in acute medical distress and unable to provide information Objective: VITALS:   
Visit Vitals /80 Pulse (!) 47 Temp 97.6 °F (36.4 °C) Resp 18 Ht 6' (1.829 m) Wt 86.2 kg (190 lb) SpO2 97% BMI 25.77 kg/m² PHYSICAL EXAM: 
 
General:    Alert, cooperative, no distress, appears stated age. HEENT: Atraumatic, anicteric sclerae, pink conjunctivae No oral ulcers, mucosa moist, throat clear, dentition fair Neck:  Supple, symmetrical,  Thyroid not enlarged Lungs:   Clear to auscultation bilaterally. No Wheezing or Rhonchi. No rales. Chest wall:  No tenderness  No Accessory muscle use. Heart:   Regular  Rhythm with mild bradycardia,  +  murmur   No edema Abdomen:   Soft, non-tender. Not distended. Bowel sounds normal 
Extremities: No cyanosis. No clubbing,   
  Skin turgor normal, Capillary refill normal 
Skin:     Not pale. Not Jaundiced  No rashes Psych:  Poor insight. Not depressed. Not anxious or agitated. Neurologic: EOMs intact. No facial asymmetry. No aphasia or slurred speech. Symmetrical strength, No focal neurologic deficits. Alert and it is difficult to assess orientation. _______________________________________________________________________ Care Plan discussed with: 
  Comments Patient x Family  x Tammie Milian RN Care Manager Consultant:     
_______________________________________________________________________ Expected  Disposition:  
Home with Family ? HH/PT/OT/RN HH  
SNF/LTC   
MARCELLE   
________________________________________________________________________ TOTAL TIME:  60 Minutes Critical Care Provided     Minutes non procedure based   Comments  
 x Reviewed previous records  
>50% of visit spent in counseling and coordination of care x Discussion with patient and/or family and questions answered 
  
 
________________________________________________________________________ Signed: Lg Garcia MD 
 
Procedures: see electronic medical records for all procedures/Xrays and details which were not copied into this note but were reviewed prior to creation of Plan. LAB DATA REVIEWED:   
Recent Results (from the past 24 hour(s)) CBC WITH AUTOMATED DIFF Collection Time: 01/28/19  5:08 PM  
Result Value Ref Range WBC 6.6 4.1 - 11.1 K/uL  
 RBC 3.56 (L) 4.10 - 5.70 M/uL  
 HGB 12.4 12.1 - 17.0 g/dL HCT 37.3 36.6 - 50.3 % .8 (H) 80.0 - 99.0 FL  
 MCH 34.8 (H) 26.0 - 34.0 PG  
 MCHC 33.2 30.0 - 36.5 g/dL  
 RDW 14.0 11.5 - 14.5 % PLATELET 622 566 - 803 K/uL MPV 10.0 8.9 - 12.9 FL  
 NRBC 0.0 0  WBC ABSOLUTE NRBC 0.00 0.00 - 0.01 K/uL NEUTROPHILS 66 32 - 75 % LYMPHOCYTES 20 12 - 49 % MONOCYTES 12 5 - 13 % EOSINOPHILS 2 0 - 7 % BASOPHILS 1 0 - 1 % IMMATURE GRANULOCYTES 0 0.0 - 0.5 % ABS. NEUTROPHILS 4.3 1.8 - 8.0 K/UL  
 ABS. LYMPHOCYTES 1.3 0.8 - 3.5 K/UL  
 ABS. MONOCYTES 0.8 0.0 - 1.0 K/UL  
 ABS. EOSINOPHILS 0.1 0.0 - 0.4 K/UL  
 ABS. BASOPHILS 0.0 0.0 - 0.1 K/UL  
 ABS. IMM. GRANS. 0.0 0.00 - 0.04 K/UL  
 DF AUTOMATED METABOLIC PANEL, COMPREHENSIVE Collection Time: 01/28/19  5:08 PM  
Result Value Ref Range Sodium 140 136 - 145 mmol/L Potassium 4.0 3.5 - 5.1 mmol/L Chloride 107 97 - 108 mmol/L  
 CO2 24 21 - 32 mmol/L Anion gap 9 5 - 15 mmol/L Glucose 80 65 - 100 mg/dL BUN 33 (H) 6 - 20 MG/DL Creatinine 2.12 (H) 0.70 - 1.30 MG/DL  
 BUN/Creatinine ratio 16 12 - 20 GFR est AA 36 (L) >60 ml/min/1.73m2 GFR est non-AA 30 (L) >60 ml/min/1.73m2 Calcium 8.7 8.5 - 10.1 MG/DL Bilirubin, total 0.9 0.2 - 1.0 MG/DL  
 ALT (SGPT) 34 12 - 78 U/L  
 AST (SGOT) 28 15 - 37 U/L Alk. phosphatase 84 45 - 117 U/L Protein, total 7.5 6.4 - 8.2 g/dL Albumin 3.8 3.5 - 5.0 g/dL Globulin 3.7 2.0 - 4.0 g/dL A-G Ratio 1.0 (L) 1.1 - 2.2 EKG, 12 LEAD, INITIAL Collection Time: 01/28/19  9:57 PM  
Result Value Ref Range Ventricular Rate 65 BPM  
 Atrial Rate 65 BPM  
 P-R Interval 120 ms QRS Duration 132 ms Q-T Interval 472 ms QTC Calculation (Bezet) 490 ms Calculated P Axis 19 degrees Calculated R Axis -45 degrees Calculated T Axis 21 degrees Diagnosis Normal sinus rhythm Left axis deviation Right bundle branch block Inferior infarct (cited on or before 30-NOV-2018) When compared with ECG of 27-JAN-2019 17:04, No significant change was found

## 2019-01-29 NOTE — PROGRESS NOTES
Consult dictated [id number]. Agree with lowering Bb. Treat hypothyroidism. May be able to continue the dementia drugs without concern of bradycardia. No pacer needed at this time. Signed By: Jose Luis Bolivar MD   
 January 29, 2019

## 2019-01-29 NOTE — PROGRESS NOTES
Physical Therapy Goals Initiated 1/29/2019 1. Patient will move from supine to sit and sit to supine , scoot up and down and roll side to side in bed with independence within 7 day(s). 2.  Patient will transfer from bed to chair and chair to bed with supervision/set-up using the least restrictive device within 7 day(s). 3.  Patient will perform sit to stand with supervision/set-up within 7 day(s). 4.  Patient will ambulate with supervision/set-up for 250 feet with the least restrictive device within 7 day(s). 5.  Patient will ascend/descend 13 stairs with 1 handrail(s) with supervision/set-up within 7 day(s). physical Therapy EVALUATION Patient: Natalee Rebollar (69 y.o. male) Date: 1/29/2019 Primary Diagnosis: Dementia Precautions: falls ASSESSMENT : 
Based on the objective data described below, the patient presents with generalized weakness and mildly impaired functional mobility, balance and activity tolerance. Patient also demonstrates mild confusion and decreased safety awareness which increases risk of falls and further impacts functional independence. Patient requiring supervision to [facility]  for overall mobility today. He was able to ambulate 100 feet using RW- gait is mildly unsteady but no overt LOB noted. Patient demonstrating orthostatic hypotension in which BP continued to trend lower with time in standing, however patient remained asymptomatic.- nursing notified Patient Vitals for the past 4 hrs: 
 Temp Pulse Resp BP SpO2  
01/29/19 1252  (!) 57  126/78 96 %- supine post activity 01/29/19 1251    97/65 standing post ambulation 01/29/19 1247    107/71   
01/29/19 1245    114/66 standing post marching 01/29/19 1244    113/69 standing 01/29/19 1240  75  124/90 95 %sitting 01/29/19 1235  69  (!) 131/105 100 %supine 01/29/19 1102 97.9 °F (36.6 °C) 74 18 132/85 92 % Janispatel Simental Patient lives at alone and reports complete independence with all mobility. He has a daughter that lives in area but has not been involved in patient's care (not aware of last two hospitalizations). She reports she would like patient to return to home with her following discharge. Recommend HHPT with 24 hour supervision vs SNF with transition to [facility] Patient will benefit from skilled intervention to address the above impairments. Patients rehabilitation potential is considered to be Fair Factors which may influence rehabilitation potential include:  
[]         None noted 
[x]         Mental ability/status []         Medical condition 
[x]         Home/family situation and support systems 
[x]         Safety awareness 
[]         Pain tolerance/management 
[]         Other: PLAN : 
Recommendations and Planned Interventions: 
[x]           Bed Mobility Training             []    Neuromuscular Re-Education 
[x]           Transfer Training                   []    Orthotic/Prosthetic Training 
[x]           Gait Training                         []    Modalities []           Therapeutic Exercises           []    Edema Management/Control 
[x]           Therapeutic Activities            [x]    Patient and Family Training/Education 
[]           Other (comment): Frequency/Duration: Patient will be followed by physical therapy  4 times a week to address goals. Discharge Recommendations: Home Health with 24 hour supervision vs SNF with transition to [facility] Further Equipment Recommendations for Discharge: has appropriate equipment SUBJECTIVE:  
Patient stated I'm okay. Sometimes I have these freezing spells but I have never fallen.  OBJECTIVE DATA SUMMARY:  
HISTORY:   
Past Medical History:  
Diagnosis Date  Abdominal aortic aneurysm ([address]  Arthritis  CAD (coronary artery disease) bypass  Hypertension  Other ill-defined conditions(799.89)   
 gout  Other ill-defined conditions(799.89)   
 high cholesterol Past Surgical History: Procedure Laterality Date  ABDOMEN SURGERY PROC UNLISTED    
 hernia  CARDIAC SURG PROCEDURE UNLIST    
 bypass surgery  HX CATARACT REMOVAL    
 HX TONSILLECTOMY Prior Level of Function/Home Situation: patient reports living alone in 2nd story apartment; friends visit frequently. Patient reports he does not use any AD except occasionally uses cane in community; no longer driving. Personal factors and/or comorbidities impacting plan of care: dementia Home Situation Home Environment: Apartment # Steps to Enter: 13 
Rails to Enter: Yes Hand Rails : Bilateral 
One/Two Story Residence: One story Living Alone: Yes Current DME Used/Available at Home: Shower chair, Grab bars, Pedrito Danis, straight, Walker, rolling Tub or Shower Type: Shower EXAMINATION/PRESENTATION/DECISION MAKING: Critical Behavior: 
Neurologic State: Alert Orientation Level: Disoriented to time, Disoriented to situation, Disoriented to place Cognition: Memory loss, Follows commands Hearing: Auditory Auditory Impairment: None Range Of Motion: 
AROM: Generally decreased, functional 
  
  
  
  
  
  
  
Strength:   
Strength: Generally decreased, functional 
  
  
  
  
  
  
Tone & Sensation:  
Tone: Normal 
  
  
  
  
  
  
  
  
   
Coordination: 
Coordination: Generally decreased, functional 
Functional Mobility: 
Bed Mobility: 
  
Supine to Sit: Supervision Sit to Supine: Supervision Scooting: Supervision Transfers: 
Sit to Stand: Contact guard assistance Stand to Sit: Contact guard assistance Balance:  
Sitting: Intact Standing: Impaired Standing - Static: Good;Constant support Standing - Dynamic : Fair(using RW for support) Ambulation/Gait Training: 
Distance (ft): 100 Feet (ft) Assistive Device: Walker, rolling;Gait belt Ambulation - Level of Assistance: Contact guard assistance Gait Abnormalities: Decreased step clearance(mild forward flexion over RW) Base of Support: Widened Speed/Dralene: Pace decreased (<100 feet/min); Slow Step Length: Left shortened;Right shortened Gait is slow and slightly unsteady; requiring occasional VC for safety Functional Measure: 
 
Elder Mobility Scale 8/20 Scores under 10  generally these patients are dependent in mobility maneuvers; require help with 
basic ADL, such as transfers, toileting and dressing. Scores between 10  13  generally these patients are borderline in terms of safe mobility and 
independence in ADL i.e. they require some help with some mobility maneuvers. Scores over 14  Generally these patients are able to perform mobility maneuvers alone and safely 
and are independent in basic ADL. Pain: 
Pain Scale 1: Numeric (0 - 10) Pain Intensity 1: 0 Activity Tolerance:  
Vitals:  
 01/29/19 1245 01/29/19 1247 01/29/19 1251 01/29/19 1252 BP: 114/66 107/71 97/65 126/78 BP 1 Location: Right arm Right arm Right arm Right arm BP Patient Position: Standing Comment: post marching in place Standing Comment: post ambulating 10 feet in room Standing;Post activity Comment: ambulating 100 feet Sitting;Post activity Pulse:    (!) 57 Resp:      
Temp:      
SpO2:    96% Weight:      
Height:      
 
Please refer to the flowsheet for vital signs taken during this treatment. After treatment:  
[]         Patient left in no apparent distress sitting up in chair 
[x]         Patient left in no apparent distress in bed 
[x]         Call bell left within reach [x]         Nursing notified 
[]         Caregiver present 
[]         Bed alarm activated COMMUNICATION/EDUCATION:  
The patients plan of care was discussed with: Registered Nurse. [x]         Fall prevention education was provided and the patient/caregiver indicated understanding. [x]         Patient/family have participated as able in goal setting and plan of care. [x]         Patient/family agree to work toward stated goals and plan of care. []         Patient understands intent and goals of therapy, but is neutral about his/her participation. []         Patient is unable to participate in goal setting and plan of care. Thank you for this referral. 
Shital Capellan, PT Time Calculation: 37 mins

## 2019-01-29 NOTE — ED NOTES
Patient's daughter José Miguel Postal called to check on patient. States to call her with updates. [phone].

## 2019-01-29 NOTE — PROGRESS NOTES
TRANSFER - OUT REPORT: 
 
Verbal report given to Christine rn (name) on Tucker August  being transferred to [facility] (room 21 ) for routine progression of care Report consisted of patients Situation, Background, Assessment and  
Recommendations(SBAR). Information from the following report(s) SBAR, Kardex, ED Summary, Intake/Output, MAR, Recent Results and Cardiac Rhythm NSR was reviewed with the receiving nurse. Lines:  
Peripheral IV 01/29/19 Distal;Left Antecubital (Active) Site Assessment Clean, dry, & intact 1/29/2019 10:41 AM  
Phlebitis Assessment 0 1/29/2019 10:41 AM  
Infiltration Assessment 0 1/29/2019 10:41 AM  
Dressing Status New 1/29/2019 10:41 AM  
Dressing Type Transparent;Tape 1/29/2019 10:41 AM  
Hub Color/Line Status Blue;Capped;Flushed 1/29/2019 10:41 AM  
  
 
Opportunity for questions and clarification was provided. Patient transported with: 
 Monitor Tech

## 2019-01-29 NOTE — PROGRESS NOTES
TRANSFER - IN REPORT: 
 
Verbal report received from [place], RN(name) on Bereket Delacruz  being received from ED(unit) for routine progression of care Report consisted of patients Situation, Background, Assessment and  
Recommendations(SBAR). Information from the following report(s) SBAR, Kardex, Intake/Output, MAR and Recent Results was reviewed with the receiving nurse. Opportunity for questions and clarification was provided. Assessment completed upon patients arrival to unit and care assumed.

## 2019-01-29 NOTE — ED PROVIDER NOTES
EMERGENCY DEPARTMENT HISTORY AND PHYSICAL EXAM 
 
 
Date: 1/28/2019 Patient Name: Vikas Granados History of Presenting Illness Chief Complaint Patient presents with  Weight Loss  
  pt arrives by EMS with reports of failure to thrive, lives alone, was seen in ED yesterday for dementia, EMS spoke with pts daughter PROVIDER IN TRIAGE NOTE: 
8:49 PM 
Jamie Castillo PA-C has evaluated the patient as the Provider in Triage (PIT) / provider in [facility] for dementia, confusion and concern about his welfare. The vital signs and the triage nurse assessment have been reviewed. The patient and any available family have been advised that the appropriate studies have been ordered to initiate the work up based on the clinical presentation during the assessment. The pt has been advised that they will be accommodated in Emergency Department as soon as possible. The pt has been requested to contact the triage nurse or PIT/J[facility] immediately if they experiences any changes in their condition during this brief waiting period. History Provided By: Patient and EMS 
 
HPI: Vikas Granados, 80 y.o. male with PMHx significant for CAD, HTN, gout, HLD, arthritis, AAA, presents by EMS to the ED with reports of AMS that started earlier today. EMS report this patient was found to be in a deep sleep by daughter and was difficult to arouse. EMS reports the house was in disarray and that this patient lives alone. EMS states the daughter-in-law wants a more permanent solution to his mental status changes. EMR shows this patient was seen in the ED yesterday and was given a head CT that was normal and was sent home. Per chart review, pt was seen on 12/18 and admitted for AMS and was found to have a UTI and was diagnosed with dementia. When this patient is asked about his medicine compliance, he states \"sometimes I miss one, but most of the time I take them\". History is limited due to AMS and patient's dementia. PCP: Olga Lidia Carbajal MD 
 
There are no other complaints, changes or physical findings at this time. Past History Past Medical History: 
Past Medical History:  
Diagnosis Date  Abdominal aortic aneurysm ([address]  Arthritis  CAD (coronary artery disease) bypass  Hypertension  Other ill-defined conditions(799.89)   
 gout  Other ill-defined conditions(799.89)   
 high cholesterol Past Surgical History: 
Past Surgical History:  
Procedure Laterality Date  ABDOMEN SURGERY PROC UNLISTED    
 hernia  CARDIAC SURG PROCEDURE UNLIST    
 bypass surgery  HX CATARACT REMOVAL    
 HX TONSILLECTOMY Family History: 
Family History Problem Relation Age of Onset  Cancer Mother   
     liver  Hypertension Father  No Known Problems Sister  No Known Problems Brother  No Known Problems Sister   
     brain aneurysym Social History: 
Social History Tobacco Use  Smoking status: Former Smoker  Smokeless tobacco: Never Used Substance Use Topics  Alcohol use: No  
 Drug use: No  
 
 
Allergies: 
No Known Allergies Medications: No current facility-administered medications on file prior to encounter. Current Outpatient Medications on File Prior to Encounter Medication Sig Dispense Refill  donepezil (ARICEPT) 5 mg tablet Take 1 Tab by mouth nightly. 30 Tab 3  
 metoprolol succinate (TOPROL-XL) 50 mg XL tablet TAKE 1 TABLET BY MOUTH DAILY. 90 Tab 1  
 tamsulosin (FLOMAX) 0.4 mg capsule TAKE 1 CAPSULE BY MOUTH EVERY DAY 90 Cap 0  
 allopurinol (ZYLOPRIM) 100 mg tablet Take 1 Tab by mouth daily. 90 Tab 0  
 memantine (NAMENDA) 10 mg tablet Take 1 Tab by mouth two (2) times a day. 10mg Twice daily 180 Tab 1  
 QUEtiapine (SEROQUEL) 25 mg tablet Take 0.5 Tabs by mouth nightly. 45 Tab 1  
 furosemide (LASIX) 20 mg tablet TAKE 1 TABLET BY MOUTH EVERY OTHER DAY 45 Tab 3 Review of Systems Review of Systems Unable to perform ROS: Mental status change Physical Exam  
Physical Exam  
Constitutional: Vital signs are normal. He is cooperative. He appears ill. HENT:  
Head: Normocephalic and atraumatic. Mouth/Throat: Mucous membranes are normal. No posterior oropharyngeal erythema. Eyes: Conjunctivae and EOM are normal. Pupils are equal, round, and reactive to light. Neck: Normal range of motion. Cardiovascular: Regular rhythm, normal heart sounds and intact distal pulses. Exam reveals no gallop and no friction rub. No murmur heard. Sinus bradycardia Pulmonary/Chest: Effort normal and breath sounds normal. No respiratory distress. He has no wheezes. He has no rales. He exhibits no tenderness. Abdominal: Soft. Bowel sounds are normal. He exhibits no distension and no mass. There is no tenderness. There is no rebound and no guarding. Musculoskeletal: Normal range of motion. He exhibits no edema, tenderness or deformity. Neurological: He is alert. He displays normal reflexes. No cranial nerve deficit. He exhibits normal muscle tone. Coordination normal.  
Pt is non coherent. Pt is having comprehensible speech, but is speaking about random thoughts. Skin: Skin is warm. No rash noted. Psychiatric: He has a normal mood and affect. Nursing note and vitals reviewed. Diagnostic Study Results Labs - Recent Results (from the past 24 hour(s)) CBC WITH AUTOMATED DIFF Collection Time: 01/28/19  5:08 PM  
Result Value Ref Range WBC 6.6 4.1 - 11.1 K/uL  
 RBC 3.56 (L) 4.10 - 5.70 M/uL  
 HGB 12.4 12.1 - 17.0 g/dL HCT 37.3 36.6 - 50.3 % .8 (H) 80.0 - 99.0 FL  
 MCH 34.8 (H) 26.0 - 34.0 PG  
 MCHC 33.2 30.0 - 36.5 g/dL  
 RDW 14.0 11.5 - 14.5 % PLATELET 025 366 - 274 K/uL MPV 10.0 8.9 - 12.9 FL  
 NRBC 0.0 0  WBC ABSOLUTE NRBC 0.00 0.00 - 0.01 K/uL NEUTROPHILS 66 32 - 75 % LYMPHOCYTES 20 12 - 49 % MONOCYTES 12 5 - 13 % EOSINOPHILS 2 0 - 7 % BASOPHILS 1 0 - 1 % IMMATURE GRANULOCYTES 0 0.0 - 0.5 % ABS. NEUTROPHILS 4.3 1.8 - 8.0 K/UL  
 ABS. LYMPHOCYTES 1.3 0.8 - 3.5 K/UL  
 ABS. MONOCYTES 0.8 0.0 - 1.0 K/UL  
 ABS. EOSINOPHILS 0.1 0.0 - 0.4 K/UL  
 ABS. BASOPHILS 0.0 0.0 - 0.1 K/UL  
 ABS. IMM. GRANS. 0.0 0.00 - 0.04 K/UL  
 DF AUTOMATED METABOLIC PANEL, COMPREHENSIVE Collection Time: 01/28/19  5:08 PM  
Result Value Ref Range Sodium 140 136 - 145 mmol/L Potassium 4.0 3.5 - 5.1 mmol/L Chloride 107 97 - 108 mmol/L  
 CO2 24 21 - 32 mmol/L Anion gap 9 5 - 15 mmol/L Glucose 80 65 - 100 mg/dL BUN 33 (H) 6 - 20 MG/DL Creatinine 2.12 (H) 0.70 - 1.30 MG/DL  
 BUN/Creatinine ratio 16 12 - 20 GFR est AA 36 (L) >60 ml/min/1.73m2 GFR est non-AA 30 (L) >60 ml/min/1.73m2 Calcium 8.7 8.5 - 10.1 MG/DL Bilirubin, total 0.9 0.2 - 1.0 MG/DL  
 ALT (SGPT) 34 12 - 78 U/L  
 AST (SGOT) 28 15 - 37 U/L Alk. phosphatase 84 45 - 117 U/L Protein, total 7.5 6.4 - 8.2 g/dL Albumin 3.8 3.5 - 5.0 g/dL Globulin 3.7 2.0 - 4.0 g/dL A-G Ratio 1.0 (L) 1.1 - 2.2 EKG, 12 LEAD, INITIAL Collection Time: 01/28/19  9:57 PM  
Result Value Ref Range Ventricular Rate 65 BPM  
 Atrial Rate 65 BPM  
 P-R Interval 120 ms QRS Duration 132 ms Q-T Interval 472 ms QTC Calculation (Bezet) 490 ms Calculated P Axis 19 degrees Calculated R Axis -45 degrees Calculated T Axis 21 degrees Diagnosis Normal sinus rhythm Left axis deviation Right bundle branch block Inferior infarct (cited on or before 30-NOV-2018) When compared with ECG of 27-JAN-2019 17:04, No significant change was found URINALYSIS W/ REFLEX CULTURE Collection Time: 01/29/19  3:10 AM  
Result Value Ref Range Color YELLOW/STRAW Appearance CLEAR CLEAR Specific gravity 1.018 1.003 - 1.030    
 pH (UA) 6.0 5.0 - 8.0 Protein NEGATIVE  NEG mg/dL Glucose NEGATIVE  NEG mg/dL Ketone 15 (A) NEG mg/dL Bilirubin NEGATIVE  NEG Blood NEGATIVE  NEG Urobilinogen 0.2 0.2 - 1.0 EU/dL Nitrites NEGATIVE  NEG Leukocyte Esterase NEGATIVE  NEG    
 WBC 0-4 0 - 4 /hpf  
 RBC 0-5 0 - 5 /hpf Epithelial cells FEW FEW /lpf Bacteria NEGATIVE  NEG /hpf  
 UA:UC IF INDICATED CULTURE NOT INDICATED BY UA RESULT CNI Hyaline cast 0-2 0 - 5 /lpf  
TSH 3RD GENERATION Collection Time: 01/29/19  3:52 AM  
Result Value Ref Range TSH 91.50 (H) 0.36 - 3.74 uIU/mL Radiologic Studies -  
XR CHEST PORT Final Result IMPRESSION: Large hiatal hernia. Grossly clear lungs. CT Results  (Last 48 hours) 01/27/19 1828  CT HEAD WO CONT Final result Impression:  IMPRESSION: No acute intracranial hemorrhage, mass or infarct. Stable pattern of  
atrophy and chronic white matter change most compatible with small vessel  
ischemic and/or senescent change. Intracranial atherosclerosis. Narrative:  EXAM: Brain CT without contrast.  
   
INDICATION: Confusion/delirium, altered LOC, unexplained TECHNIQUE: Noncontrast CT of the brain is performed with 5 mm collimation. Bone  
algorithm axial and sagittal and coronal reconstructions performed and  
evaluated. CT dose reduction was achieved through use of a standardized  
protocol tailored for this examination and automatic exposure control for dose  
modulation. COMPARISON: CT brain 11/30/2018 and MRI 12/1/2018. FINDINGS: There is no acute intracranial hemorrhage, mass, mass effect or  
herniation. Ventricles and sulci show no significant change in proportionate and  
symmetric prominence. There is no significant change in pattern of  
periventricular white matter hypodensity. The gray-white matter differentiation  
is well-preserved. Atherosclerotic calcifications are seen within the carotid  
siphons and vertebral arteries. The mastoid air cells are well pneumatized.  The  
visualized paranasal sinuses are normal.  
 CXR Results  (Last 48 hours) 01/28/19 2205  XR CHEST PORT Final result Impression:  IMPRESSION: Large hiatal hernia. Grossly clear lungs. Narrative:  INDICATION: Weakness. Failure to thrive. Portable AP upright view of the chest.  
   
Direct comparison made to prior chest x-ray dated November 2018. There is a large hiatal hernia, unchanged. The cardiomediastinal silhouette is  
stable. Median sternotomy wires are noted. Lungs are clear bilaterally. No  
pleural fluid is visualized on single AP view. There is no pneumothorax. Medical Decision Making I am the first provider for this patient. I reviewed the vital signs, available nursing notes, past medical history, past surgical history, family history and social history. Vital Signs-Reviewed the patient's vital signs. Patient Vitals for the past 24 hrs: 
 Temp Pulse Resp BP SpO2  
01/29/19 0732 97.7 °F (36.5 °C) 80 10 115/62 99 % 01/29/19 0601  64 18 118/79 95 % 01/29/19 0107  (!) 47 18 129/80 97 % 01/29/19 0100  (!) 49 24  94 % 01/28/19 1522 97.6 °F (36.4 °C) (!) 52 16 130/85 100 % Pulse Oximetry Analysis - 97% on RA Cardiac Monitor:  
Rate: 47 bpm 
Rhythm: Sinus bradycardia ED EKG interpretation: 2157 Rhythm: normal sinus rhythm; and regular . Rate (approx.): 65; Axis: normal; P wave: normal; QRS interval: normal ; ST/T wave: normal; Other findings: LAD, RBBB, inferior infarct, age undetermined. This EKG was interpreted by Leanna Agarwal M.D. Records Reviewed: Nursing Notes, Old Medical Records, Previous electrocardiograms, Previous Radiology Studies and Previous Laboratory Studies Provider Notes (Medical Decision Making):  
Patient presenting via EMS with reports about family's concerns about patient's ability to care for himself; recent ED visit with negative workup and CT imaging;  Pt has stable vitals and POC glucose was checked immediately upon arrival. DDx: medication toxicity, infection, anemia, electrolyte/metabolic anomoly, hypercapnea, stroke/bleed/mass, dehydration, illicit drug intoxication. Will obtain labwork, UA, EKG and chest xray. Will continue to monitor and reassess for admission. Will discuss with daughter about plan of care. ED Course:  
Initial assessment performed. The patients presenting problems have been discussed, and they are in agreement with the care plan formulated and outlined with them. I have encouraged them to ask questions as they arise throughout their visit. HYPERTENSION COUNSELING Education was provided to the patient today regarding their hypertension. Patient is made aware of their elevated blood pressure and is instructed to follow up this week with their Primary Care for a recheck. Patient is counseled regarding consequences of chronic, uncontrolled hypertension including kidney disease, heart disease, stroke or even death. Patient states their understanding and agrees to follow up this week. Additionally, during their visit, I discussed sodium restriction, maintaining ideal body weight and regular exercise program as physiologic means to achieve blood pressure control. The patient will strive towards this. I reviewed our electronic medical record system for any past medical records that were available that may contribute to the patient's current condition, the nursing notes and vital signs from today's visit. Bola Baird MD 
Medications Administered During ED Course: 
Medications  
allopurinol (ZYLOPRIM) tablet 100 mg (not administered) amLODIPine (NORVASC) tablet 5 mg (not administered) donepezil (ARICEPT) tablet 5 mg (5 mg Oral Given 1/29/19 0537) furosemide (LASIX) tablet 20 mg (not administered) memantine (NAMENDA) tablet 10 mg (not administered)  
metoprolol succinate (TOPROL-XL) XL tablet 25 mg (not administered)  
tamsulosin (FLOMAX) capsule 0.4 mg (not administered) sodium chloride (NS) flush 5-40 mL (not administered)  
sodium chloride (NS) flush 5-40 mL (not administered)  
acetaminophen (TYLENOL) tablet 650 mg (not administered)  
ondansetron (ZOFRAN) injection 4 mg (not administered)  
heparin (porcine) injection 5,000 Units (5,000 Units SubCUTAneous Given 1/29/19 0522) pantoprazole (PROTONIX) tablet 40 mg (not administered) Progress Note: 
12:02 AM 
Updated the patient on results and plans for admission. Pt and family are agreeable with plan. Consult Note: 
12:34 Marcella Seo MD spoke with Dr. Jermaine Silva, Specialty: Hospitalist 
Discussed pt's hx, disposition, and available diagnostic and imaging results. Reviewed care plans. Consultant agrees with plans as outlined. Dr. Jermaine Silva will admit this patient. Critical Care Time: CRITICAL CARE NOTE : 
 
2:10 AM 
 
IMPENDING DETERIORATION -CNS, Metabolic and Renal 
ASSOCIATED RISK FACTORS - Hypotension, Shock, Hypoxia, Dysrhythmia, Metabolic changes, Dehydration and CNS Decompensation MANAGEMENT- Bedside Assessment and Supervision of Care INTERPRETATION -  Xrays, CT Scan, Blood Gases, ECG, Blood Pressure and Cardiac Output Measures INTERVENTIONS - hemodynamic mngmt, Neurologic interventions  and Metobolic interventions CASE REVIEW - Hospitalist, Nursing and Family TREATMENT RESPONSE -Improved PERFORMED BY - Self NOTES   : 
 
I have spent 50 minutes of critical care time involved in lab review, consultations with specialist, family decision- making, bedside attention and documentation. During this entire length of time I was immediately available to the patient . Critical Care: The reason for providing this level of medical care for this critically ill patient was due to a critical illness that impaired one or more vital organ systems, such that there was a high probability of imminent or life threatening deterioration in the patient's condition. This care involved high complexity decision making to assess, manipulate, and support vital system functions, to treat this degree of vital organ system failure, and to prevent further life threatening deterioration of the patients condition. Rodrigue Martinez MD 
 
 
Disposition: 
Admit Note: 
12:34 AM 
Pt is being admitted by Dr. Alex Mckeon. The results of their tests and reason(s) for their admission have been discussed with pt and/or available family. They convey agreement and understanding for the need to be admitted and for admission diagnosis. PLAN: 
1. Admit to the hospitalist. 
 
Diagnosis Clinical Impression: 1. Failure to thrive in adult 2. Dementia without behavioral disturbance, unspecified dementia type 3. Unsteady gait 4. Hiatal hernia 5. Acute renal failure superimposed on stage 3 chronic kidney disease, unspecified acute renal failure type ([facility] [address] Attestations This note is prepared by Jose Manuel Quevedo, acting as Scribe for MD Rodrigue Sotomayor MD : The scribe's documentation has been prepared under my direction and personally reviewed by me in its entirety. I confirm that the note above accurately reflects all work, treatment, procedures, and medical decision making performed by me. 
 
: 
This note will not be viewable in [address] [place]

## 2019-01-30 PROBLEM — R00.1 BRADYCARDIA: Status: ACTIVE | Noted: 2019-01-30

## 2019-01-30 LAB
ALBUMIN SERPL-MCNC: 3.1 G/DL (ref 3.5–5)
ALBUMIN/GLOB SERPL: 0.9 {RATIO} (ref 1.1–2.2)
ALP SERPL-CCNC: 71 U/L (ref 45–117)
ALT SERPL-CCNC: 26 U/L (ref 12–78)
ANION GAP SERPL CALC-SCNC: 9 MMOL/L (ref 5–15)
AST SERPL-CCNC: 24 U/L (ref 15–37)
BASOPHILS # BLD: 0 K/UL (ref 0–0.1)
BASOPHILS NFR BLD: 1 % (ref 0–1)
BILIRUB SERPL-MCNC: 0.5 MG/DL (ref 0.2–1)
BUN SERPL-MCNC: 32 MG/DL (ref 6–20)
BUN/CREAT SERPL: 17 (ref 12–20)
CALCIUM SERPL-MCNC: 8.1 MG/DL (ref 8.5–10.1)
CHLORIDE SERPL-SCNC: 107 MMOL/L (ref 97–108)
CO2 SERPL-SCNC: 24 MMOL/L (ref 21–32)
CREAT SERPL-MCNC: 1.9 MG/DL (ref 0.7–1.3)
DIFFERENTIAL METHOD BLD: ABNORMAL
EOSINOPHIL # BLD: 0.2 K/UL (ref 0–0.4)
EOSINOPHIL NFR BLD: 3 % (ref 0–7)
ERYTHROCYTE [DISTWIDTH] IN BLOOD BY AUTOMATED COUNT: 13.7 % (ref 11.5–14.5)
GLOBULIN SER CALC-MCNC: 3.4 G/DL (ref 2–4)
GLUCOSE SERPL-MCNC: 85 MG/DL (ref 65–100)
HCT VFR BLD AUTO: 34 % (ref 36.6–50.3)
HGB BLD-MCNC: 11.3 G/DL (ref 12.1–17)
IMM GRANULOCYTES # BLD AUTO: 0 K/UL (ref 0–0.04)
IMM GRANULOCYTES NFR BLD AUTO: 0 % (ref 0–0.5)
LYMPHOCYTES # BLD: 1.2 K/UL (ref 0.8–3.5)
LYMPHOCYTES NFR BLD: 21 % (ref 12–49)
MCH RBC QN AUTO: 34.6 PG (ref 26–34)
MCHC RBC AUTO-ENTMCNC: 33.2 G/DL (ref 30–36.5)
MCV RBC AUTO: 104 FL (ref 80–99)
MONOCYTES # BLD: 0.8 K/UL (ref 0–1)
MONOCYTES NFR BLD: 14 % (ref 5–13)
NEUTS SEG # BLD: 3.6 K/UL (ref 1.8–8)
NEUTS SEG NFR BLD: 61 % (ref 32–75)
NRBC # BLD: 0 K/UL (ref 0–0.01)
NRBC BLD-RTO: 0 PER 100 WBC
PLATELET # BLD AUTO: 174 K/UL (ref 150–400)
PMV BLD AUTO: 10.2 FL (ref 8.9–12.9)
POTASSIUM SERPL-SCNC: 3.5 MMOL/L (ref 3.5–5.1)
PROT SERPL-MCNC: 6.5 G/DL (ref 6.4–8.2)
RBC # BLD AUTO: 3.27 M/UL (ref 4.1–5.7)
SODIUM SERPL-SCNC: 140 MMOL/L (ref 136–145)
WBC # BLD AUTO: 5.9 K/UL (ref 4.1–11.1)

## 2019-01-30 PROCEDURE — 65660000000 HC RM CCU STEPDOWN

## 2019-01-30 PROCEDURE — 77030011256 HC DRSG MEPILEX <16IN NO BORD MOLN -A

## 2019-01-30 PROCEDURE — 74011250636 HC RX REV CODE- 250/636: Performed by: INTERNAL MEDICINE

## 2019-01-30 PROCEDURE — 74011250637 HC RX REV CODE- 250/637: Performed by: INTERNAL MEDICINE

## 2019-01-30 PROCEDURE — 99218 HC RM OBSERVATION: CPT

## 2019-01-30 PROCEDURE — 74011250637 HC RX REV CODE- 250/637: Performed by: HOSPITALIST

## 2019-01-30 PROCEDURE — 36415 COLL VENOUS BLD VENIPUNCTURE: CPT

## 2019-01-30 PROCEDURE — 51798 US URINE CAPACITY MEASURE: CPT

## 2019-01-30 PROCEDURE — 80053 COMPREHEN METABOLIC PANEL: CPT

## 2019-01-30 PROCEDURE — 97116 GAIT TRAINING THERAPY: CPT | Performed by: PHYSICAL THERAPIST

## 2019-01-30 PROCEDURE — 85025 COMPLETE CBC W/AUTO DIFF WBC: CPT

## 2019-01-30 RX ORDER — LORAZEPAM 2 MG/ML
INJECTION INTRAMUSCULAR
Status: DISPENSED
Start: 2019-01-30 | End: 2019-01-30

## 2019-01-30 RX ADMIN — ALLOPURINOL 100 MG: 100 TABLET ORAL at 09:26

## 2019-01-30 RX ADMIN — Medication 10 ML: at 06:05

## 2019-01-30 RX ADMIN — LEVOTHYROXINE SODIUM 25 MCG: 50 TABLET ORAL at 06:03

## 2019-01-30 RX ADMIN — TAMSULOSIN HYDROCHLORIDE 0.4 MG: 0.4 CAPSULE ORAL at 09:25

## 2019-01-30 RX ADMIN — HEPARIN SODIUM 5000 UNITS: 5000 INJECTION INTRAVENOUS; SUBCUTANEOUS at 06:03

## 2019-01-30 RX ADMIN — FUROSEMIDE 20 MG: 40 TABLET ORAL at 09:26

## 2019-01-30 RX ADMIN — HEPARIN SODIUM 5000 UNITS: 5000 INJECTION INTRAVENOUS; SUBCUTANEOUS at 17:13

## 2019-01-30 RX ADMIN — HEPARIN SODIUM 5000 UNITS: 5000 INJECTION INTRAVENOUS; SUBCUTANEOUS at 21:28

## 2019-01-30 RX ADMIN — Medication 10 ML: at 21:28

## 2019-01-30 RX ADMIN — Medication 10 ML: at 17:11

## 2019-01-30 RX ADMIN — MEMANTINE 10 MG: 10 TABLET ORAL at 17:11

## 2019-01-30 RX ADMIN — PANTOPRAZOLE SODIUM 40 MG: 40 TABLET, DELAYED RELEASE ORAL at 09:25

## 2019-01-30 RX ADMIN — MEMANTINE 10 MG: 10 TABLET ORAL at 09:25

## 2019-01-30 NOTE — ROUTINE PROCESS
Bedside and Verbal shift change report given to [facility], RN (oncoming nurse) by Juan Carlos Dorado RN (offgoing nurse). Report included the following information SBAR, Kardex, Intake/Output and MAR. Zone Phone:   [phone] Significant changes during shift:  None Patient Information Yanely Pacheco 
80 y.o. 
1/28/2019  8:15 PM by Nellie Jimenez MD. Yanely Pacheco was admitted from Home 
 
Problem List 
 
Patient Active Problem List  
 Diagnosis Date Noted  Dementia 01/29/2019  Cerebral microvascular disease 01/29/2019  Bilateral carotid artery stenosis 01/29/2019  Change in mental status 11/30/2018  Inability to walk 11/22/2017  Coronary artery disease involving native coronary artery without angina pectoris 05/13/2016  S/P CABG (coronary artery bypass graft) 05/13/2016  
 Hiatal hernia with gastroesophageal reflux 05/13/2016  H/O endovascular stent graft for abdominal aortic aneurysm 05/13/2016  CKD (chronic kidney disease) stage 3, GFR 30-59 ml/min ([facility]) 05/13/2016  AAA (abdominal aortic aneurysm) ([facility] [address] 11/07/2014 Past Medical History:  
Diagnosis Date  Abdominal aortic aneurysm ([facility] [address]  Arthritis  CAD (coronary artery disease) bypass  Hypertension  Other ill-defined conditions(799.89)   
 gout  Other ill-defined conditions(799.89)   
 high cholesterol Core Measures: CVA: No No 
CHF:No No 
PNA:No No 
 
 
Activity Status: OOB to Chair No 
Ambulated this shift No  
Bed Rest No 
 
 
DVT prophylaxis: DVT prophylaxis Med- yes DVT prophylaxis SCD or ALMAS- No  
 
Wounds: (If Applicable) Wounds- No 
 
Location Patient Safety: 
 
Falls Score Total Score: 4 Safety Level_______ Bed Alarm On? Yes Sitter?  No 
 
Plan for upcoming shift: safety, orthostatic B/P as ordered,  echo in AM Discharge Plan: No TBD Active Consults: 
IP CONSULT TO CARDIOLOGY

## 2019-01-30 NOTE — PROGRESS NOTES
Problem: Mobility Impaired (Adult and Pediatric) Goal: *Acute Goals and Plan of Care (Insert Text) Physical Therapy Goals Initiated 1/29/2019 1. Patient will move from supine to sit and sit to supine , scoot up and down and roll side to side in bed with independence within 7 day(s). 2.  Patient will transfer from bed to chair and chair to bed with supervision/set-up using the least restrictive device within 7 day(s). 3.  Patient will perform sit to stand with supervision/set-up within 7 day(s). 4.  Patient will ambulate with supervision/set-up for 250 feet with the least restrictive device within 7 day(s). 5.  Patient will ascend/descend 13 stairs with 1 handrail(s) with supervision/set-up within 7 day(s). physical Therapy TREATMENT Patient: Emily Candelaria (35 y.o. male) Date: 1/30/2019 Diagnosis: Dementia <principal problem not specified> Precautions:   
Chart, physical therapy assessment, plan of care and goals were reviewed. ASSESSMENT: 
Patient supine in bed and willing to participate after being cleared by nursing. He comes to sit independently and is able to jojo socks/shoes from edge of bed without assist. He is supervision for safety with sit to/from stand and with gait 350 feet with rolling walker, narrow base of support, fluctuating speed, and one disturbance of balance to the left requiring contact guard assist to correct. After seated rest, patient is able to ambulate >/= 200 feet in hallway with gait belt and contact guard assist for safety/balance, more path disturbances than with rolling walker but overall WFL. Depending on how family is able to assist, patient will benefit from either 24/7 supervision with or without HHPT, or SNF Rehab to transition into assisted. Will address stairs at next PT treatment, if possible. Progression toward goals: 
[x]    Improving appropriately and progressing toward goals 
[]    Improving slowly and progressing toward goals []    Not making progress toward goals and plan of care will be adjusted PLAN: 
Patient continues to benefit from skilled intervention to address the above impairments. Continue treatment per established plan of care. Discharge Recommendations: 24/7 supervision with or without  [facility] or [facility] Equipment Recommendations for Discharge:  Patient states that he has 2 rolling walkers SUBJECTIVE:  
Patient stated That's some of my trouble, I have trouble remembering things.  He is unable to state name of his apartment complex. OBJECTIVE DATA SUMMARY:  
Critical Behavior: 
Neurologic State: Alert Orientation Level: Oriented to person, Oriented to place, Disoriented to situation, Disoriented to time Cognition: Decreased command following, Follows commands Functional Mobility Training: 
Bed Mobility: 
  
Supine to Sit: Independent Sit to Supine: Independent Transfers: 
Sit to Stand: Supervision Stand to Sit: Supervision Balance: 
Sitting: Intact Standing: Impaired; Without support Standing - Static: Good Standing - Dynamic : FairAmbulation/Gait Training: 
Distance (ft): 550 Feet (ft)(350 with rolling walker, 200 without device, seated rest between trials) Assistive Device: Gait belt;Walker, rolling(just gait belt for second gait trial) Ambulation - Level of Assistance: Contact guard assistance;Supervision(supervision with rolling walker, contct guard without device) Gait Abnormalities: Decreased step clearance; Path deviations(tends to veer left without device > with walker) Base of Support: Narrowed Speed/Darlene: Fluctuations Step Length: Left shortened;Right shortened Stairs: to be addressed within next 1-2 treatments Neuro Re-Education: 
Static standing without support for > 1 minute with supervision only. Pain: 
Pain Scale 1: Numeric (0 - 10) Pain Intensity 1: 0 Activity Tolerance: Excellent, no shortness of breath with gait trials and he is able to talk during ambulation. No complaints of dizziness in standing, before or after gait. Please refer to the flowsheet for vital signs taken during this treatment. After treatment:  
[]    Patient left in no apparent distress sitting up in chair 
[x]    Patient left in no apparent distress in bed 
[x]    Call bell left within reach [x]    Nursing notified 
[]    Caregiver present [x]    Bed alarm activated COMMUNICATION/COLLABORATION:  
The patients plan of care was discussed with: Occupational Therapist and Registered Nurse Kelsi Aguillon, PT Time Calculation: 22 mins

## 2019-01-30 NOTE — PROGRESS NOTES
Problem: Falls - Risk of 
Goal: *Absence of Falls Document Yeimi Fearing Fall Risk and appropriate interventions in the flowsheet. Outcome: Progressing Towards Goal 
Fall Risk Interventions: 
Mobility Interventions: Bed/chair exit alarm, Communicate number of staff needed for ambulation/transfer, Patient to call before getting OOB, PT Consult for mobility concerns Mentation Interventions: Adequate sleep, hydration, pain control, Door open when patient unattended Medication Interventions: Bed/chair exit alarm, Patient to call before getting OOB, Teach patient to arise slowly Elimination Interventions: Bed/chair exit alarm, Call light in reach, Patient to call for help with toileting needs, Toilet paper/wipes in reach, Toileting schedule/hourly rounds, Urinal in reach Problem: Patient Education: Go to Patient Education Activity Goal: Patient/Family Education Outcome: Not Progressing Towards Goal 
Patient is altered and no co-learner at this time. Problem: Patient Education: Go to Patient Education Activity Goal: Patient/Family Education Outcome: Not Progressing Towards Goal 
Due to altered mental status Problem: Altered Thought Process (Adult/Pediatric) Goal: *STG: Attends activities and groups Outcome: Not Progressing Towards Goal 
No groups, intervention not applicable. Problem: Patient Education: Go to Patient Education Activity Goal: Patient/Family Education Outcome: Not Progressing Towards Goal 
Patient with AMS and no co-learner present.

## 2019-01-30 NOTE — PROGRESS NOTES
Initial Nutrition Assessment: 
 
INTERVENTIONS/RECOMMENDATIONS:  
· Meals/Snacks: General/healthful diet: Continue Regular diet ASSESSMENT:  
Patient medically noted for dementia and hypothyroidism. Patient in bed at time of visit. Reports a good appetite currently and PTA. Ate the majority of his breakfast this morning. Explained menu and room service to patient. No recent weight loss; chart review shows ~10# weight gain over the past 2 months. Continue regular diet. Encourage intake of meals. Diet Order: Regular 
% Eaten:   
Patient Vitals for the past 72 hrs: 
 % Diet Eaten 01/29/19 1900 100 % Pertinent Medications: [x]Reviewed []Other: Aricept, Lasix, Synthroid, Namenda, Protonix Pertinent Labs: [x]Reviewed []Other:  
Food Allergies: [x]None []Other Last BM:    [x]Active     []Hyperactive  []Hypoactive       [] Absent BS Skin:    [x] Intact   [] Incision  [] Breakdown: [] Edema []Other: Anthropometrics:  
Height: 6' (182.9 cm) Weight: 86.2 kg (190 lb) IBW (%IBW):   ( ) UBW (%UBW):   (  %) Last Weight Metrics: 
Weight Loss Metrics 1/28/2019 1/27/2019 11/30/2018 4/10/2018 3/15/2018 1/30/2018 11/21/2017 Today's Wt 190 lb 193 lb 9 oz 179 lb 14.3 oz 180 lb 187 lb 200 lb 180 lb BMI 25.77 kg/m2 25.54 kg/m2 24.4 kg/m2 24.41 kg/m2 25.36 kg/m2 27.12 kg/m2 24.41 kg/m2 BMI: Body mass index is 25.77 kg/m². This BMI is indicative of: 
 []Underweight    []Normal    [x]Overweight    [] Obesity   [] Extreme Obesity (BMI>40) Estimated Nutrition Needs (Based on):  
2058 Kcals/day(BMR (1583) x 1. 3AF) , 86 g(1.0 g/kg bw) Protein Carbohydrate: At Least 130 g/day  Fluids: 2050 mL/day (1ml/kcal) Pt expected to meet estimated nutrient needs: [x]Yes []No 
 
NUTRITION DIAGNOSES:  
Problem:  No nutritional diagnosis at this time Etiology: related to Signs/Symptoms: as evidenced by NUTRITION INTERVENTIONS: 
Meals/Snacks: General/healthful diet GOAL:  
 PO intake >75% of meals next 5-7 days LEARNING NEEDS (Diet, Food/Nutrient-Drug Interaction):  
 [x] None Identified 
 [] Identified and Education Provided/Documented 
 [] Identified and Pt declined/was not appropriate Cultural, Gnosticist, OR Ethnic Dietary Needs:  
 [x] None Identified 
 [] Identified and Addressed 
 
 [x] Interdisciplinary Care Plan Reviewed/Documented  
 [x] Discharge Planning:  Regular diet MONITORING /EVALUATION:  
Food/Nutrient Intake Outcomes: Total energy intake Physical Signs/Symptoms Outcomes: Weight/weight change NUTRITION RISK:  
 [] High              [] Moderate           [x]  Low  []  Minimal/Uncompromised PT SEEN FOR:  
 []  MD Consult: []Calorie Count []Diabetic Diet Education []Diet Education []Electrolyte Management []General Nutrition Management and Supplements []Management of Tube Feeding []TPN Recommendations [x]  RN Referral:  [x]MST score >=2 
   []Enteral/Parenteral Nutrition PTA []Pregnant: Gestational DM or Multigestation 
   []Pressure Ulcer/Wound Care needs 
     
[]  Low BMI 
[]  LOS Tammy Pack Pager [phone] Weekend Pager [phone]

## 2019-01-30 NOTE — ROUTINE PROCESS
Bedside and Verbal shift change report given to Callum Pro RN (oncoming nurse) by [employer], RN (offgoing nurse). Report included the following information SBAR, Kardex, Intake/Output and MAR. 
  
Zone Phone:   [phone] 
  
  
Significant changes during shift:   d/c BP meds 
  
Patient Information 
  
Cindy Powell 
80 y.o. 
1/28/2019  8:15 PM by Pauline Rose MD. Cindy Powell was admitted from Home 
  
Problem List 
  
    
Patient Active Problem List  
  Diagnosis Date Noted  Dementia 01/29/2019  Change in mental status 11/30/2018  Inability to walk 11/22/2017  Coronary artery disease involving native coronary artery without angina pectoris 05/13/2016  S/P CABG (coronary artery bypass graft) 05/13/2016  
 Hiatal hernia with gastroesophageal reflux 05/13/2016  H/O endovascular stent graft for abdominal aortic aneurysm 05/13/2016  CKD (chronic kidney disease) stage 3, GFR 30-59 ml/min ([facility]) 05/13/2016  AAA (abdominal aortic aneurysm) ([facility] [address] 11/07/2014  
  
    
Past Medical History:  
Diagnosis Date  Abdominal aortic aneurysm (HCC)    
 Arthritis    
 CAD (coronary artery disease)    
  bypass  Hypertension    
 Other ill-defined conditions(799.89)    
  gout  Other ill-defined conditions(799.89)    
  high cholesterol  
  
  
  
Core Measures: 
  
CVA: No No 
CHF:No No 
PNA:No No 
  
  
Activity Status: 
  
OOB to Chair No 
Ambulated this shift No  
Bed Rest No 
  
  
DVT prophylaxis: 
  
DVT prophylaxis Med- yes DVT prophylaxis SCD or ALMAS- No  
  
Wounds: (If Applicable) 
  
Wounds- No 
  
Location  
  
Patient Safety: 
  
Falls Score Total Score: 4 Safety Level_______ Bed Alarm On? Yes Sitter? No 
  
Plan for upcoming shift: Doppler 
  
  
  
Discharge Plan: No TBD 
  
Active Consults: 
IP CONSULT TO CARDIOLOGY

## 2019-01-30 NOTE — PHYSICIAN ADVISORY
Letter of Status Determination:  
Recommend hospitalization status upgraded from OBSERVATION  to INPATIENT  Status Pt Name:  Kd Barron  
MR#  
[address] [facility] # [id number] / 
[id number] Payor: Ajit Fox / Plan: [address] / Product Type: Medicare /   
CARLEE#  [id number] Room and Hospital  3110/01  @ [facility] Hospitalization date  1/28/2019  8:15 PM  
Current Attending Physician  Abbi Andrade MD  
Principal diagnosis  Dementia Clinicals  80 y.o. y.o  male hospitalized with above diagnosis The pt is receiving Rx for URENA, severe hypothyroidism (reminiscent of myxedema), and other chronic illnesses. It is noted that he is weak and therapy is recommending continued rehabilitation. Due to appropriate and necessary medical care, this pt's hospitalization has now exceeded two midnights . [place] ([facility]) criteria Does  NOT apply STATUS DETERMINATION  This patient is at above high risk of deterioration based on documented presenting clinical data, comorbid conditions, high risk of adverse events and current acute care course. Mr. Kd Barron now meets Inpatient Admission status criteria in accordance with CMS regulation Section 43 .3. Specifically, due to medical necessity the patient's stay now exceeds Two Midnights. It is our recommendation that this patient's hospitalization status should be upgraded from  OBSERVATION to INPATIENT status. The final decision of the patient's hospitalization status depends on the attending physician's judgment Additional comments Payor: Ajit Fox / Plan: [address] / Product Type: Medicare /   
  
 
Weston Rooney MD MPH FAC Cell: [phone] Physician Advisor [address] [facility] [address] [facility]  
President [employer] Staff, [address] [place] [address]   
Cell  [phone]   
 
 
[phone] Maritza Eduardo

## 2019-01-30 NOTE — PROGRESS NOTES
Bedside and Verbal shift change report given to Jared Bustamante (oncoming nurse) by Yessy Juares (offgoing nurse). Report included the following information SBAR, Intake/Output and MAR.

## 2019-01-30 NOTE — PROGRESS NOTES
Hospitalist Progress Note NAME: Chacho Jimenez :  3/15/1933 MRN:  [id number] Assessment / Plan: 
Chronic URENA ? Relation to Hiatal Hernia, ? Relation to Moderate Mitral Regurgitation; CXR Clear Recent ECHO shows normal EF with and  moderate regurgitation. CXR shows clear lung Hold metoprolol due to bradycardia. Cont lasix. -start PPI 
  
Hypothyroidism new onset with very high TSH 
-start levothyroxine 25 mcg and will need repeat TFT in 6 weeks 
  
Unsteady Gait likely due to ortho stasis and sinus bradycardia  
-stop seroquel as antipsychotics can cause gait disturbance 
-recent B12 checked and is wnl 
-orthostatics were positive so will hold metoprolol and Norvasc and reassess blood pressure 
-PT ordered 
-MRI from 2018 reviewed, no indication to repeat at this time 
-duplex Carotid shows less than 50% stenosis 
-d/w Dr Magdaleno Bhat about bradycardia 
  
CKD stage III: 
BPH: 
-renal function appears to be at baseline, continue to monitor 
-continue flomax 
 
  
Dementia: 
-continue Low dose Aricept and Namenda 
-stop Aricept due to bradycardia 
  
Code Status: Default to Full; needs to be clarified with Debbie Garcia (Son) did not answer phone when I called at 3 am on  Surrogate Decision Maker: Debbie Zelaya 
  
DVT Prophylaxis: sq heparin GI Prophylaxis: not indicated 
  
Baseline: declining functional status Body mass index is 25.77 kg/m². Subjective: Chief Complaint / Reason for Physician Visit Sitting up by the edge of the bed this am 
Reports dizziness Still with some sob and cough Review of Systems: 
Symptom Y/N Comments  Symptom Y/N Comments Fever/Chills    Chest Pain Poor Appetite    Edema Cough    Abdominal Pain Sputum    Joint Pain SOB/URENA y   Pruritis/Rash Nausea/vomit    Tolerating PT/OT Diarrhea    Tolerating Diet Constipation    Other Could NOT obtain due to:   
 
Objective: VITALS:  
 Last 24hrs VS reviewed since prior progress note. Most recent are: 
Patient Vitals for the past 24 hrs: 
 Temp Pulse Resp BP SpO2  
01/30/19 1159 97.5 °F (36.4 °C) 88 18 133/71 94 % 01/30/19 0803 97.9 °F (36.6 °C) (!) 47 18 110/59 97 % 01/30/19 0329 98.1 °F (36.7 °C) (!) 59 18 103/58 97 % 01/30/19 0224  (!) 52     
01/30/19 0000 97.8 °F (36.6 °C) (!) 57 18 112/52 99 % 01/29/19 2321  62     
01/29/19 1955 97.7 °F (36.5 °C) 72 18 126/68 100 % 01/29/19 1536 97.5 °F (36.4 °C) 62 18 136/88 100 % Intake/Output Summary (Last 24 hours) at 1/30/2019 1511 Last data filed at 1/30/2019 6937 Gross per 24 hour Intake 440 ml Output 325 ml Net 115 ml PHYSICAL EXAM: 
General: cooperative, no acute distress   
EENT:  EOMI. Anicteric sclerae. MMM Resp:  CTA bilaterally, no wheezing or rales. No accessory muscle use CV:  Regular  rhythm,  No edema GI:  Soft, Non distended, Non tender.  +Bowel sounds Neurologic:  Alert and awake, normal speech, Psych:   Not anxious nor agitated Skin:  No rashes. No jaundice Reviewed most current lab test results and cultures  YES Reviewed most current radiology test results   YES Review and summation of old records today    NO Reviewed patient's current orders and MAR    YES 
PMH/SH reviewed - no change compared to H&P Current Facility-Administered Medications:  
  influenza vaccine 2018-19 (6 mos+)(PF) (FLUARIX QUAD/FLULAVAL QUAD) injection 0.5 mL, 0.5 mL, IntraMUSCular, PRIOR TO DISCHARGE, Erlinda Danielle MD 
  LORazepam (ATIVAN) 2 mg/mL injection, , , ,  
  allopurinol (ZYLOPRIM) tablet 100 mg, 100 mg, Oral, DAILY, Erlinda Danielle MD, 100 mg at 01/30/19 0926 
  donepezil (ARICEPT) tablet 5 mg, 5 mg, Oral, QHS, Erlinda Danielle MD, 5 mg at 01/29/19 2208   furosemide (LASIX) tablet 20 mg, 20 mg, Oral, DAILY, Erlinda Danielle MD, 20 mg at 01/30/19 9305   memantine (NAMENDA) tablet 10 mg, 10 mg, Oral, BID, Erlinda Danielle, MD, 10 mg at 01/30/19 6742   tamsulosin (FLOMAX) capsule 0.4 mg, 0.4 mg, Oral, DAILY, Erlinda Danielle MD, 0.4 mg at 01/30/19 2932   sodium chloride (NS) flush 5-40 mL, 5-40 mL, IntraVENous, Q8H, Erlinda Danielle MD, 10 mL at 01/30/19 8939   sodium chloride (NS) flush 5-40 mL, 5-40 mL, IntraVENous, PRN, Erlinda Danielle MD 
  acetaminophen (TYLENOL) tablet 650 mg, 650 mg, Oral, Q4H PRN, Erlinda Danielle MD 
  ondansetron (ZOFRAN) injection 4 mg, 4 mg, IntraVENous, Q4H PRN, Erlinda Danielle MD 
  heparin (porcine) injection 5,000 Units, 5,000 Units, SubCUTAneous, Q8H, Erlinda Danielle MD, 5,000 Units at 01/30/19 8212   pantoprazole (PROTONIX) tablet 40 mg, 40 mg, Oral, ACB, Erlinda Danielle MD, 40 mg at 01/30/19 5687   levothyroxine (SYNTHROID) tablet 25 mcg, 25 mcg, Oral, Robbie Reddy MD, 25 mcg at 01/30/19 4457 
________________________________________________________________________ Care Plan discussed with: 
  Comments Patient y Family RN y   
Care Manager Consultant Multidiciplinary team rounds were held today with , nursing, pharmacist and clinical coordinator. Patient's plan of care was discussed; medications were reviewed and discharge planning was addressed. ________________________________________________________________________ Total NON critical care TIME:  35   Minutes Total CRITICAL CARE TIME Spent:   Minutes non procedure based Comments >50% of visit spent in counseling and coordination of care    
________________________________________________________________________ Geovanni Johnston MD  
 
Procedures: see electronic medical records for all procedures/Xrays and details which were not copied into this note but were reviewed prior to creation of Plan. LABS: 
I reviewed today's most current labs and imaging studies. Pertinent labs include: 
Recent Labs  
  01/30/19 
0245 01/28/19 
1708 01/27/19 
1740 WBC 5.9 6.6 7.1 HGB 11.3* 12.4 11.6* HCT 34.0* 37.3 34.7*  
 163 159 Recent Labs  
  01/30/19 
0245 01/28/19 
1708 01/27/19 
1749  140 140  
K 3.5 4.0 4.8  
 107 107 CO2 24 24 26 GLU 85 80 98 BUN 32* 33* 32* CREA 1.90* 2.12* 2.17* CA 8.1* 8.7 8.7 ALB 3.1* 3.8 3.7 TBILI 0.5 0.9 0.5 SGOT 24 28 30 ALT 26 34 38 Signed: Sophie Marvin MD

## 2019-01-30 NOTE — ROUTINE PROCESS
Bedside and Verbal shift change report given to Adonis Junior RN (oncoming nurse) by [employer], RN (offgoing nurse). Report included the following information SBAR, Kardex, Intake/Output and MAR. Zone Phone:   [phone] Significant changes during shift:  Pt admitted to floor. Patient Information Emily Candelaria 
80 y.o. 
1/28/2019  8:15 PM by Darlin Holman MD. Emily Candelaria was admitted from Home 
 
Problem List 
 
Patient Active Problem List  
 Diagnosis Date Noted  Dementia 01/29/2019  Change in mental status 11/30/2018  Inability to walk 11/22/2017  Coronary artery disease involving native coronary artery without angina pectoris 05/13/2016  S/P CABG (coronary artery bypass graft) 05/13/2016  
 Hiatal hernia with gastroesophageal reflux 05/13/2016  H/O endovascular stent graft for abdominal aortic aneurysm 05/13/2016  CKD (chronic kidney disease) stage 3, GFR 30-59 ml/min ([facility]) 05/13/2016  AAA (abdominal aortic aneurysm) ([facility] [address] 11/07/2014 Past Medical History:  
Diagnosis Date  Abdominal aortic aneurysm ([facility] [address]  Arthritis  CAD (coronary artery disease) bypass  Hypertension  Other ill-defined conditions(799.89)   
 gout  Other ill-defined conditions(799.89)   
 high cholesterol Core Measures: CVA: No No 
CHF:No No 
PNA:No No 
 
 
Activity Status: OOB to Chair No 
Ambulated this shift No  
Bed Rest No 
 
 
DVT prophylaxis: DVT prophylaxis Med- yes DVT prophylaxis SCD or ALMAS- No  
 
Wounds: (If Applicable) Wounds- No 
 
Location Patient Safety: 
 
Falls Score Total Score: 4 Safety Level_______ Bed Alarm On? Yes Sitter? No 
 
Plan for upcoming shift: Doppler Discharge Plan: No TBD Active Consults: 
IP CONSULT TO CARDIOLOGY

## 2019-01-31 PROCEDURE — 97116 GAIT TRAINING THERAPY: CPT

## 2019-01-31 PROCEDURE — 65660000000 HC RM CCU STEPDOWN

## 2019-01-31 PROCEDURE — 74011250636 HC RX REV CODE- 250/636: Performed by: INTERNAL MEDICINE

## 2019-01-31 PROCEDURE — 74011250637 HC RX REV CODE- 250/637: Performed by: HOSPITALIST

## 2019-01-31 PROCEDURE — 74011250637 HC RX REV CODE- 250/637: Performed by: INTERNAL MEDICINE

## 2019-01-31 RX ADMIN — PANTOPRAZOLE SODIUM 40 MG: 40 TABLET, DELAYED RELEASE ORAL at 10:28

## 2019-01-31 RX ADMIN — HEPARIN SODIUM 5000 UNITS: 5000 INJECTION INTRAVENOUS; SUBCUTANEOUS at 14:26

## 2019-01-31 RX ADMIN — Medication 10 ML: at 14:26

## 2019-01-31 RX ADMIN — MEMANTINE 10 MG: 10 TABLET ORAL at 18:19

## 2019-01-31 RX ADMIN — LEVOTHYROXINE SODIUM 25 MCG: 50 TABLET ORAL at 06:37

## 2019-01-31 RX ADMIN — MEMANTINE 10 MG: 10 TABLET ORAL at 10:28

## 2019-01-31 RX ADMIN — HEPARIN SODIUM 5000 UNITS: 5000 INJECTION INTRAVENOUS; SUBCUTANEOUS at 06:40

## 2019-01-31 RX ADMIN — ALLOPURINOL 100 MG: 100 TABLET ORAL at 10:28

## 2019-01-31 RX ADMIN — HEPARIN SODIUM 5000 UNITS: 5000 INJECTION INTRAVENOUS; SUBCUTANEOUS at 21:08

## 2019-01-31 RX ADMIN — FUROSEMIDE 20 MG: 40 TABLET ORAL at 10:28

## 2019-01-31 RX ADMIN — Medication 10 ML: at 03:38

## 2019-01-31 RX ADMIN — TAMSULOSIN HYDROCHLORIDE 0.4 MG: 0.4 CAPSULE ORAL at 10:28

## 2019-01-31 RX ADMIN — Medication 10 ML: at 21:08

## 2019-01-31 NOTE — PROGRESS NOTES
Radha Tsang, RN Registered Nurse NURSING Progress Notes Signed Date of Service:  01/31/19 1310 []Hide copied text []Hover for details Telemetry room called and stated pateint had 8 beats of v-tach. Patient in room playing with telemetry wires and denying nay pain or feeling odd in any way. Dr Mabel Severance, hospitalist on unit and viewed monitor and stated it looked like artifact.

## 2019-01-31 NOTE — PROGRESS NOTES
Cardiology Progress Note Patient ID: 
Patient: Glenny Marroquin  MRN: [id number] Age: 80 y.o.  : 3/15/1933 
 
2019 10:28 PM 
Admit Date: 2019 Assessment: 1. Mild sinus bradycardia which may be related to his hypothyroidism, and certainly the beta-blocker. 2. Bifascicular block which is chronic. No evidence of high grade or complete atrioventricular block. 3. Moderate nonrheumatic mitral regurgitation, likely asymptomatic. I do not think this is an immediate problem for him. 4. Hypertensive heart disease without heart failure, but with chronic kidney disease stage III. He is almost a stage IV. 5. Dementia. Does not appear to have a superimposed encephalopathy at this time. 6. No evidence of urinary tract infection. Enlarged prostate, on medication. Plan: 1. Agree with stopping the beta-blocker, though anticipate some rebound HR increase. 2. No pacemaker needed at this time. 3. Agree with thyroid supplementation. This will help modestly with the HR. 4. I'd continue with his dementia medication if it helps him from that aspect. The mild bradycardia (which will likely improve) does not contraindicate these meds if they help him. [x]       High complexity decision making was performed in this patient Subjective:  
 
Glenny Marroquin denies chest pain, palpitations, near-syncope, syncope, paroxysmal nocturnal dyspnea. Review of Systems - No melena, hematemesis, nausea, vomiting, diarrhea, dysuria, gross hematuria, suprapubic or abdominal pain, rash, headache, subjective fever, chills, wheezing, pleurisy, hemoptysis, or epistaxis. Objective:  
 
Physical Exam: 
Temp (24hrs), Av.7 °F (36.5 °C), Min:97.5 °F (36.4 °C), Max:98.1 °F (36.7 °C) Patient Vitals for the past 8 hrs: 
 Pulse 19 1933 61  
19 1517 (!) 56 Patient Vitals for the past 8 hrs: 
 Resp 01/30/19 1933 18  
01/30/19 1517 18 Patient Vitals for the past 8 hrs: 
 BP  
01/30/19 1933 105/72  
01/30/19 1517 118/60 Intake/Output Summary (Last 24 hours) at 1/30/2019 2228 Last data filed at 1/30/2019 5578 Gross per 24 hour Intake  Output 100 ml Net -100 ml Nondiaphoretic, not in acute distress. MMM, no jaundice, HEENT stable. Unlabored, clear to auscultation bilaterally, symmetric air movement. Regular rate and rhythm, no murmur, pericardial rub, knock, or gallop. No JVD or peripheral edema. Palpable radial and DP/PT pulses bilaterally. Abdomen soft, nontender, nondistended. No pulsatile masses or bruit. No cyanosis. Skin warm and dry. No ulcers or rash. Musculoskeletal exam stable. Awake, appropriate, neuro grossly nonfocal.  No tremor. Cardiographics and Studies, I personally reviewed: 
 
Telemetry:  Some sinus bradycardia. LAB Review: No results for input(s): CPK, CKMB, CKNDX, TROIQ in the last 72 hours. No lab exists for component: CPKMB Lab Results Component Value Date/Time Cholesterol, total 189 12/02/2018 05:26 AM  
 HDL Cholesterol 37 12/02/2018 05:26 AM  
 LDL, calculated 124.8 (H) 12/02/2018 05:26 AM  
 Triglyceride 136 12/02/2018 05:26 AM  
 CHOL/HDL Ratio 5.1 (H) 12/02/2018 05:26 AM  
 
No results for input(s): INR, PTP, APTT in the last 72 hours. No lab exists for component: INREXT Recent Labs  
  01/30/19 
0245 01/28/19 
1708  140  
K 3.5 4.0  
 107 CO2 24 24 BUN 32* 33* CREA 1.90* 2.12* GLU 85 80  
CA 8.1* 8.7 ALB 3.1* 3.8 WBC 5.9 6.6 HGB 11.3* 12.4 HCT 34.0* 37.3  163 Recent Labs  
  01/30/19 
0245 01/28/19 
1708 SGOT 24 28 AP 71 84  
TP 6.5 7.5 ALB 3.1* 3.8 GLOB 3.4 3.7 No components found for: [place] No results for input(s): PH, PCO2, PO2 in the last 72 hours. Medications Reviewed:  
No Known Allergies Current Facility-Administered Medications Medication Dose Route Frequency  influenza vaccine 2018-19 (6 mos+)(PF) (FLUARIX QUAD/FLULAVAL QUAD) injection 0.5 mL  0.5 mL IntraMUSCular PRIOR TO DISCHARGE  allopurinol (ZYLOPRIM) tablet 100 mg  100 mg Oral DAILY  furosemide (LASIX) tablet 20 mg  20 mg Oral DAILY  memantine (NAMENDA) tablet 10 mg  10 mg Oral BID  tamsulosin (FLOMAX) capsule 0.4 mg  0.4 mg Oral DAILY  sodium chloride (NS) flush 5-40 mL  5-40 mL IntraVENous Q8H  
 sodium chloride (NS) flush 5-40 mL  5-40 mL IntraVENous PRN  
 acetaminophen (TYLENOL) tablet 650 mg  650 mg Oral Q4H PRN  
 ondansetron (ZOFRAN) injection 4 mg  4 mg IntraVENous Q4H PRN  
 heparin (porcine) injection 5,000 Units  5,000 Units SubCUTAneous Q8H  
 pantoprazole (PROTONIX) tablet 40 mg  40 mg Oral ACB  levothyroxine (SYNTHROID) tablet 25 mcg  25 mcg Oral 6am  
  
 
 
Mikki Tarango MD 
1/30/2019

## 2019-01-31 NOTE — ROUTINE PROCESS
Bedside and Verbal shift change report given to Elmer Byers RN (oncoming nurse) by Kevin Gallagher RN (offgoing nurse). Report included the following information SBAR, Kardex, Intake/Output and MAR. 
  
Zone Phone:   [phone] 
  
  
Significant changes during shift:  [facility] 
  
Patient Information 
  
Ann Pedersen 
80 y.o. 
1/28/2019  8:15 PM by Todd Meier MD. Ann Pedersen was admitted from Home 
  
Problem List 
  
    
Patient Active Problem List  
  Diagnosis Date Noted  Dementia 01/29/2019  Change in mental status 11/30/2018  Inability to walk 11/22/2017  Coronary artery disease involving native coronary artery without angina pectoris 05/13/2016  S/P CABG (coronary artery bypass graft) 05/13/2016  
 Hiatal hernia with gastroesophageal reflux 05/13/2016  H/O endovascular stent graft for abdominal aortic aneurysm 05/13/2016  CKD (chronic kidney disease) stage 3, GFR 30-59 ml/min ([facility]) 05/13/2016  AAA (abdominal aortic aneurysm) ([facility] [address] 11/07/2014  
  
    
Past Medical History:  
Diagnosis Date  Abdominal aortic aneurysm ([facility])    
 Arthritis    
 CAD (coronary artery disease)    
  bypass  Hypertension    
 Other ill-defined conditions(799.89)    
  gout  Other ill-defined conditions(799.89)    
  high cholesterol  
  
  
  
Core Measures: 
  
CVA: No No 
CHF:No No 
PNA:No No 
  
  
Activity Status: 
  
OOB to Chair No 
Ambulated this shift No  
Bed Rest No 
  
  
DVT prophylaxis: 
  
DVT prophylaxis Med- yes DVT prophylaxis SCD or ALMAS- No  
  
Wounds: (If Applicable) 
  
Wounds- No 
  
Location  
  
Patient Safety: 
  
Falls Score Total Score: 4 Safety Level_______ Bed Alarm On? Yes Sitter? No 
  
Plan for upcoming shift: safety, monitor intake and output, encourage po fluids, Echo in AM 
  
  
  
Discharge Plan: No TBD 
  
Active Consults: 
IP CONSULT TO CARDIOLOGY

## 2019-01-31 NOTE — ROUTINE PROCESS
Bedside and Verbal shift change report given to Genia Linares RN (oncoming nurse) by Andrew Issa RN (offgoing nurse). Report included the following information SBAR, Kardex, Intake/Output and MAR. 
  
Zone Phone:   [phone] 
  
  
Significant changes during shift:  [facility] 
  
Patient Information 
  
Nicolle Minor 
80 y.o. 
1/28/2019  8:15 PM by Giovanna Oliveira MD. Nicolle Minor was admitted from Home 
  
Problem List 
  
    
Patient Active Problem List  
  Diagnosis Date Noted  Dementia 01/29/2019  Change in mental status 11/30/2018  Inability to walk 11/22/2017  Coronary artery disease involving native coronary artery without angina pectoris 05/13/2016  S/P CABG (coronary artery bypass graft) 05/13/2016  
 Hiatal hernia with gastroesophageal reflux 05/13/2016  H/O endovascular stent graft for abdominal aortic aneurysm 05/13/2016  CKD (chronic kidney disease) stage 3, GFR 30-59 ml/min ([facility]) 05/13/2016  AAA (abdominal aortic aneurysm) ([facility] [address] 11/07/2014  
  
    
Past Medical History:  
Diagnosis Date  Abdominal aortic aneurysm ([facility])    
 Arthritis    
 CAD (coronary artery disease)    
  bypass  Hypertension    
 Other ill-defined conditions(799.89)    
  gout  Other ill-defined conditions(799.89)    
  high cholesterol  
  
  
  
Core Measures: 
  
CVA: No No 
CHF:No No 
PNA:No No 
  
  
Activity Status: 
  
OOB to Chair No 
Ambulated this shift No  
Bed Rest No 
  
  
DVT prophylaxis: 
  
DVT prophylaxis Med- yes DVT prophylaxis SCD or ALMAS- No  
  
Wounds: (If Applicable) 
  
Wounds- No 
  
Location  
  
Patient Safety: 
  
Falls Score Total Score: 4 Safety Level_______ Bed Alarm On? Yes Sitter? No 
  
Plan for upcoming shift: safety, monitor intake and output, encourage po fluids, Echo in AM 
  
  
  
Discharge Plan: No TBD 
  
Active Consults: 
IP CONSULT TO CARDIOLOGY

## 2019-01-31 NOTE — PROGRESS NOTES
Problem: Falls - Risk of 
Goal: *Absence of Falls Document Red[place] Officer Fall Risk and appropriate interventions in the flowsheet. Outcome: Progressing Towards Goal 
Fall Risk Interventions: 
Mobility Interventions: PT Consult for mobility concerns Mentation Interventions: Adequate sleep, hydration, pain control Medication Interventions: Evaluate medications/consider consulting pharmacy Elimination Interventions: Call light in reach

## 2019-01-31 NOTE — ROUTINE PROCESS
Bedside and Verbal shift change [facility] nurse) by Genia Linares RN (offgoing nurse). Report included the following information SBAR, Kardex, Intake/Output and MAR. 
  
Zone Phone:   [phone] 
  
  
Significant changes during shift:   Up to BR to void and have BM 
  
Patient Information 
  
Nicolle Minor 
80 y.o. 
1/28/2019  8:15 PM by Giovanna Oliveira MD. Nicolle Minor was admitted from Home 
  
Problem List 
  
    
Patient Active Problem List  
  Diagnosis Date Noted  Dementia 01/29/2019  Change in mental status 11/30/2018  Inability to walk 11/22/2017  Coronary artery disease involving native coronary artery without angina pectoris 05/13/2016  S/P CABG (coronary artery bypass graft) 05/13/2016  
 Hiatal hernia with gastroesophageal reflux 05/13/2016  H/O endovascular stent graft for abdominal aortic aneurysm 05/13/2016  CKD (chronic kidney disease) stage 3, GFR 30-59 ml/min ([facility]) 05/13/2016  AAA (abdominal aortic aneurysm) ([facility] [address] 11/07/2014  
  
    
Past Medical History:  
Diagnosis Date  Abdominal aortic aneurysm ([facility])    
 Arthritis    
 CAD (coronary artery disease)    
  bypass  Hypertension    
 Other ill-defined conditions(799.89)    
  gout  Other ill-defined conditions(799.89)    
  high cholesterol  
  
  
  
Core Measures: 
  
CVA: No No 
CHF:No No 
PNA:No No 
  
  
Activity Status: 
  
OOB to Chair No 
Ambulated this shift yes Bed Rest No 
  
  
DVT prophylaxis: 
  
DVT prophylaxis Med- yes DVT prophylaxis SCD or ALMAS- No  
  
Wounds: (If Applicable) 
  
Wounds- No 
  
Location  
  
Patient Safety: 
  
Falls Score Total Score: 4 Safety Level_______ Bed Alarm On? Yes Sitter? No 
  
Plan for upcoming shift: safety, monitor intake and output, encourage po fluids,  
  
  
  
Discharge Plan: No TBD 
  
Active Consults: 
IP CONSULT TO CARDIOLOGY

## 2019-01-31 NOTE — PROGRESS NOTES
Problem: Falls - Risk of 
Goal: *Absence of Falls Document [place] Rank Fall Risk and appropriate interventions in the flowsheet. Outcome: Progressing Towards Goal 
Fall Risk Interventions: 
Mobility Interventions: PT Consult for mobility concerns Mentation Interventions: Adequate sleep, hydration, pain control Medication Interventions: Evaluate medications/consider consulting pharmacy Elimination Interventions: Call light in reach

## 2019-01-31 NOTE — PROGRESS NOTES
Problem: Mobility Impaired (Adult and Pediatric) Goal: *Acute Goals and Plan of Care (Insert Text) Physical Therapy Goals Initiated 1/29/2019 1. Patient will move from supine to sit and sit to supine , scoot up and down and roll side to side in bed with independence within 7 day(s). 2.  Patient will transfer from bed to chair and chair to bed with supervision/set-up using the least restrictive device within 7 day(s). 3.  Patient will perform sit to stand with supervision/set-up within 7 day(s). 4.  Patient will ambulate with supervision/set-up for 250 feet with the least restrictive device within 7 day(s). 5.  Patient will ascend/descend 13 stairs with 1 handrail(s) with supervision/set-up within 7 day(s). physical Therapy TREATMENT Patient: Eleazar Joseph (89 y.o. male) Date: 1/31/2019 Diagnosis: Dementia Dementia Bradycardia <principal problem not specified> Precautions:   
Chart, physical therapy assessment, plan of care and goals were reviewed. ASSESSMENT: 
Pt cleared by nurse to mobilize. Pt received in bed supine taking a nap. Pt agreeable to therapy after encouragement. Pt performed supine to sit independently. Pt performed sit to stand transfer at [facility] due to pt impulsively pulling up on walker. Pt able to come to stand safely. Pt ambulated 125ft with RW at [facility]. Pt then ambulated 125ft at [facility] due to pt being unsteady. Pt moving well although impulsive at times. Pt will need 24/7 supervision for safety at home or will need SNF. Progression toward goals: 
[x]    Improving appropriately and progressing toward goals 
[]    Improving slowly and progressing toward goals 
[]    Not making progress toward goals and plan of care will be adjusted PLAN: 
Patient continues to benefit from skilled intervention to address the above impairments. Continue treatment per established plan of care. Discharge Recommendations:  24/7 supervision or SNF rehab Further Equipment Recommendations for Discharge:  Pt reported having 2 RW at home SUBJECTIVE:  
Patient stated I'll walk with you.  OBJECTIVE DATA SUMMARY:  
Critical Behavior: 
Neurologic State: Alert Orientation Level: Oriented X4 Cognition: Follows commands Functional Mobility Training: 
Bed Mobility: 
  
Supine to Sit: Independent Sit to Supine: Supervision Transfers: 
Sit to Stand: Contact guard assistance;Stand-by assistance Stand to Sit: Supervision Balance: 
Sitting: Intact Standing: Impaired; Without support Standing - Static: Good;Constant support Standing - Dynamic : FairAmbulation/Gait Training: 
Distance (ft): 250 Feet (ft)(125 ft x2 with and without RW) Assistive Device: Gait belt;Walker, rolling Ambulation - Level of Assistance: Contact guard assistance Gait Abnormalities: Decreased step clearance; Path deviations Base of Support: Narrowed Speed/Darlene: Fluctuations; Pace decreased (<100 feet/min) Step Length: Left shortened;Right shortened Pain: 
Pain Scale 1: Numeric (0 - 10) Pain Intensity 1: 0 Activity Tolerance:  
Pt moving well although impulsive. After treatment:  
[]    Patient left in no apparent distress sitting up in chair 
[x]    Patient left in no apparent distress in bed 
[x]    Call bell left within reach [x]    Nursing notified 
[]    Caregiver present [x]    Bed alarm activated COMMUNICATION/COLLABORATION:  
The patients plan of care was discussed with: Registered Nurse Abbie Gama Time Calculation: 11 mins

## 2019-01-31 NOTE — PROGRESS NOTES
Problem: Falls - Risk of 
Goal: *Absence of Falls Document Izzy Morales Fall Risk and appropriate interventions in the flowsheet. Outcome: Progressing Towards Goal 
Fall Risk Interventions: 
Mobility Interventions: OT consult for ADLs, PT Consult for mobility concerns Mentation Interventions: More frequent rounding, Bed/chair exit alarm, Reorient patient Medication Interventions: Bed/chair exit alarm, Patient to call before getting OOB Elimination Interventions: Bed/chair exit alarm, Call light in reach, Patient to call for help with toileting needs

## 2019-02-01 LAB
ANION GAP SERPL CALC-SCNC: 7 MMOL/L (ref 5–15)
BNP SERPL-MCNC: 518 PG/ML (ref 0–450)
BUN SERPL-MCNC: 26 MG/DL (ref 6–20)
BUN/CREAT SERPL: 15 (ref 12–20)
CALCIUM SERPL-MCNC: 8.1 MG/DL (ref 8.5–10.1)
CHLORIDE SERPL-SCNC: 106 MMOL/L (ref 97–108)
CO2 SERPL-SCNC: 25 MMOL/L (ref 21–32)
CREAT SERPL-MCNC: 1.73 MG/DL (ref 0.7–1.3)
GLUCOSE SERPL-MCNC: 91 MG/DL (ref 65–100)
MAGNESIUM SERPL-MCNC: 2 MG/DL (ref 1.6–2.4)
POTASSIUM SERPL-SCNC: 3.6 MMOL/L (ref 3.5–5.1)
SODIUM SERPL-SCNC: 138 MMOL/L (ref 136–145)

## 2019-02-01 PROCEDURE — 36415 COLL VENOUS BLD VENIPUNCTURE: CPT

## 2019-02-01 PROCEDURE — 97116 GAIT TRAINING THERAPY: CPT

## 2019-02-01 PROCEDURE — 74011250637 HC RX REV CODE- 250/637: Performed by: HOSPITALIST

## 2019-02-01 PROCEDURE — 83880 ASSAY OF NATRIURETIC PEPTIDE: CPT

## 2019-02-01 PROCEDURE — 80048 BASIC METABOLIC PNL TOTAL CA: CPT

## 2019-02-01 PROCEDURE — 65660000000 HC RM CCU STEPDOWN

## 2019-02-01 PROCEDURE — 74011250636 HC RX REV CODE- 250/636: Performed by: EMERGENCY MEDICINE

## 2019-02-01 PROCEDURE — 83735 ASSAY OF MAGNESIUM: CPT

## 2019-02-01 PROCEDURE — 74011250637 HC RX REV CODE- 250/637: Performed by: INTERNAL MEDICINE

## 2019-02-01 PROCEDURE — 74011250636 HC RX REV CODE- 250/636: Performed by: INTERNAL MEDICINE

## 2019-02-01 RX ORDER — LEVOTHYROXINE SODIUM 50 UG/1
50 TABLET ORAL
Status: DISCONTINUED | OUTPATIENT
Start: 2019-02-02 | End: 2019-02-02 | Stop reason: HOSPADM

## 2019-02-01 RX ADMIN — MEMANTINE 10 MG: 10 TABLET ORAL at 09:10

## 2019-02-01 RX ADMIN — ALLOPURINOL 100 MG: 100 TABLET ORAL at 09:11

## 2019-02-01 RX ADMIN — PANTOPRAZOLE SODIUM 40 MG: 40 TABLET, DELAYED RELEASE ORAL at 06:39

## 2019-02-01 RX ADMIN — HEPARIN SODIUM 5000 UNITS: 5000 INJECTION INTRAVENOUS; SUBCUTANEOUS at 05:53

## 2019-02-01 RX ADMIN — TAMSULOSIN HYDROCHLORIDE 0.4 MG: 0.4 CAPSULE ORAL at 09:11

## 2019-02-01 RX ADMIN — Medication 10 ML: at 22:32

## 2019-02-01 RX ADMIN — LEVOTHYROXINE SODIUM 25 MCG: 50 TABLET ORAL at 05:53

## 2019-02-01 RX ADMIN — FUROSEMIDE 20 MG: 40 TABLET ORAL at 09:11

## 2019-02-01 RX ADMIN — SODIUM CHLORIDE 250 ML: 900 INJECTION, SOLUTION INTRAVENOUS at 18:32

## 2019-02-01 RX ADMIN — HEPARIN SODIUM 5000 UNITS: 5000 INJECTION INTRAVENOUS; SUBCUTANEOUS at 13:48

## 2019-02-01 RX ADMIN — MEMANTINE 10 MG: 10 TABLET ORAL at 17:50

## 2019-02-01 RX ADMIN — HEPARIN SODIUM 5000 UNITS: 5000 INJECTION INTRAVENOUS; SUBCUTANEOUS at 22:32

## 2019-02-01 RX ADMIN — Medication 10 ML: at 05:53

## 2019-02-01 RX ADMIN — Medication 10 ML: at 13:48

## 2019-02-01 NOTE — ROUTINE PROCESS
Bedside and Verbal shift change report given to Xavier Kessler RN (oncoming nurse) by Sameer hart). Report included the following information SBAR, Kardex, Intake/Output and MAR. 
  
Zone Phone:   [phone]  
  
  
Significant changes during shift:  None Patient Information 
  
Aislinn Conn 
80 y.o. 
1/28/2019  8:15 PM by Crista Moore MD. Alexis Lira admitted from Home 
  
Problem List 
  
       
Patient Active Problem List  
  Diagnosis Date Noted  Dementia 01/29/2019  Change in mental status 11/30/2018  Inability to walk 11/22/2017  Coronary artery disease involving native coronary artery without angina pectoris 05/13/2016  S/P CABG (coronary artery bypass graft) 05/13/2016  
 Hiatal hernia with gastroesophageal reflux 05/13/2016  H/O endovascular stent graft for abdominal aortic aneurysm 05/13/2016  CKD (chronic kidney disease) stage 3, GFR 30-59 ml/min ([facility]) 05/13/2016  AAA (abdominal aortic aneurysm) ([facility] [address] 11/07/2014  
  
       
Past Medical History:  
Diagnosis Date  Abdominal aortic aneurysm (HCC)    
 Arthritis    
 CAD (coronary artery disease)    
  bypass  Hypertension    
 Other ill-defined conditions(799.89)    
  gout  Other ill-defined conditions(799.89)    
  high cholesterol  
  
  
  
Core Measures: 
  
CVA: No No 
CHF:No No 
PNA:No No 
  
  
Activity Status: 
  
OOB to [place] Ambulated this shift yes  
Bed Rest No 
  
  
DVT prophylaxis: 
  
DVT prophylaxis Med- yes DVT prophylaxis SCD or ALMAS- No  
  
Wounds: (If Applicable) 
  
Wounds- No 
  
Location  
  
Patient Safety: 
  
Falls Score Total Score: 4 Safety Level_______ Bed Alarm On? Yes Sitter? No 
  
Plan for upcoming shift: safety, monitor intake and output, encourage po fluids, needs consult to OT for tomorrow 
  
  
  
Discharge Plan: No TBD 
  
Active Consults: 
IP CONSULT TO CARDIOLOGY

## 2019-02-01 NOTE — BH NOTES
1:25PM 
Physician IP order reviewed. Pt eligible for Medicare SNF stay as of 2/2/19. BLADE Cuellar Care Manager

## 2019-02-01 NOTE — PROGRESS NOTES
Problem: Falls - Risk of 
Goal: *Absence of Falls Document Lillian De Leon Fall Risk and appropriate interventions in the flowsheet. Outcome: Progressing Towards Goal 
Fall Risk Interventions: 
Mobility Interventions: Patient to call before getting OOB Mentation Interventions: Reorient patient Medication Interventions: Patient to call before getting OOB Elimination Interventions: Patient to call for help with toileting needs

## 2019-02-01 NOTE — PROGRESS NOTES
Cm made multiple phone calls to pt's family member to discuss RADHA. Left a message to pt's daughter in law Cyndy Espinoza also spoke to Daughter Shen White [phone] who is working as a  ER RN . Shen White would like to send pt to rehab and then transition to her house. We talked about personal care or assisted living arrangement for member. Also encourage family to apply for medicaid expansion which may qualify member to obtain LTC assistance. Shen White will come to hospital tomorrow. SNF list provided and family choose [facility] and a referral send to [facility] via [address]. Reason for Admission:   Dementia and Bradycardia RRAT Score:     22 Resources/supports as identified by patient/family:   Son and daughter Top Challenges facing patient (as identified by patient/family and CM): Finances/Medication cost?      Not and issue however pt would like to apply for [facility] Transportation? BLS- family usually provide transportation for shopping and appointments. Support system or lack thereof? Family Living arrangements? Apartment Self-care/ADLs/Cognition? Dementia, need assistance. Current Advanced Directive/Advance Care Plan:  Full code. Plan for utilizing home health:    Yes after rehab Likelihood of readmission: Low Transition of Care Plan:  SNF Care Management Interventions PCP Verified by CM: Yes Mode of Transport at Discharge: S Transition of Care Consult (CM Consult): SNF Partner SNF: Yes Discharge Durable Medical Equipment: No 
Physical Therapy Consult: Yes Occupational Therapy Consult: No 
Speech Therapy Consult: No 
Current Support Network: Lives Alone(in an appartment- family would liek to keep him stay with them until he become stronger.) Confirm Follow Up Transport: Family Plan discussed with Pt/Family/Caregiver:  Yes 
 Freedom of Choice Offered: Yes Discharge Location Discharge Placement: Skilled nursing facility Venkata Capellan MSW 
ED Case Manager Ext -[phone]

## 2019-02-01 NOTE — PROGRESS NOTES
Informed daughter and patient he was accepted at [facility] when medically stable. FOC signed and placed on chart. Patient eligible for snf on 2/2/2019. Dr Lou Mcintyre called and made aware .

## 2019-02-01 NOTE — ROUTINE PROCESS
Bedside and Verbal shift change report given to Jono Cole 86 (oncoming nurse) by Marshal Salazar RN (offgoing nurse). Report included the following information SBAR, Kardex, Intake/Output and MAR. 
  
Zone Phone:   [phone] 
  
  
Significant changes during shift:  None Patient Information 
  
Raleigh Eddy 
80 y.o. 
1/28/2019  8:15 PM by Dianah Simmonds, MD. Raleigh Eddy was admitted from Home 
  
Problem List 
  
    
Patient Active Problem List  
  Diagnosis Date Noted  Dementia 01/29/2019  Change in mental status 11/30/2018  Inability to walk 11/22/2017  Coronary artery disease involving native coronary artery without angina pectoris 05/13/2016  S/P CABG (coronary artery bypass graft) 05/13/2016  
 Hiatal hernia with gastroesophageal reflux 05/13/2016  H/O endovascular stent graft for abdominal aortic aneurysm 05/13/2016  CKD (chronic kidney disease) stage 3, GFR 30-59 ml/min ([facility]) 05/13/2016  AAA (abdominal aortic aneurysm) ([facility] [address] 11/07/2014  
  
    
Past Medical History:  
Diagnosis Date  Abdominal aortic aneurysm ([facility])    
 Arthritis    
 CAD (coronary artery disease)    
  bypass  Hypertension    
 Other ill-defined conditions(799.89)    
  gout  Other ill-defined conditions(799.89)    
  high cholesterol  
  
  
  
Core Measures: 
  
CVA: No No 
CHF:No No 
PNA:No No 
  
  
Activity Status: 
  
OOB to Chair No 
Ambulated this shift yes Bed Rest No 
  
  
DVT prophylaxis: 
  
DVT prophylaxis Med- yes DVT prophylaxis SCD or ALMAS- No  
  
Wounds: (If Applicable) 
  
Wounds- No 
  
Location  
  
Patient Safety: 
  
Falls Score Total Score: 4 Safety Level_______ Bed Alarm On? Yes Sitter? No 
  
Plan for upcoming shift: safety, monitor intake and output, encourage po fluids, needs consult to OT for tomorrow 
  
  
  
Discharge Plan: No TBD 
  
Active Consults: 
IP CONSULT TO CARDIOLOGY

## 2019-02-01 NOTE — PROGRESS NOTES
Hospitalist Progress Note NAME: Aislinn Conn :  3/15/1933 MRN:  [id number] Assessment / Plan: 
 
  
Chronic URENA  Unclear cause improved ? Relation to Hiatal Hernia, ? Relation to Moderate Mitral Regurgitation Symptoms improved and he is now room air and denies any sob CXR Clear Recent ECHO shows normal EF with and  moderate regurgitation. CXR shows clear lung BNP level 518, no acute CHF sxs Hold metoprolol due to bradycardia. Cont lasix. watch bp and creat No further work up recommended by Dr Maura Oneal 
-cont  PPI Will need out pt f/u with surgery due to large hiatal hernia 
  
Hypothyroidism, very high TSH 91 
-has not been on supp that we can see and he does not know his meds, but TSH was 76 3/18 and 84 in  
-start levothyroxine 25 mcg, increase to 50 today and will need repeat TFT in 6 weeks 
  
Unsteady Gait likely due to orthostasis and sinus bradycardia  
-stop seroquel as antipsychotics can cause gait disturbance 
-recent B12 checked and is wnl 
-orthostatics were positive so will hold metoprolol and Norvasc and reassess blood pressure. Recheck orthostatic vitals -PT recs for 24hr care, SNF, needs LTC 
-MRI from 2018 reviewed, no indication to repeat at this time 
-duplex Carotid shows less than 50% stenosis 
  
  
CKD stage III: 
BPH: 
-renal function appears to be at baseline, continue to monitor 
-continue flomax 
  
  
Dementia: 
Suspect acute  On chronic Met enceph from severe hypothyroidism, untreated, long stanging 
-continue Low dose Aricept and Namenda 
-stop Aricept due to bradycardia 
-treat hypothyroidism 
  
? NSVT vs artifact Most likely artifact 
ekg nsr with base line RBBB Electrolytes ok Seen by cards, treat hypothyroidism, likely also contributing to bradycadia 
  
4.5 mm lung nodule Will need out pt f/u 
  
Dispo: to SNF, accepted  care for tomorrow, discussed with daughter, ultimately may need LTC or 24hr care at home, daughter considering taking him to her home 
  
Code Status: Full Surrogate Decision Maker: Oseas Nikita Bran 
  
DVT Prophylaxis: sq heparin GI Prophylaxis: not indicated 
  
Baseline: declining functional status 
  
 
30.0 - 39.9 Obese / Body mass index is 25.77 kg/m². Recommended Disposition: SNF/LTC Subjective: Chief Complaint / Reason for Physician Visit Cueva/off balance Patient has no complaints. He lives by himself and his daughter is at the bedside. She reports he has had progressive memory difficulties over the last several months and can no longer care for him self at home. She is willing to ultimately take him home with arrangement of 24-hour care but currently would like him to go to rehab. The patient does not recall having any problems with his thyroid and whether he is on any medications for it he is not clear on why he was brought to the hospital is except to say that he was having some balance problems but now he is doing fine. Patient was evaluated at 1 PM 
 
  Discussed with RN events overnight. Review of Systems: 
Symptom Y/N Comments  Symptom Y/N Comments Fever/Chills    Chest Pain n   
Poor Appetite n   Edema Cough n   Abdominal Pain n   
Sputum    Joint Pain SOB/CUEVA n   Pruritis/Rash Nausea/vomit n   Tolerating PT/OT Diarrhea    Tolerating Diet y Constipation    Other Could NOT obtain due to:   
 
Objective: VITALS:  
Last 24hrs VS reviewed since prior progress note. Most recent are: 
Patient Vitals for the past 24 hrs: 
 Temp Pulse Resp BP SpO2  
02/01/19 1524 97.7 °F (36.5 °C) (!) 59 18 130/76 100 % 02/01/19 1124 98.2 °F (36.8 °C) (!) 59 18 118/79 100 % 02/01/19 0750  73  (!) 80/58   
02/01/19 0748  68  125/82   
02/01/19 0747 97.6 °F (36.4 °C) 64 16 142/83 94 % 02/01/19 0345 97.3 °F (36.3 °C) (!) 55 16 137/77 96 % 02/01/19 0030 97.3 °F (36.3 °C) 67 17 117/70 96 % 01/31/19 1948 98.3 °F (36.8 °C) 60 18 122/68 99 % No intake or output data in the 24 hours ending 02/01/19 1656 PHYSICAL EXAM: 
Patient is awake and alert on room air sitting in chair no distress. He is oriented to self, hospital he knows it is February 2019 but could not tell me the name of our president. Conjunctiva pink. Mucous membranes moist.  Cardiovascular regular rate with a systolic murmur no rubs or gallops. Lungs are clear no wheezes rhonchi or crackles. Abdomen bowel sounds present soft nontender. Extremities no clubbing cyanosis or edema. Neuro exam is grossly nonfocal. 
 
 
 
Reviewed most current lab test results and cultures  YES Reviewed most current radiology test results   YES Review and summation of old records today    NO Reviewed patient's current orders and MAR    YES 
PMH/ reviewed - no change compared to H&P 
________________________________________________________________________ Care Plan discussed with: 
  Comments Patient y Family  y   
RN y   
Care Manager y Consultant Multidiciplinary team rounds were held today with , nursing, pharmacist and clinical coordinator. Patient's plan of care was discussed; medications were reviewed and discharge planning was addressed. ________________________________________________________________________ Total NON critical care TIME:   Minutes Total CRITICAL CARE TIME Spent:   Minutes non procedure based Comments >50% of visit spent in counseling and coordination of care    
________________________________________________________________________ Hima Valentin MD  
 
Procedures: see electronic medical records for all procedures/Xrays and details which were not copied into this note but were reviewed prior to creation of Plan. LABS: 
I reviewed today's most current labs and imaging studies. Pertinent labs include: 
Recent Labs  
  01/30/19 
0245 WBC 5.9 HGB 11.3* HCT 34.0*  
 Recent Labs 02/01/19 
4632 01/30/19 
0245  140  
K 3.6 3.5  107 CO2 25 24 GLU 91 85 BUN 26* 32* CREA 1.73* 1.90* CA 8.1* 8.1*  
MG 2.0  --   
ALB  --  3.1* TBILI  --  0.5 SGOT  --  24 ALT  --  26 Signed: Andres Durbin MD

## 2019-02-02 VITALS
WEIGHT: 190 LBS | OXYGEN SATURATION: 97 % | HEIGHT: 72 IN | SYSTOLIC BLOOD PRESSURE: 120 MMHG | DIASTOLIC BLOOD PRESSURE: 68 MMHG | HEART RATE: 61 BPM | RESPIRATION RATE: 18 BRPM | TEMPERATURE: 97.3 F | BODY MASS INDEX: 25.73 KG/M2

## 2019-02-02 PROCEDURE — 74011250636 HC RX REV CODE- 250/636: Performed by: INTERNAL MEDICINE

## 2019-02-02 PROCEDURE — 74011250637 HC RX REV CODE- 250/637: Performed by: INTERNAL MEDICINE

## 2019-02-02 PROCEDURE — 90686 IIV4 VACC NO PRSV 0.5 ML IM: CPT | Performed by: INTERNAL MEDICINE

## 2019-02-02 PROCEDURE — 74011250637 HC RX REV CODE- 250/637: Performed by: EMERGENCY MEDICINE

## 2019-02-02 PROCEDURE — 90471 IMMUNIZATION ADMIN: CPT

## 2019-02-02 RX ORDER — LEVOTHYROXINE SODIUM 50 UG/1
50 TABLET ORAL
Qty: 30 TAB | Refills: 0 | Status: SHIPPED
Start: 2019-02-03 | End: 2019-03-05

## 2019-02-02 RX ORDER — PANTOPRAZOLE SODIUM 40 MG/1
40 TABLET, DELAYED RELEASE ORAL
Qty: 30 TAB | Refills: 0 | Status: SHIPPED | OUTPATIENT
Start: 2019-02-03 | End: 2019-03-05

## 2019-02-02 RX ADMIN — PANTOPRAZOLE SODIUM 40 MG: 40 TABLET, DELAYED RELEASE ORAL at 08:04

## 2019-02-02 RX ADMIN — INFLUENZA VIRUS VACCINE 0.5 ML: 15; 15; 15; 15 SUSPENSION INTRAMUSCULAR at 10:46

## 2019-02-02 RX ADMIN — ALLOPURINOL 100 MG: 100 TABLET ORAL at 09:16

## 2019-02-02 RX ADMIN — Medication 10 ML: at 06:08

## 2019-02-02 RX ADMIN — LEVOTHYROXINE SODIUM 50 MCG: 50 TABLET ORAL at 06:07

## 2019-02-02 RX ADMIN — TAMSULOSIN HYDROCHLORIDE 0.4 MG: 0.4 CAPSULE ORAL at 09:16

## 2019-02-02 RX ADMIN — MEMANTINE 10 MG: 10 TABLET ORAL at 09:16

## 2019-02-02 RX ADMIN — HEPARIN SODIUM 5000 UNITS: 5000 INJECTION INTRAVENOUS; SUBCUTANEOUS at 06:07

## 2019-02-02 NOTE — PROGRESS NOTES
Problem: Falls - Risk of 
Goal: *Absence of Falls Document Janna Natalio Fall Risk and appropriate interventions in the flowsheet. Outcome: Progressing Towards Goal 
Fall Risk Interventions: 
Mobility Interventions: Patient to call before getting OOB Mentation Interventions: Adequate sleep, hydration, pain control Medication Interventions: Patient to call before getting OOB Elimination Interventions: Call light in reach

## 2019-02-02 NOTE — PROGRESS NOTES
Weekend CM reviewed medical record, followed up re: transitional care plans. Patient medically cleared for d/c today 2/2/19. Transitional care plan remains to d/c to [facility], [facility], [phone]. CM confirmed acceptance for transfer admission today w/ [facility] Admissions Director Deondre Haynes. Nursing to arranged [facility] transport ([phone]), and please call report to  (Patient will be in room # 205). CM remains available to further assist w/ any additional case management needs. JAYSON Ross 
 
 
10:10AM Update: 
CM received callback from RN, per [facility] Patient does not meet medical necessity for BLS transportation, is able to transport via wheelchair van. CM called and spoke with Patient's Daughter Pee Oswald [phone] who advised she is unable to assist w/ d/c transport, is working (ER nurse) until 7:30PM today. As requested, LEIDA contacted [facility] [phone] and assisted w/ transportation scheduling, daughter Pee Oswald to call directly for private payment. Discharge transportation w/ [place] scheduled for 12PM, with transfer to  [facility], [facility], [address] [facility], [phone], Room # 205. CM notified RN. CM remains available to further assist w/ any additional case management needs. JAYSON Ross

## 2019-02-02 NOTE — DISCHARGE INSTRUCTIONS
HOSPITALIST DISCHARGE INSTRUCTIONS    NAME: Tamara Traore   :  3/15/1933   MRN:  [id number]     Date/Time:  2019 9:14 AM    ADMIT DATE: 2019   DISCHARGE DATE: 2019     Attending Physician: Melissa Bryan MD    DISCHARGE DIAGNOSIS:  Chronic dyspnea on exertion, unclear etiology  Large hiatal hernia, possibly contributing to respiratory symptoms  Moderate mitral regurgitation with normal EF no sign of CHF  Sinus bradycardia, secondary to hypothyroidism and medications  Severe hypothyroidism, now on new supplementation needs follow-up TSH in 4 weeks  Orthostatic hypotension  History of hypertension, medication currently on hold due to hypotension now improved  Chronic kidney disease stage III  BPH  History of dementia  Acute metabolic encephalopathy, secondary to severe hypothyroidism  Suspected nonsustained VT, resolved  Pulmonary nodule, needs follow-up  overweight status, bmi 25.7        Medications: Per above medication reconciliation. Pain Management: per above medications    Recommended diet: Cardiac Diet    Recommended activity: Activity as tolerated and PT/OT Eval and Treat    Wound care: None    Indwelling devices:  None    Supplemental Oxygen: None    Required Lab work: Per SNF routine, Weekly BMP, Weekly CBC and TSH in 4 weeks    Glucose management:  None    Code status: Full        Outside physician follow up: Follow-up Information     Follow up With Specialties Details Why Contact Info    [address]      Robert Dong MD Internal Medicine   [address] [place] [address] Drive  [address] [facility]  [address]      Parminder Rizvi MD Cardiology   [address]  [address] [address] [phone]                 Skilled nursing facility/ SNF MD responsible for above on discharge.          Information obtained by :  I understand that if any problems occur once I am at home I am to contact my physician. I understand and acknowledge receipt of the instructions indicated above.                                                                                                                                            Physician's or R.N.'s Signature                                                                  Date/Time                                                                                                                                              Patient or Repres

## 2019-02-02 NOTE — ROUTINE PROCESS
2274- Patient has discharge orders. Spoke with  to see if [facility] was ready for him and patient had no other needs. CM said will get back to me after calling [facility]. 0461- Received call back from . Informed of room number patient is going into, address, and phone number to call report. Also provided me with [facility] phone number to set up transport. 46- Tried to call [facility] to set up transport and they stated that they can't transport patient on stretcher unless medically necessary, pt is ambulatory. 1000- Called CM again and explained situation. She stated that she will try to find out a time for  from family if they can transport him. Awaiting call back. 1010- Received call back from . Stated that daughter in law has set up transportation with [employer] with private pay. Transport is set up to  patient at noon. [address] to give report. 1030- Report called over to [facility]. Gave report to Alesia Salas. She requested that paperwork be faxed over as well. Will fax over paperwork

## 2019-02-02 NOTE — DISCHARGE SUMMARY
Hospitalist Discharge Summary     Patient ID:  Meghan Snyder  [id number]  80 y.o.  3/15/1933    PCP on record: Isaiah Adames MD    Admit date: 1/28/2019  Discharge date and time: 2/2/2019      DISCHARGE DIAGNOSIS:    Chronic dyspnea on exertion, unclear etiology  Large hiatal hernia, possibly contributing to respiratory symptoms  Moderate mitral regurgitation with normal EF no sign of CHF  Sinus bradycardia, secondary to hypothyroidism and medications  Severe hypothyroidism, now on new supplementation needs follow-up TSH in 4 weeks  Orthostatic hypotension  History of hypertension, medication currently on hold due to hypotension now improved  Chronic kidney disease stage III  BPH  History of dementia  Acute metabolic encephalopathy, secondary to severe hypothyroidism  Suspected nonsustained VT, resolved  Pulmonary nodule, needs follow-up  overweight status, bmi 25.7        CONSULTATIONS:  IP CONSULT TO CARDIOLOGY    Excerpted HPI from H&P of Mckenna Marie MD:  CHIEF COMPLAINT: patient unsure     HISTORY OF PRESENT ILLNESS:     nAneliese Pelaez is a 80 y.o.  male who presents via EMS. Patient is hard of hearing and has dementia but still lives alone. Patient's history is very limited and his non-POA daughter did answer the phone and provide some additional history. Patient lives alone and his son from [place] comes up every weekend to check on him and get his meds straight for the week. Patient's daughter has offered for him to live with her in the past but he declined. Patient is uncertain why he is here. From what I could gather from daughter it sounds like someone called EMS for an adult wellness check and EMS brought him to the ED. Patient is able to communicate well enough to tell me that when he stands that he gets SOB and also has trouble walking straight. Daughter also voices her concern over his SOB. Patient denies any pain.  Patient tries to tell me additional information but makes loose associations and admits that he gets frustrated when he can not understand me.       We were asked to admit for work up and evaluation of the above problems.            ______________________________________________________________________  DISCHARGE SUMMARY/HOSPITAL COURSE:  for full details see H&P, daily progress notes, labs, consult notes. Chronic URENA  Unclear cause improved   ? Relation to Hiatal Hernia, ? Relation to Moderate Mitral Regurgitation  Symptoms improved and he is now room air and denies any sob  CXR Clear   Recent ECHO shows normal EF with and  moderate regurgitation. CXR shows clear lung  BNP level 518, no acute CHF sxs  Hold metoprolol due to bradycardia. Dc lasix with orthostatic hypotension. watch bp and creat. May need to resume lasix at some point. No further work up recommended by Dr Zayda Troy  -cont  PPI  Will need out pt f/u with surgery due to large hiatal hernia     Hypothyroidism, very high TSH 91  -has not been on supp that we can see and he does not know his meds, but TSH was 76 3/18 and 84 in 12/18  -started levothyroxine 25 mcg, increased to 50 today and will need repeat TFT in 4 weeks     Unsteady Gait likely due to orthostasis and sinus bradycardia   -stop seroquel as antipsychotics can cause gait disturbance  -recent B12 checked and is wnl  -orthostatics were positive so will hold metoprolol and Norvasc and reassess blood pressure. Still some orthostasis, so lasix stopped as well.   -PT recs for 24hr care, SNF, needs LTC  -MRI from December 2018 reviewed, no indication to repeat at this time  -duplex Carotid shows less than 50% stenosis        CKD stage III:  BPH:  -renal function appears to be at baseline, continue to monitor  -continue flomax        Dementia:  Suspect acute  On chronic Met enceph from severe hypothyroidism, untreated, long stanging  -continue Low Namenda  -stopped Aricept due to bradycardia, but likely can be resumed as hypothyroidim treated and bradycardia improves  -treat hypothyroidism  -consider neuro eval as outpt     ? NSVT vs artifact    Most likely artifact  ekg nsr with base line RBBB  Electrolytes ok   Seen by cards, treat hypothyroidism, likely also contributing to bradycadia     4.5 mm lung nodule  Will need out pt f/u     Code Status: Full  Surrogate Decision Maker: Oseas Gillis        Recommended Disposition:     Patient will be discharged to [facility] today and ultimately may need transition to long-term care or 24-hour care at home. Patient will need continued monitoring of his renal function and blood pressure. His Lasix and beta-blockers are currently on hold due to hypotension and bradycardia which has improved but they may need to be resumed with close follow-up. He has been started on thyroid supplementation and will need to follow-up TSH in 4 weeks to assure that his dosing is appropriate and hopefully this will improve his overall mentation and memory issues. Patient will need referral as an outpatient to neurology with regards to his progressive dementia. Patient also need follow-up potentially with surgery for further assessment of his large hiatal hernia if family is considering aggressive interventions and symptoms severe. Patient will be continued on a PPI for now. Patient will need to follow-up with his cardiologist as an outpatient and has a pulmonary nodule that needs outpatient surveillance.        _______________________________________________________________________  Patient seen and examined by me on discharge day. Pertinent Findings:  Patient is hard of hearing and has no complaints. He says he feels fine. Denies shortness of breath chest pain dizziness.     He is awake and alert oriented to self, hospital little trouble with the month and the year but ultimately got it right mucous membranes moist cardiovascular regular rate lungs are clear abdomen benign extremities no clubbing cyanosis or edema      _______________________________________________________________________  DISCHARGE MEDICATIONS:   Current Discharge Medication List      START taking these medications    Details   levothyroxine (SYNTHROID) 50 mcg tablet Take 1 Tab by mouth every morning for 30 days. Qty: 30 Tab, Refills: 0      pantoprazole (PROTONIX) 40 mg tablet Take 1 Tab by mouth Daily (before breakfast) for 30 days. Qty: 30 Tab, Refills: 0         CONTINUE these medications which have NOT CHANGED    Details   tamsulosin (FLOMAX) 0.4 mg capsule TAKE 1 CAPSULE BY MOUTH EVERY DAY  Qty: 90 Cap, Refills: 0    Associated Diagnoses: BPH without urinary obstruction      allopurinol (ZYLOPRIM) 100 mg tablet Take 1 Tab by mouth daily. Qty: 90 Tab, Refills: 0    Associated Diagnoses: Gouty arthritis      memantine (NAMENDA) 10 mg tablet Take 1 Tab by mouth two (2) times a day. 10mg Twice daily  Qty: 180 Tab, Refills: 1    Associated Diagnoses: Vascular dementia without behavioral disturbance         STOP taking these medications       amLODIPine (NORVASC) 5 mg tablet Comments:   Reason for Stopping:         donepezil (ARICEPT) 5 mg tablet Comments:   Reason for Stopping:         metoprolol succinate (TOPROL-XL) 50 mg XL tablet Comments:   Reason for Stopping:         QUEtiapine (SEROQUEL) 25 mg tablet Comments:   Reason for Stopping:         furosemide (LASIX) 20 mg tablet Comments:   Reason for Stopping:               My Recommended Diet, Activity, Wound Care, and follow-up labs are listed in the patient's Discharge Insturctions which I have personally completed and reviewed.     ______________________________________________________________________    Risk of deterioration: Moderate    Condition at Discharge:  Stable  ______________________________________________________________________    Disposition  SNF/LTC  ______________________________________________________________________    Care Plan discussed with:   Patient, Family, RN, Care Manager    ______________________________________________________________________    Code Status: Full Code  ______________________________________________________________________      Follow up with:   PCP : Zahida Lamb MD  Follow-up Information     Follow up With Specialties Details Why Contact Info    [address]      Zahida Lamb MD Internal Medicine   [address] [facility] [address]  [address] [facility]      Shaun Lucero MD Cardiology   [address] [facility]   [address] [id number] [phone]                Total time in minutes spent coordinating this discharge (includes going over instructions, follow-up, prescriptions, and preparing report for sign off to her PCP) :  35 minutes    Signed:  Angely Kelly MD

## 2019-02-02 NOTE — ROUTINE PROCESS
[facility] here to  patient. IV taken out of patient and telebox taken off. All paperwork handed over to [facility]. All questions answered. Pt given personal belongings bag to take with him

## 2019-02-02 NOTE — ROUTINE PROCESS
Bedside and Verbal shift change report given to [facility]  RN (oncoming nurse) by Eduardo Marroquin nurseElba. Report included the following information SBAR, Kardex, Intake/Output and MAR. 
  
Zone Phone:   [phone]  
  
  
Significant changes during shift:  None Patient Information 
  
Nicolle Minor 
80 y.o. 
1/28/2019  8:15 PM by Giovanna Oliveira MD. Alexis Gutierrez admitted from Home 
  
Problem List 
  
       
Patient Active Problem List  
  Diagnosis Date Noted  Dementia 01/29/2019  Change in mental status 11/30/2018  Inability to walk 11/22/2017  Coronary artery disease involving native coronary artery without angina pectoris 05/13/2016  S/P CABG (coronary artery bypass graft) 05/13/2016  
 Hiatal hernia with gastroesophageal reflux 05/13/2016  H/O endovascular stent graft for abdominal aortic aneurysm 05/13/2016  CKD (chronic kidney disease) stage 3, GFR 30-59 ml/min ([facility]) 05/13/2016  AAA (abdominal aortic aneurysm) ([facility] [address] 11/07/2014  
  
       
Past Medical History:  
Diagnosis Date  Abdominal aortic aneurysm ([facility])    
 Arthritis    
 CAD (coronary artery disease)    
  bypass  Hypertension    
 Other ill-defined conditions(799.89)    
  gout  Other ill-defined conditions(799.89)    
  high cholesterol  
  
  
  
Core Measures: 
  
CVA: No No 
CHF:No No 
PNA:No No 
  
  
Activity Status: 
  
OOB to [employer] Ambulated this shift yes  
Bed Rest No 
  
  
DVT prophylaxis: 
  
DVT prophylaxis Med- yes DVT prophylaxis SCD or ALMAS- No  
  
Wounds: (If Applicable) 
  
Wounds- No 
  
Location  
  
Patient Safety: 
  
Falls Score Total Score: 4 Safety Level_______ Bed Alarm On? Yes Sitter? No 
  
Plan for upcoming shift: safety, monitor intake and output, encourage po fluids, needs consult to OT for tomorrow 
  
  
  
Discharge Plan: No TBD 
  
Active Consults: 
IP CONSULT TO CARDIOLOGY

## 2019-02-04 ENCOUNTER — PATIENT OUTREACH (OUTPATIENT)
Dept: FAMILY MEDICINE CLINIC | Age: 84
End: 2019-02-04

## 2019-02-04 RX ORDER — OMEPRAZOLE 20 MG/1
20 CAPSULE, DELAYED RELEASE ORAL DAILY
COMMUNITY
End: 2019-03-11 | Stop reason: SDUPTHER

## 2019-02-07 ENCOUNTER — PATIENT OUTREACH (OUTPATIENT)
Dept: CASE MANAGEMENT | Age: 84
End: 2019-02-07

## 2019-02-07 NOTE — PROGRESS NOTES
Community Care Team documentation for patient in [facility] Initial Follow Up Patient was discharged to [facility]. Information included in this progress note has been provided to SNF. Hospital Admission and Diagnosis: [facility] 1/28-2/2/19  Chronic dyspnea on exertion, unclear etiology  RRAT Score: 22    Advance Care Planning: Not on file PCP : Pushpa Palomino MD 
Nurse Navigator in PCP office: Papi Abad Note routed to Nurse Navigator team. 
 
SNF Attending:  Ector Pittman MD 
 
Spoke with SNF team. Ensured patient arrived to SNF safely with admission packet in order. Hospital discharge follow up appointment info provided to SNF:  Cardiology Sidney Caballero MD; Pulmonary for lung nodule. PT/OT ST providing skilled therapy in SNF. ST for cognition. Full Code in SNF. Ambulating 300-600 ft with walker and stand by assist.  Supervision with ADLs. Weekly labs scheduled. Your path meeting held on 2/6. Anticipate 1-2 weeks LOS. Family did not realize cognitive factor. Plan for discharge for LTC vs [facility]. Has used [facility] in the past.  PCP follow up 2/26 at 11:30 AM. Community Care Team will follow up weekly with [facility] until discharge. Medications were not reconciled and general patient assessment was not completed during this [facility] outreach. Mike Shin, MSN, RN, ACNS-BC, [facility] Nurse Navigator, [address] [place] [address] [phone]

## 2019-02-14 ENCOUNTER — PATIENT OUTREACH (OUTPATIENT)
Dept: CASE MANAGEMENT | Age: 84
End: 2019-02-14

## 2019-02-14 NOTE — PROGRESS NOTES
Community Care Team Documentation for Patient in [facility] Subsequent Follow up Patient remains at [facility] ([facility]). See previous [facility] Team notes. PCP : Peggyann Hashimoto, MD 
Nurse Navigator in PCP office: Omar Almeida Spoke with SNF team.  PT/OT continue. Currently supervision with transfers and all ADLs. Ambulating 200 ft with no device. Anticipate 1-2 more weeeks LOS. Plan for daughter to move in with patient. [facility] PCP follow up 2/26 at 11:30 AM. Medications were not reconciled and general patient assessment was not completed during this skilled nursing facility outreach. Rebeca Shin, MSN, RN, ACNS-BC, [facility] Nurse Navigator, [address] [place] [address] [phone]

## 2019-02-21 ENCOUNTER — PATIENT OUTREACH (OUTPATIENT)
Dept: CASE MANAGEMENT | Age: 84
End: 2019-02-21

## 2019-02-21 ENCOUNTER — PATIENT OUTREACH (OUTPATIENT)
Dept: FAMILY MEDICINE CLINIC | Age: 84
End: 2019-02-21

## 2019-02-21 NOTE — PROGRESS NOTES
Incoming email from Lady Evans with [facility] stating Cherelle Woodall will DC on 2/26/19 home with chas and Kaycee LEÓN. Writer will follow up upon discharge.

## 2019-02-21 NOTE — PROGRESS NOTES
Community Care Team Documentation for Patient in [facility] Subsequent Follow up Patient remains at [facility] ([facility]). See previous [facility] Team notes. PCP : Genaro Apley, MD 
Nurse Navigator in PCP office: Merlyn South Spoke with SNF team.  PT/OT continue. Currently ambulating 200 plus ft with no device with supervision. Supervision with transfers. Supervision to Modified independent with ADLs. Plan for discharge 2/26 to home with daughter with [facility]. PCP follow up 2/26 at 11:30 AM. Medications were not reconciled and general patient assessment was not completed during this skilled nursing facility outreach. Nathalie Shin, MSN, RN, ACNS-BC, [facility] Nurse Navigator, [address] [place] [address] [phone]

## 2019-02-26 ENCOUNTER — PATIENT OUTREACH (OUTPATIENT)
Dept: FAMILY MEDICINE CLINIC | Age: 84
End: 2019-02-26

## 2019-02-26 NOTE — PROGRESS NOTES
Incoming email received from Linda from [facility] stating Marsha Bravo will discharge tomorrow home with daughter, refusing HH. Will follow with patient tomorrow, 2/27/29.

## 2019-02-28 ENCOUNTER — PATIENT OUTREACH (OUTPATIENT)
Dept: CASE MANAGEMENT | Age: 84
End: 2019-02-28

## 2019-02-28 NOTE — PROGRESS NOTES
Community Care Team Documentation for Patient in [facility] Discharge Note Patient has been discharged from [facility] ([facility]). See previous [facility] Team notes. PCP : Raeann Pedro MD 
Nurse Navigator in PCP office: Alejandro Randle Note routed to Nurse Navigator team. 
 
Spoke with SNF team.        Confirmed patient discharged 2/27 to home with daughter. Refused HH and home visit. PCP follow up not rescheduled, as CCT not aware of changed discharge date. Community Care Team will sign off at this time. Medications were not reconciled and general patient assessment was not completed during this skilled nursing facility outreach. Skylar Shin, MSN, RN, ACNS-BC, [facility] Nurse Navigator, [address] [place] [address] [phone]

## 2019-02-28 NOTE — Clinical Note
DC from [facility] 2/27. Was to DC 2/26 and PCP on 2/26, but DC date changed,and we were not aware. Thanks.

## 2019-03-11 DIAGNOSIS — N40.0 BPH WITHOUT URINARY OBSTRUCTION: ICD-10-CM

## 2019-03-11 DIAGNOSIS — F01.50 VASCULAR DEMENTIA WITHOUT BEHAVIORAL DISTURBANCE (HCC): ICD-10-CM

## 2019-03-11 DIAGNOSIS — M10.9 GOUTY ARTHRITIS: ICD-10-CM

## 2019-03-13 RX ORDER — OMEPRAZOLE 20 MG/1
20 CAPSULE, DELAYED RELEASE ORAL DAILY
Qty: 90 CAP | Refills: 0 | Status: SHIPPED | OUTPATIENT
Start: 2019-03-13

## 2019-03-13 RX ORDER — ALLOPURINOL 100 MG/1
100 TABLET ORAL DAILY
Qty: 90 TAB | Refills: 0 | Status: SHIPPED | OUTPATIENT
Start: 2019-03-13

## 2019-03-13 RX ORDER — MEMANTINE HYDROCHLORIDE 10 MG/1
10 TABLET ORAL 2 TIMES DAILY
Qty: 180 TAB | Refills: 0 | Status: SHIPPED | OUTPATIENT
Start: 2019-03-13

## 2019-03-13 RX ORDER — TAMSULOSIN HYDROCHLORIDE 0.4 MG/1
CAPSULE ORAL
Qty: 90 CAP | Refills: 0 | Status: SHIPPED | OUTPATIENT
Start: 2019-03-13

## 2022-07-18 NOTE — PROGRESS NOTES
Problem: Mobility Impaired (Adult and Pediatric) Goal: *Acute Goals and Plan of Care (Insert Text) Physical Therapy Goals Initiated 1/29/2019 1. Patient will move from supine to sit and sit to supine , scoot up and down and roll side to side in bed with independence within 7 day(s). 2.  Patient will transfer from bed to chair and chair to bed with supervision/set-up using the least restrictive device within 7 day(s). 3.  Patient will perform sit to stand with supervision/set-up within 7 day(s). 4.  Patient will ambulate with supervision/set-up for 250 feet with the least restrictive device within 7 day(s). 5.  Patient will ascend/descend 13 stairs with 1 handrail(s) with supervision/set-up within 7 day(s). physical Therapy TREATMENT Patient: Tucker August (55 y.o. male) Date: 2/1/2019 Diagnosis: Dementia Dementia Bradycardia <principal problem not specified> Precautions:   
Chart, physical therapy assessment, plan of care and goals were reviewed. ASSESSMENT: 
Pt received supine in bed and agreeable to PT intervention. Pt cleared by nursing for mobility. Pt with good progress towards therapy goals this date, however continues to be limited by asymptomatic orthostatic hypotension. VS noted below for session. Bed mobility performed with supervision. Sitting balance intact. Sit<>stand transfers performed with close SBA. Static standing balance intact, however mildly impaired balance with gait training. He ambulated 30' plus additional 80' with CGA and no AD, however with mildly unsteady gait, mild path deviations, and mild scissoring throughout. No overt LOB noted. Pt with progressive decline in BP with increased activity/gait distance as noted below, however patient asymptomatic. Pt was assisted into bedside chair and left with all needs met, RN aware, family present, and chair alarm on following therapy session.  Recommend pt discharge home with HHPT and 24/7 supervision vs SNF · Patient reports taking 8-2 Suboxone BID  · [place] PDMP confirmed 16 mg daily rehab pending family's ability to provide assist. 
 
Supine: /73, HR 55 bpm 
Sitting: /73, HR 68 bpm 
Standing: /65, HR 77 bpm 
Standing post-gait: /59, HR 70 bpm 
Sitting post-activity: /72, HR 64 bpm 
 
Progression toward goals: 
[x]    Improving appropriately and progressing toward goals 
[]    Improving slowly and progressing toward goals 
[]    Not making progress toward goals and plan of care will be adjusted PLAN: 
Patient continues to benefit from skilled intervention to address the above impairments. Continue treatment per established plan of care. Discharge Recommendations:  Home Health and 24/7 supervision vs SNF rehab pending family's ability to provide assist 
Further Equipment Recommendations for Discharge:  None SUBJECTIVE:  
Patient stated Which way should we go?  OBJECTIVE DATA SUMMARY:  
Critical Behavior: 
Neurologic State: Alert Orientation Level: Oriented to person, Oriented to place, Oriented to situation, Oriented to time(slightly confused) Cognition: Follows commands Functional Mobility Training: 
Bed Mobility: 
  
Supine to Sit: Supervision Scooting: Supervision Transfers: 
Sit to Stand: Stand-by assistance Stand to Sit: Stand-by assistance Balance: 
Sitting: Intact Standing: Impaired; Without support Standing - Static: Good Standing - Dynamic : FairAmbulation/Gait Training: 
Distance (ft): 190 Feet (ft) Assistive Device: Gait belt Ambulation - Level of Assistance: Contact guard assistance;Assist x1 Gait Abnormalities: Decreased step clearance; Path deviations;Scissoring Base of Support: Narrowed Speed/Darlene: Fluctuations Step Length: Left shortened;Right shortened Pain: 
Pain Scale 1: Numeric (0 - 10) Pain Intensity 1: 0 Activity Tolerance:  
Good/fair - decreased BP with increased activity; asymptomatic; no complaints from patient; no pain complaints Please refer to the flowsheet for vital signs taken during this treatment. After treatment:  
[x]    Patient left in no apparent distress sitting up in chair 
[]    Patient left in no apparent distress in bed 
[x]    Call bell left within reach [x]    Nursing notified 
[x]    Caregiver present [x]    Bed alarm activated COMMUNICATION/COLLABORATION:  
The patients plan of care was discussed with: Physical Therapist and Registered Nurse Erica Dickens, PT, DPT Time Calculation: 16 mins

## 2022-10-25 NOTE — PROGRESS NOTES
Hospitalist Progress Note    NAME: Manuel Jefferson. 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 ..s/p arthrocentesis. 120 cc removed. cx neg.  -received x 1 solumedrol and depomedrol injection by orthopedics in ED. No role for prednisone taper  -not safe to use indocin given renal failure  Left knee pain. .due to above; improved. CKD-3b. ..follow renal function. Hyponatremia. ..stable. HTN. ..poorly controlled initially; not malignant htn; improved with addition of norvasc. CAD  Hypercholesterolemia  Confusion. ..unknown cause. Urine okay. No sign of any infection. Baseline mental status unknown. PT/OT  Ortho consult appreciated. Colchicine stopped due to renal disease. Cont antihypertensive meds  Add norvasc  Will need follow up w/ pcp and ortho. Will need to determine further med treatment for his gout  Case management to be involved in dc planning. Patient may need SNF at this time due to his confusion and risk for falls. Will get CT head today. Check B12 and folate levels. Neuro consult for further recommendations. Son updated 17.    79 yo wm w/ pmhx cad, htn, gout, ckd, and elevated cholesterol who presented to ED for left knee pain for several days. No trauma, falls, fevers, redness. He was unable to bear weight with left leg. Left venous doppler neg. Left knee arthrocentesis done; 120 cc straw colored fluid removed. Ortho consulted. Admitted for observation. Patient changed to inpatient status. Patient had received ivfluids on admission. He had mild hyponatremia which was better. IV fluids stopped. His serum cr bumped up. Subsequently given another liter of ivfluids. Ortho consulted. No further intervention recommended. Colchicine stopped due to renal disease. PT/OT consulted. He developed confusion. Etiology unknown.   Urinalysis neg for uti.      Body mass index is 24.41 kg/(m^2). Code status: Full  Prophylaxis: Hep SQ  Recommended Disposition: SNF     Subjective:     Chief Complaint / Reason for Physician Visit  \"I feel okay\". No cp/sob. Denies any pain in leg. D/w nurse. Patient has been alert but remains confused and disoriented. No problems overnight. Review of Systems:  Symptom Y/N Comments  Symptom Y/N Comments   Fever/Chills N   Chest Pain N    Poor Appetite N   Edema N    Cough N   Abdominal Pain N    Sputum N   Joint Pain N    SOB/URENA N   Pruritis/Rash N    Nausea/vomit N   Tolerating PT/OT Y    Diarrhea N   Tolerating Diet Y    Constipation N   Other       Could NOT obtain due to:      Objective:     VITALS:   Last 24hrs VS reviewed since prior progress note. Most recent are:  Patient Vitals for the past 24 hrs:   Temp Pulse Resp BP SpO2   11/25/17 0845 97.4 °F (36.3 °C) (!) 56 18 120/70 97 %   11/25/17 0258 97.3 °F (36.3 °C) (!) 59 16 110/62 95 %   11/24/17 2234 97.6 °F (36.4 °C) 62 16 117/52 98 %   11/24/17 2104 97.3 °F (36.3 °C) (!) 53 16 111/56 99 %   11/24/17 1649 97.6 °F (36.4 °C) (!) 57 16 111/63 98 %   11/24/17 1112 98 °F (36.7 °C) 61 16 115/61 100 %       Intake/Output Summary (Last 24 hours) at 11/25/17 1045  Last data filed at 11/25/17 0813   Gross per 24 hour   Intake              440 ml   Output                0 ml   Net              440 ml        PHYSICAL EXAM:  General: WD, WN. Confused, no acute distress    EENT:  EOMI. Anicteric sclerae. MMM  Resp:  CTA bilaterally, no wheezing or rales. No accessory muscle use  CV:  Regular  Rhythm, decreased  Left leg swelling; No swelling right leg; no tendernes left knee. GI:  Soft, Non distended, Non tender.  +Bowel sounds  Neurologic:  Alert but confused; does not know place. speech clear; moves all 4 limbs. Psych:   Not anxious nor agitated  Skin:  No rashes.   No jaundice; multiple tattoos    Reviewed most current lab test results and cultures  YES  Reviewed most current radiology test results   YES  Review and summation of old records today    NO  Reviewed patient's current orders and MAR    YES  PMH/SH reviewed - no change compared to H&P  ________________________________________________________________________  Care Plan discussed with:    Comments   Patient     Family      RN x    Care Manager     Consultant                        Multidiciplinary team rounds were held today with , nursing, pharmacist and clinical coordinator. Patient's plan of care was discussed; medications were reviewed and discharge planning was addressed. ________________________________________________________________________  Total NON critical care TIME:  20  Minutes    Total CRITICAL CARE TIME Spent:   Minutes non procedure based      Comments   >50% of visit spent in counseling and coordination of care     ________________________________________________________________________  Mayelin Orta MD     Procedures: see electronic medical records for all procedures/Xrays and details which were not copied into this note but were reviewed prior to creation of Plan. LABS:  I reviewed today's most current labs and imaging studies. Pertinent labs include:  Recent Labs      11/24/17   0243   WBC  10.1   HGB  11.4*   HCT  32.3*   PLT  335     Recent Labs      11/25/17   0510  11/24/17   0243   NA  138  134*   K  3.9  3.7   CL  107  103   CO2  23  20*   GLU  80  93   BUN  46*  48*   CREA  1.85*  2.19*   CA  8.6  8.6       Signed: Mayelin Orta MD        Met with son([place] - [phone]). Updated tests. B12 level not low. CT head neg. Will consult neurology. Son concerned about dementia. He remembered talking to his father on phone about a month ago in which it appeared tht father was confused. He was saying things that did not make any sense. Son will meet with  about arrange nursing home. Olanzapine Counseling- I discussed with the patient the common side effects of olanzapine including but are not limited to: lack of energy, dry mouth, increased appetite, sleepiness, tremor, constipation, dizziness, changes in behavior, or restlessness.  Explained that teenagers are more likely to experience headaches, abdominal pain, pain in the arms or legs, tiredness, and sleepiness.  Serious side effects include but are not limited: increased risk of death in elderly patients who are confused, have memory loss, or dementia-related psychosis; hyperglycemia; increased cholesterol and triglycerides; and weight gain.